# Patient Record
Sex: FEMALE | Race: WHITE | NOT HISPANIC OR LATINO
[De-identification: names, ages, dates, MRNs, and addresses within clinical notes are randomized per-mention and may not be internally consistent; named-entity substitution may affect disease eponyms.]

---

## 2016-12-03 RX ORDER — INSULIN GLARGINE 100 [IU]/ML
30 INJECTION, SOLUTION SUBCUTANEOUS
Qty: 0 | Refills: 0 | DISCHARGE
Start: 2016-12-03 | End: 2017-01-02

## 2017-03-24 ENCOUNTER — APPOINTMENT (OUTPATIENT)
Dept: VASCULAR SURGERY | Facility: CLINIC | Age: 67
End: 2017-03-24

## 2017-03-27 ENCOUNTER — APPOINTMENT (OUTPATIENT)
Dept: VASCULAR SURGERY | Facility: CLINIC | Age: 67
End: 2017-03-27

## 2017-04-03 ENCOUNTER — APPOINTMENT (OUTPATIENT)
Dept: VASCULAR SURGERY | Facility: CLINIC | Age: 67
End: 2017-04-03

## 2017-04-10 ENCOUNTER — APPOINTMENT (OUTPATIENT)
Dept: VASCULAR SURGERY | Facility: CLINIC | Age: 67
End: 2017-04-10

## 2017-05-04 ENCOUNTER — FORM ENCOUNTER (OUTPATIENT)
Age: 67
End: 2017-05-04

## 2017-05-05 ENCOUNTER — APPOINTMENT (OUTPATIENT)
Dept: VASCULAR SURGERY | Facility: CLINIC | Age: 67
End: 2017-05-05

## 2017-05-05 ENCOUNTER — OUTPATIENT (OUTPATIENT)
Dept: OUTPATIENT SERVICES | Facility: HOSPITAL | Age: 67
LOS: 1 days | End: 2017-05-05
Payer: COMMERCIAL

## 2017-05-05 PROCEDURE — 76937 US GUIDE VASCULAR ACCESS: CPT

## 2017-05-05 PROCEDURE — 36569 INSJ PICC 5 YR+ W/O IMAGING: CPT

## 2017-05-05 PROCEDURE — 77001 FLUOROGUIDE FOR VEIN DEVICE: CPT

## 2017-05-05 PROCEDURE — C1751: CPT

## 2017-05-05 PROCEDURE — 76937 US GUIDE VASCULAR ACCESS: CPT | Mod: 26

## 2017-05-05 PROCEDURE — 77001 FLUOROGUIDE FOR VEIN DEVICE: CPT | Mod: 26

## 2017-06-02 ENCOUNTER — APPOINTMENT (OUTPATIENT)
Dept: VASCULAR SURGERY | Facility: CLINIC | Age: 67
End: 2017-06-02

## 2017-06-02 VITALS — HEART RATE: 61 BPM | SYSTOLIC BLOOD PRESSURE: 192 MMHG | DIASTOLIC BLOOD PRESSURE: 89 MMHG | OXYGEN SATURATION: 97 %

## 2017-06-09 ENCOUNTER — EMERGENCY (EMERGENCY)
Facility: HOSPITAL | Age: 67
LOS: 1 days | Discharge: HOME | End: 2017-06-09
Attending: EMERGENCY MEDICINE
Payer: COMMERCIAL

## 2017-06-09 VITALS
WEIGHT: 132.94 LBS | DIASTOLIC BLOOD PRESSURE: 66 MMHG | SYSTOLIC BLOOD PRESSURE: 187 MMHG | OXYGEN SATURATION: 100 % | TEMPERATURE: 99 F | HEART RATE: 58 BPM | RESPIRATION RATE: 16 BRPM

## 2017-06-09 DIAGNOSIS — Z45.2 ENCOUNTER FOR ADJUSTMENT AND MANAGEMENT OF VASCULAR ACCESS DEVICE: ICD-10-CM

## 2017-06-09 PROCEDURE — 99282 EMERGENCY DEPT VISIT SF MDM: CPT

## 2017-06-09 NOTE — ED PROVIDER NOTE - NS ED MD SCRIBE ATTENDING SCRIBE SECTIONS
VITAL SIGNS( Pullset)/REVIEW OF SYSTEMS/PAST MEDICAL/SURGICAL/SOCIAL HISTORY/PHYSICAL EXAM/HISTORY OF PRESENT ILLNESS HISTORY OF PRESENT ILLNESS/PAST MEDICAL/SURGICAL/SOCIAL HISTORY/DISPOSITION/VITAL SIGNS( Pullset)/REVIEW OF SYSTEMS/PHYSICAL EXAM

## 2017-06-09 NOTE — ED PROVIDER NOTE - PMH
DM (diabetes mellitus)    HLD (hyperlipidemia)    HTN (hypertension)    IDDM (insulin dependent diabetes mellitus)

## 2017-06-09 NOTE — ED PROVIDER NOTE - OBJECTIVE STATEMENT
67 y/o F pt w/ PMHx of IDDM, HLD, and HTN was sent in by nurse for PICC line check L arm today. Pt states that she has been infusing Cipro twice per day via PICC line for a bone infection; pt noticed that her blue cap was missing from her PICC line tonight. Pt was told by a nurse to present to the ED. Pt denies fever, chills, or any other complaints. Pt is allergic to Penicillin (Rash).

## 2017-06-23 ENCOUNTER — FORM ENCOUNTER (OUTPATIENT)
Age: 67
End: 2017-06-23

## 2017-06-24 ENCOUNTER — OUTPATIENT (OUTPATIENT)
Dept: OUTPATIENT SERVICES | Facility: HOSPITAL | Age: 67
LOS: 1 days | End: 2017-06-24
Payer: COMMERCIAL

## 2017-06-24 PROCEDURE — 73718 MRI LOWER EXTREMITY W/O DYE: CPT | Mod: 26,LT

## 2017-06-24 PROCEDURE — 73718 MRI LOWER EXTREMITY W/O DYE: CPT

## 2017-06-26 ENCOUNTER — APPOINTMENT (OUTPATIENT)
Dept: VASCULAR SURGERY | Facility: CLINIC | Age: 67
End: 2017-06-26

## 2017-06-27 ENCOUNTER — APPOINTMENT (OUTPATIENT)
Dept: INFECTIOUS DISEASE | Facility: CLINIC | Age: 67
End: 2017-06-27

## 2017-06-27 VITALS
HEART RATE: 57 BPM | HEIGHT: 61 IN | TEMPERATURE: 97.9 F | RESPIRATION RATE: 14 BRPM | DIASTOLIC BLOOD PRESSURE: 84 MMHG | WEIGHT: 131 LBS | OXYGEN SATURATION: 98 % | SYSTOLIC BLOOD PRESSURE: 156 MMHG | BODY MASS INDEX: 24.73 KG/M2

## 2017-06-27 DIAGNOSIS — Z83.3 FAMILY HISTORY OF DIABETES MELLITUS: ICD-10-CM

## 2017-06-27 RX ORDER — FUROSEMIDE 40 MG/1
40 TABLET ORAL
Refills: 0 | Status: ACTIVE | COMMUNITY

## 2017-06-27 RX ORDER — TERAZOSIN 5 MG/1
5 CAPSULE ORAL
Refills: 0 | Status: ACTIVE | COMMUNITY

## 2017-06-27 RX ORDER — ATORVASTATIN CALCIUM 20 MG/1
20 TABLET, FILM COATED ORAL
Refills: 0 | Status: ACTIVE | COMMUNITY

## 2017-06-28 LAB
ALBUMIN SERPL ELPH-MCNC: 4.4 G/DL
ALP BLD-CCNC: 64 U/L
ALT SERPL-CCNC: 29 U/L
ANION GAP SERPL CALC-SCNC: 14 MMOL/L
AST SERPL-CCNC: 38 U/L
BASOPHILS # BLD AUTO: 0.07 K/UL
BASOPHILS NFR BLD AUTO: 0.8 %
BILIRUB SERPL-MCNC: 0.5 MG/DL
BUN SERPL-MCNC: 41 MG/DL
CALCIUM SERPL-MCNC: 10.5 MG/DL
CHLORIDE SERPL-SCNC: 99 MMOL/L
CO2 SERPL-SCNC: 26 MMOL/L
CREAT SERPL-MCNC: 1.82 MG/DL
CRP SERPL-MCNC: <0.2 MG/DL
EOSINOPHIL # BLD AUTO: 0.79 K/UL
EOSINOPHIL NFR BLD AUTO: 9.2 %
ERYTHROCYTE [SEDIMENTATION RATE] IN BLOOD BY WESTERGREN METHOD: 35 MM/HR
GLUCOSE SERPL-MCNC: 220 MG/DL
HCT VFR BLD CALC: 34.3 %
HGB BLD-MCNC: 11.3 G/DL
IMM GRANULOCYTES NFR BLD AUTO: 0.1 %
LYMPHOCYTES # BLD AUTO: 2.56 K/UL
LYMPHOCYTES NFR BLD AUTO: 29.8 %
MAN DIFF?: NORMAL
MCHC RBC-ENTMCNC: 31 PG
MCHC RBC-ENTMCNC: 32.9 GM/DL
MCV RBC AUTO: 94 FL
MONOCYTES # BLD AUTO: 0.59 K/UL
MONOCYTES NFR BLD AUTO: 6.9 %
NEUTROPHILS # BLD AUTO: 4.56 K/UL
NEUTROPHILS NFR BLD AUTO: 53.2 %
PLATELET # BLD AUTO: 252 K/UL
POTASSIUM SERPL-SCNC: 4.6 MMOL/L
PROT SERPL-MCNC: 8.8 G/DL
RBC # BLD: 3.65 M/UL
RBC # FLD: 13.8 %
SODIUM SERPL-SCNC: 139 MMOL/L
WBC # FLD AUTO: 8.58 K/UL

## 2017-07-14 ENCOUNTER — APPOINTMENT (OUTPATIENT)
Dept: INFECTIOUS DISEASE | Facility: CLINIC | Age: 67
End: 2017-07-14

## 2017-07-14 VITALS
BODY MASS INDEX: 24.55 KG/M2 | HEIGHT: 61 IN | DIASTOLIC BLOOD PRESSURE: 80 MMHG | WEIGHT: 130 LBS | HEART RATE: 59 BPM | OXYGEN SATURATION: 97 % | SYSTOLIC BLOOD PRESSURE: 142 MMHG | RESPIRATION RATE: 12 BRPM | TEMPERATURE: 98 F

## 2017-07-14 RX ORDER — BLOOD SUGAR DIAGNOSTIC
STRIP MISCELLANEOUS
Qty: 600 | Refills: 0 | Status: ACTIVE | COMMUNITY
Start: 2017-01-04

## 2017-07-14 RX ORDER — CLINDAMYCIN HYDROCHLORIDE 300 MG/1
300 CAPSULE ORAL
Qty: 42 | Refills: 0 | Status: COMPLETED | COMMUNITY
Start: 2017-04-11

## 2017-07-14 RX ORDER — METOPROLOL SUCCINATE 100 MG/1
100 TABLET, EXTENDED RELEASE ORAL
Qty: 90 | Refills: 0 | Status: ACTIVE | COMMUNITY
Start: 2016-12-30

## 2017-07-17 ENCOUNTER — APPOINTMENT (OUTPATIENT)
Dept: VASCULAR SURGERY | Facility: CLINIC | Age: 67
End: 2017-07-17

## 2017-07-17 LAB
ALBUMIN SERPL ELPH-MCNC: 4.6 G/DL
ALP BLD-CCNC: 79 U/L
ALT SERPL-CCNC: 31 U/L
ANION GAP SERPL CALC-SCNC: 19 MMOL/L
AST SERPL-CCNC: 31 U/L
BASOPHILS # BLD AUTO: 0.06 K/UL
BASOPHILS NFR BLD AUTO: 0.8 %
BILIRUB SERPL-MCNC: 0.4 MG/DL
BUN SERPL-MCNC: 50 MG/DL
CALCIUM SERPL-MCNC: 10.9 MG/DL
CHLORIDE SERPL-SCNC: 97 MMOL/L
CO2 SERPL-SCNC: 25 MMOL/L
CREAT SERPL-MCNC: 1.95 MG/DL
CRP SERPL-MCNC: <0.2 MG/DL
EOSINOPHIL # BLD AUTO: 0.5 K/UL
EOSINOPHIL NFR BLD AUTO: 6.3 %
ERYTHROCYTE [SEDIMENTATION RATE] IN BLOOD BY WESTERGREN METHOD: 41 MM/HR
GLUCOSE SERPL-MCNC: 148 MG/DL
HCT VFR BLD CALC: 35 %
HGB BLD-MCNC: 11.6 G/DL
IMM GRANULOCYTES NFR BLD AUTO: 0.1 %
LYMPHOCYTES # BLD AUTO: 2.89 K/UL
LYMPHOCYTES NFR BLD AUTO: 36.2 %
MAN DIFF?: NORMAL
MCHC RBC-ENTMCNC: 31.2 PG
MCHC RBC-ENTMCNC: 33.1 GM/DL
MCV RBC AUTO: 94.1 FL
MONOCYTES # BLD AUTO: 0.5 K/UL
MONOCYTES NFR BLD AUTO: 6.3 %
NEUTROPHILS # BLD AUTO: 4.02 K/UL
NEUTROPHILS NFR BLD AUTO: 50.3 %
PLATELET # BLD AUTO: 250 K/UL
POTASSIUM SERPL-SCNC: 5.1 MMOL/L
PROT SERPL-MCNC: 8.9 G/DL
RBC # BLD: 3.72 M/UL
RBC # FLD: 13.6 %
SODIUM SERPL-SCNC: 141 MMOL/L
WBC # FLD AUTO: 7.98 K/UL

## 2017-09-16 ENCOUNTER — OUTPATIENT (OUTPATIENT)
Dept: OUTPATIENT SERVICES | Facility: HOSPITAL | Age: 67
LOS: 1 days | End: 2017-09-16
Payer: COMMERCIAL

## 2017-09-16 PROCEDURE — 73718 MRI LOWER EXTREMITY W/O DYE: CPT | Mod: 26,LT

## 2017-09-16 PROCEDURE — 73718 MRI LOWER EXTREMITY W/O DYE: CPT

## 2017-09-18 ENCOUNTER — APPOINTMENT (OUTPATIENT)
Dept: VASCULAR SURGERY | Facility: CLINIC | Age: 67
End: 2017-09-18

## 2017-09-26 ENCOUNTER — APPOINTMENT (OUTPATIENT)
Dept: INFECTIOUS DISEASE | Facility: CLINIC | Age: 67
End: 2017-09-26
Payer: COMMERCIAL

## 2017-09-26 VITALS
SYSTOLIC BLOOD PRESSURE: 148 MMHG | HEIGHT: 61 IN | TEMPERATURE: 98.6 F | OXYGEN SATURATION: 97 % | DIASTOLIC BLOOD PRESSURE: 70 MMHG | HEART RATE: 60 BPM | RESPIRATION RATE: 14 BRPM | WEIGHT: 138 LBS | BODY MASS INDEX: 26.06 KG/M2

## 2017-09-26 DIAGNOSIS — L97.929 NON-PRESSURE CHRONIC ULCER OF UNSPECIFIED PART OF LEFT LOWER LEG WITH UNSPECIFIED SEVERITY: ICD-10-CM

## 2017-09-26 DIAGNOSIS — M86.9 OSTEOMYELITIS, UNSPECIFIED: ICD-10-CM

## 2017-09-26 PROCEDURE — 99214 OFFICE O/P EST MOD 30 MIN: CPT

## 2017-09-26 RX ORDER — INSULIN GLARGINE 100 [IU]/ML
INJECTION, SOLUTION SUBCUTANEOUS
Refills: 0 | Status: ACTIVE | COMMUNITY

## 2018-01-31 ENCOUNTER — INPATIENT (INPATIENT)
Facility: HOSPITAL | Age: 68
LOS: 4 days | Discharge: HOME | DRG: 638 | End: 2018-02-05
Attending: INTERNAL MEDICINE | Admitting: INTERNAL MEDICINE
Payer: COMMERCIAL

## 2018-01-31 VITALS
SYSTOLIC BLOOD PRESSURE: 186 MMHG | HEART RATE: 69 BPM | DIASTOLIC BLOOD PRESSURE: 74 MMHG | WEIGHT: 146.61 LBS | RESPIRATION RATE: 18 BRPM | OXYGEN SATURATION: 97 % | TEMPERATURE: 99 F

## 2018-01-31 DIAGNOSIS — E11.621 TYPE 2 DIABETES MELLITUS WITH FOOT ULCER: ICD-10-CM

## 2018-01-31 DIAGNOSIS — E87.3 ALKALOSIS: ICD-10-CM

## 2018-01-31 DIAGNOSIS — E11.9 TYPE 2 DIABETES MELLITUS WITHOUT COMPLICATIONS: ICD-10-CM

## 2018-01-31 DIAGNOSIS — R63.8 OTHER SYMPTOMS AND SIGNS CONCERNING FOOD AND FLUID INTAKE: ICD-10-CM

## 2018-01-31 DIAGNOSIS — N18.9 CHRONIC KIDNEY DISEASE, UNSPECIFIED: ICD-10-CM

## 2018-01-31 DIAGNOSIS — Z29.9 ENCOUNTER FOR PROPHYLACTIC MEASURES, UNSPECIFIED: ICD-10-CM

## 2018-01-31 DIAGNOSIS — E83.52 HYPERCALCEMIA: ICD-10-CM

## 2018-01-31 LAB
ALBUMIN SERPL ELPH-MCNC: 4.5 G/DL — SIGNIFICANT CHANGE UP (ref 3.3–5)
ALP SERPL-CCNC: 94 U/L — SIGNIFICANT CHANGE UP (ref 40–120)
ALT FLD-CCNC: 19 U/L — SIGNIFICANT CHANGE UP (ref 10–45)
ANION GAP SERPL CALC-SCNC: 11 MMOL/L — SIGNIFICANT CHANGE UP (ref 5–17)
ANISOCYTOSIS BLD QL: SLIGHT — SIGNIFICANT CHANGE UP
APTT BLD: 32 SEC — SIGNIFICANT CHANGE UP (ref 27.5–37.4)
AST SERPL-CCNC: 22 U/L — SIGNIFICANT CHANGE UP (ref 10–40)
BASOPHILS NFR BLD AUTO: 0.5 % — SIGNIFICANT CHANGE UP (ref 0–2)
BASOPHILS NFR BLD AUTO: 1 % — SIGNIFICANT CHANGE UP (ref 0–2)
BILIRUB SERPL-MCNC: 0.4 MG/DL — SIGNIFICANT CHANGE UP (ref 0.2–1.2)
BUN SERPL-MCNC: 35 MG/DL — HIGH (ref 7–23)
CALCIUM SERPL-MCNC: 11.4 MG/DL — HIGH (ref 8.4–10.5)
CHLORIDE SERPL-SCNC: 99 MMOL/L — SIGNIFICANT CHANGE UP (ref 96–108)
CO2 SERPL-SCNC: 32 MMOL/L — HIGH (ref 22–31)
CREAT SERPL-MCNC: 1.64 MG/DL — HIGH (ref 0.5–1.3)
CRP SERPL-MCNC: 3.35 MG/DL — HIGH (ref 0–0.4)
EOSINOPHIL NFR BLD AUTO: 2.9 % — SIGNIFICANT CHANGE UP (ref 0–6)
EOSINOPHIL NFR BLD AUTO: 3 % — SIGNIFICANT CHANGE UP (ref 0–6)
ERYTHROCYTE [SEDIMENTATION RATE] IN BLOOD: 66 MM/HR — HIGH
GLUCOSE BLDC GLUCOMTR-MCNC: 163 MG/DL — HIGH (ref 70–99)
GLUCOSE SERPL-MCNC: 220 MG/DL — HIGH (ref 70–99)
HCT VFR BLD CALC: 33.6 % — LOW (ref 34.5–45)
HGB BLD-MCNC: 11.4 G/DL — LOW (ref 11.5–15.5)
INR BLD: 1.04 — SIGNIFICANT CHANGE UP (ref 0.88–1.16)
LYMPHOCYTES # BLD AUTO: 24.4 % — SIGNIFICANT CHANGE UP (ref 13–44)
LYMPHOCYTES # BLD AUTO: 33 % — SIGNIFICANT CHANGE UP (ref 13–44)
MANUAL DIF COMMENT BLD-IMP: SIGNIFICANT CHANGE UP
MCHC RBC-ENTMCNC: 31.1 PG — SIGNIFICANT CHANGE UP (ref 27–34)
MCHC RBC-ENTMCNC: 33.9 G/DL — SIGNIFICANT CHANGE UP (ref 32–36)
MCV RBC AUTO: 91.8 FL — SIGNIFICANT CHANGE UP (ref 80–100)
MONOCYTES NFR BLD AUTO: 4 % — SIGNIFICANT CHANGE UP (ref 2–14)
MONOCYTES NFR BLD AUTO: 6.3 % — SIGNIFICANT CHANGE UP (ref 2–14)
NEUTROPHILS NFR BLD AUTO: 59 % — SIGNIFICANT CHANGE UP (ref 43–77)
NEUTROPHILS NFR BLD AUTO: 65.9 % — SIGNIFICANT CHANGE UP (ref 43–77)
PLAT MORPH BLD: (no result)
PLATELET # BLD AUTO: 278 K/UL — SIGNIFICANT CHANGE UP (ref 150–400)
POTASSIUM SERPL-MCNC: 4.8 MMOL/L — SIGNIFICANT CHANGE UP (ref 3.5–5.3)
POTASSIUM SERPL-SCNC: 4.8 MMOL/L — SIGNIFICANT CHANGE UP (ref 3.5–5.3)
PROT SERPL-MCNC: 9.1 G/DL — HIGH (ref 6–8.3)
PROTHROM AB SERPL-ACNC: 11.6 SEC — SIGNIFICANT CHANGE UP (ref 9.8–12.7)
RBC # BLD: 3.66 M/UL — LOW (ref 3.8–5.2)
RBC # FLD: 12.3 % — SIGNIFICANT CHANGE UP (ref 10.3–16.9)
RBC BLD AUTO: (no result)
SODIUM SERPL-SCNC: 142 MMOL/L — SIGNIFICANT CHANGE UP (ref 135–145)
WBC # BLD: 10.9 K/UL — HIGH (ref 3.8–10.5)
WBC # FLD AUTO: 10.9 K/UL — HIGH (ref 3.8–10.5)

## 2018-01-31 PROCEDURE — 73630 X-RAY EXAM OF FOOT: CPT | Mod: 26,RT

## 2018-01-31 PROCEDURE — 99285 EMERGENCY DEPT VISIT HI MDM: CPT

## 2018-01-31 RX ORDER — ATORVASTATIN CALCIUM 80 MG/1
20 TABLET, FILM COATED ORAL AT BEDTIME
Qty: 0 | Refills: 0 | Status: DISCONTINUED | OUTPATIENT
Start: 2018-01-31 | End: 2018-02-05

## 2018-01-31 RX ORDER — CEFEPIME 1 G/1
2000 INJECTION, POWDER, FOR SOLUTION INTRAMUSCULAR; INTRAVENOUS ONCE
Qty: 0 | Refills: 0 | Status: DISCONTINUED | OUTPATIENT
Start: 2018-01-31 | End: 2018-01-31

## 2018-01-31 RX ORDER — GLUCAGON INJECTION, SOLUTION 0.5 MG/.1ML
1 INJECTION, SOLUTION SUBCUTANEOUS ONCE
Qty: 0 | Refills: 0 | Status: DISCONTINUED | OUTPATIENT
Start: 2018-01-31 | End: 2018-02-05

## 2018-01-31 RX ORDER — CEFEPIME 1 G/1
2000 INJECTION, POWDER, FOR SOLUTION INTRAMUSCULAR; INTRAVENOUS ONCE
Qty: 0 | Refills: 0 | Status: COMPLETED | OUTPATIENT
Start: 2018-01-31 | End: 2018-01-31

## 2018-01-31 RX ORDER — INSULIN GLARGINE 100 [IU]/ML
25 INJECTION, SOLUTION SUBCUTANEOUS AT BEDTIME
Qty: 0 | Refills: 0 | Status: DISCONTINUED | OUTPATIENT
Start: 2018-01-31 | End: 2018-02-04

## 2018-01-31 RX ORDER — FUROSEMIDE 40 MG
40 TABLET ORAL DAILY
Qty: 0 | Refills: 0 | Status: DISCONTINUED | OUTPATIENT
Start: 2018-01-31 | End: 2018-01-31

## 2018-01-31 RX ORDER — INSULIN LISPRO 100/ML
VIAL (ML) SUBCUTANEOUS
Qty: 0 | Refills: 0 | Status: DISCONTINUED | OUTPATIENT
Start: 2018-01-31 | End: 2018-02-05

## 2018-01-31 RX ORDER — VANCOMYCIN HCL 1 G
1000 VIAL (EA) INTRAVENOUS ONCE
Qty: 0 | Refills: 0 | Status: COMPLETED | OUTPATIENT
Start: 2018-01-31 | End: 2018-01-31

## 2018-01-31 RX ORDER — SODIUM CHLORIDE 9 MG/ML
1000 INJECTION INTRAMUSCULAR; INTRAVENOUS; SUBCUTANEOUS ONCE
Qty: 0 | Refills: 0 | Status: COMPLETED | OUTPATIENT
Start: 2018-01-31 | End: 2018-01-31

## 2018-01-31 RX ORDER — SODIUM CHLORIDE 9 MG/ML
1000 INJECTION, SOLUTION INTRAVENOUS
Qty: 0 | Refills: 0 | Status: DISCONTINUED | OUTPATIENT
Start: 2018-01-31 | End: 2018-02-05

## 2018-01-31 RX ORDER — ASPIRIN/CALCIUM CARB/MAGNESIUM 324 MG
81 TABLET ORAL DAILY
Qty: 0 | Refills: 0 | Status: DISCONTINUED | OUTPATIENT
Start: 2018-01-31 | End: 2018-02-05

## 2018-01-31 RX ORDER — TAMSULOSIN HYDROCHLORIDE 0.4 MG/1
0.8 CAPSULE ORAL AT BEDTIME
Qty: 0 | Refills: 0 | Status: DISCONTINUED | OUTPATIENT
Start: 2018-01-31 | End: 2018-02-05

## 2018-01-31 RX ORDER — DEXTROSE 50 % IN WATER 50 %
25 SYRINGE (ML) INTRAVENOUS ONCE
Qty: 0 | Refills: 0 | Status: DISCONTINUED | OUTPATIENT
Start: 2018-01-31 | End: 2018-02-05

## 2018-01-31 RX ORDER — METOPROLOL TARTRATE 50 MG
100 TABLET ORAL DAILY
Qty: 0 | Refills: 0 | Status: DISCONTINUED | OUTPATIENT
Start: 2018-01-31 | End: 2018-02-02

## 2018-01-31 RX ORDER — DEXTROSE 50 % IN WATER 50 %
12.5 SYRINGE (ML) INTRAVENOUS ONCE
Qty: 0 | Refills: 0 | Status: DISCONTINUED | OUTPATIENT
Start: 2018-01-31 | End: 2018-02-05

## 2018-01-31 RX ORDER — INSULIN GLARGINE 100 [IU]/ML
30 INJECTION, SOLUTION SUBCUTANEOUS AT BEDTIME
Qty: 0 | Refills: 0 | Status: DISCONTINUED | OUTPATIENT
Start: 2018-01-31 | End: 2018-01-31

## 2018-01-31 RX ORDER — VANCOMYCIN HCL 1 G
1000 VIAL (EA) INTRAVENOUS EVERY 24 HOURS
Qty: 0 | Refills: 0 | Status: DISCONTINUED | OUTPATIENT
Start: 2018-02-01 | End: 2018-02-01

## 2018-01-31 RX ORDER — DEXTROSE 50 % IN WATER 50 %
1 SYRINGE (ML) INTRAVENOUS ONCE
Qty: 0 | Refills: 0 | Status: DISCONTINUED | OUTPATIENT
Start: 2018-01-31 | End: 2018-02-05

## 2018-01-31 RX ADMIN — TAMSULOSIN HYDROCHLORIDE 0.8 MILLIGRAM(S): 0.4 CAPSULE ORAL at 23:58

## 2018-01-31 RX ADMIN — SODIUM CHLORIDE 1000 MILLILITER(S): 9 INJECTION INTRAMUSCULAR; INTRAVENOUS; SUBCUTANEOUS at 19:09

## 2018-01-31 RX ADMIN — INSULIN GLARGINE 25 UNIT(S): 100 INJECTION, SOLUTION SUBCUTANEOUS at 23:08

## 2018-01-31 RX ADMIN — Medication 100 MILLIGRAM(S): at 23:58

## 2018-01-31 RX ADMIN — Medication 250 MILLIGRAM(S): at 14:32

## 2018-01-31 RX ADMIN — ATORVASTATIN CALCIUM 20 MILLIGRAM(S): 80 TABLET, FILM COATED ORAL at 23:08

## 2018-01-31 RX ADMIN — Medication 2: at 23:09

## 2018-01-31 RX ADMIN — CEFEPIME 2000 MILLIGRAM(S): 1 INJECTION, POWDER, FOR SOLUTION INTRAMUSCULAR; INTRAVENOUS at 18:22

## 2018-01-31 NOTE — H&P ADULT - PROBLEM SELECTOR PLAN 5
Diabetic diet. Low risk (Improve 1), no pharmacologic DVT, encourage ambulation. mild however persistantly high (10.9 outpatient in July, also has elevated protein outpatient, no work up per patient), with elevated protein gap.  Would do work up for MM.  Would follow up with vit D, PTH, PTHrP.

## 2018-01-31 NOTE — H&P ADULT - PROBLEM SELECTOR PLAN 3
Resume lantus 30 at bed time. Follow up A1C.  Resume lantus 25 at bed time (Home dose 30, however more restricted diet inpatient). Follow up A1C. Marzena Elevated bicarb, likely 2/2 to contraction alkylosis 2/2 to lasix.  Holding for now, would f/u with Dr. Mayorga in the AM for collaterol on lasix, patient has no hx of CHF or extremity swelling, nephrotic?

## 2018-01-31 NOTE — H&P ADULT - PMH
CKD (chronic kidney disease)    DM (diabetes mellitus)    HLD (hyperlipidemia)    HTN (hypertension)    IDDM (insulin dependent diabetes mellitus)

## 2018-01-31 NOTE — H&P ADULT - PROBLEM SELECTOR PLAN 4
Low risk (Improve 1), no pharmacologic DVT, encourage ambulation. Mild, could be dehydration, given 1L bolus NS.  Follow repeat level in the AM, if persistently high, would do work up (Vit D, PTH, PTHrP). mild however persistantly high (10.9 outpatient in July, also has elevated protein outpatient, no work up per patient), with elevated protein gap.  Would do work up for MM.  Would follow up with vit D, PTH, PTHrP. Resume lantus 25 at bed time (Home dose 30, however more restricted diet inpatient). Follow up A1C. Marzena

## 2018-01-31 NOTE — H&P ADULT - NSHPREVIEWOFSYSTEMS_GEN_ALL_CORE
( -  )fevers/chills  ( - ) dyspnea  (  - ) cough  (  - ) chest pain  (  - ) palpatations  ( - ) dizziness/lightheadedness  (  - ) nausea/vomiting  (  - ) abd pain  (  - ) diarrhea  (  - ) melena  (  - ) hematochezia  (  - ) dysuria  ( - ) hematuria  (  - ) leg swelling  ( -) calf tenderness  (  - ) motor weakness  (  - ) extremity numbness  ( - ) back pain  ( + ) tolerating POs  ( + ) BM  ROS: 12 point review of systems otherwise negative

## 2018-01-31 NOTE — ED CLERICAL - NS ED CLERK NOTE PRE-ARRIVAL INFORMATION; ADDITIONAL PRE-ARRIVAL INFORMATION
66 Y/O PIETRO JOSE IS BEING SENT BY DR CAPPS FOR DIABETIC RIGHT FOOT INFECTION TO BE ADMITED UNDER DR SHAYLA RAJPUT PODIATRY RESIDENT WHEN PT ARRIVES

## 2018-01-31 NOTE — H&P ADULT - PROBLEM SELECTOR PLAN 2
Unclear baseline, however patient is on dialysis in the past. Would follow up with Dr. Mayorga office in the AM for collaterol.  Patient was started on lasix, metoprolol and terazosin by dr. Mayorga, unclear why. Would continue for now. Outpatient lab 1.3-1.9, patient was on dialysis in the past. Would follow up with Dr. Mayorga office in the AM for collaterol.  Patient was started on lasix, metoprolol and terazosin by dr. Mayorga, unclear why. Would continue for now. Outpatient lab 1.3-1.9, patient was on dialysis in the past. Would follow up with Dr. Mayorga office in the AM for collaterol.  Patient was started on lasix, metoprolol and terazosin by dr. Mayorga, unclear why. Would continue for now.  Holding Lasix due to contraction metabolic acidosis.

## 2018-01-31 NOTE — H&P ADULT - NSHPLABSRESULTS_GEN_ALL_CORE
11.4   10.9  )-----------( 278      ( 31 Jan 2018 14:45 )             33.6   01-31    142  |  99  |  35<H>  ----------------------------<  220<H>  4.8   |  32<H>  |  1.64<H>    Ca    11.4<H>      31 Jan 2018 14:45    TPro  9.1<H>  /  Alb  4.5  /  TBili  0.4  /  DBili  x   /  AST  22  /  ALT  19  /  AlkPhos  94  01-31

## 2018-01-31 NOTE — ED PROVIDER NOTE - OBJECTIVE STATEMENT
66 yo F with pmh of DM, HLD, HTN, sent in by Dr. Huang for diabetic foot ulcer. Pt states she noticed a discoloration on the bottom of her R great toe 2 days ago. Denies trauma. Pt states she went to Dr. Huang today who sent her in to be admitted for IV abx. Denies fever, chills, pain, swelling, discharge.

## 2018-01-31 NOTE — H&P ADULT - NSHPPHYSICALEXAM_GEN_ALL_CORE
PHYSICAL EXAM:    Constitutional: NAD  Eyes: PERRLA, EOMI  ENMT: MMM, neck supple, no lymphadenopathy  Respiratory: CTAB, no r/r  Cardiovascular: RRR, audible S1S2, no murmur/rub/gallop  Gastrointestinal: Soft, NDNT  Extremities: no edema, no ulceration  Vascular: DPs and Radial pulse palpaple  Neurological: AnOx3, CN II-XII intact  Skin: 8erb4rs dark ulceration on bottom right toe, no visible exudate, mild surrounding erythema, non-tender to palpation.  Lymph Nodes: no lympadenopathy  Musculoskeletal: full ROM on all extremities

## 2018-01-31 NOTE — ED PROVIDER NOTE - PHYSICAL EXAMINATION
CONSTITUTIONAL: Well-appearing; well-nourished; in no apparent distress.   HEAD: Normocephalic; atraumatic.   EYES: PERRL; EOM intact; conjunctiva and sclera clear  ENT: normal nose; no rhinorrhea; normal pharynx with no erythema or lesions.   NECK: Supple; non-tender;   CARDIOVASCULAR: Normal S1, S2; no murmurs, rubs, or gallops. Regular rate and rhythm.   RESPIRATORY: Breathing easily; breath sounds clear and equal bilaterally; no wheezes, rhonchi, or rales.  MSK: FROM at all extremities, normal tone   EXT: No cyanosis or edema; N/V intact  SKIN: R great toe- 3x3cm shallow ulcer, dry to bottom of toe, DP pulse 2+, good cap refill

## 2018-01-31 NOTE — ED PROVIDER NOTE - MEDICAL DECISION MAKING DETAILS
68 yo F with pmh of DM, HLD, HTN, sent in by Dr. Huang for diabetic foot ulcer. R great toe- 3x3cm shallow ulcer, dry to bottom of toe, DP pulse 2+, good cap refill

## 2018-01-31 NOTE — H&P ADULT - HISTORY OF PRESENT ILLNESS
Patient is a 68 yo female pmh of DM2 on insulin, CKD, who presented with ulcer of right toe. Patient stated she noticed the ulcer 2 days ago, unclear the trigger, thought it was just ink marker, denied pain, no exudate, no open wound, never had ulcer in the past, denied fever/chill, no n/v. Been following up with Dr. Lee, who sent the patient here for IV antibiotics.  In the ED, patient given vancomycin, ESR and CRP elevated, Xray of foot showed no osteo. Evaluated by podiatry. Patient is a 66 yo female pmh of DM2 on insulin, CKD, who presented with ulcer of right toe. Patient stated she noticed the ulcer 2 days ago, unclear the trigger, thought it was just ink marker, denied pain, no exudate, no open wound, never had ulcer in the past, denied fever/chill, no n/v. Been following up with Dr. Lee, who sent the patient here for IV antibiotics.  Patient was treated   In the ED, patient given vancomycin, ESR and CRP elevated, Xray of foot showed no osteo. Evaluated by podiatry. Patient is a 66 yo female pmh of DM2 on insulin, CKD, who presented with ulcer of right toe. Patient stated she noticed the ulcer 2 days ago, unclear the trigger, thought it was just ink marker, denied pain, no exudate, no open wound, never had ulcer in the past, denied fever/chill, no n/v. Been following up with Dr. Lee, who sent the patient here for IV antibiotics.  Patient was treated for osteo of left foot with cipro and clinda IV outpatient July 2017, followed by Dr. Alvarado previously.  In the ED, patient given vancomycin, ESR and CRP elevated, Xray of foot showed no osteo. Evaluated by podiatry.

## 2018-01-31 NOTE — ED ADULT NURSE NOTE - CHPI ED SYMPTOMS NEG
no bleeding/no purulent drainage/no red streaks/no vomiting/no pain/no chills/no bleeding at site/no drainage/no fever

## 2018-01-31 NOTE — ED ADULT NURSE REASSESSMENT NOTE - NS ED NURSE REASSESS COMMENT FT1
Pt received from [facility], Pt is A&Ox3 with no s.s of acute distress at this time. Pt has right big toe wrapped in gauze at this time.  Pt awaiting bed and will continue to monitor.

## 2018-01-31 NOTE — CONSULT NOTE ADULT - ASSESSMENT
A/P:  67y Female presents with an infected Cardenas grade 1 ulceration of the right plantar hallux A/P:  67y Female presents with an infected Cardenas grade 1 ulceration of the right plantar hallux    -Admit to medicine under Dr. Seth  -Recommend starting broad spectrum IV antibiotics. Patient reports penicillin allergy that resulted in hives.  -Podiatry will follow A/P:  67y Female presents with an infected Cardenas grade 1 ulceration of the right plantar hallux    -Admit to medicine, podiatry will follow for treatment of wound  -Recommend starting broad spectrum IV antibiotics. Patient reports penicillin allergy that resulted in hives. A/P:  67y Female presents with an infected Cardenas grade 1 ulceration of the right plantar hallux    -Admit to medicine, podiatry will follow for treatment of wound  -Patient has elevated ESR, CRP, and WBC  -Recommend starting broad spectrum IV antibiotics. Patient reports penicillin allergy that resulted in hives. A/P:  67y Female presents with an infected Cardenas grade 1 ulceration of the right plantar hallux with cellulitis    -Admit to medicine, podiatry will follow for treatment of wound  -She is a patient of Dr. Alvarado for ID, would consult her for ID  -Patient has elevated ESR, CRP, and WBC  -Recommend starting broad spectrum IV antibiotics. Patient reports penicillin allergy that resulted in hives. Will need anaerobe and gram negative coverage in addition to Vancomycin.

## 2018-01-31 NOTE — CONSULT NOTE ADULT - SUBJECTIVE AND OBJECTIVE BOX
Patient is a 67y old  Female who presents with a chief complaint of right foot ulcer     HPI:  67 year old female with PMH of DM, HLD, HTN, reported kidney problems, presents to the ED with an ulcer on the plantar aspect of her right hallux which she says she just noticed over the weekend, but doesn't know how long it has been there in total. She was sent in by her podiatrist Dr. Bennett for admission with IV antibiotics with concern for infection. She states that she has a history of ulceration on her left foot which she was treated with IV Cipro over the summer. She states that the new ulceration is not causing her any pain and she has not had any fevers, chills, nausea, or vomiting. She notes that she had some drainage in her sock yesterday and today. She denies fevers, chills, nausea, or vomiting.      REVIEW OF SYSTEMS:    General:  no weakness; no fevers, no chills  Skin/Breast: ulcer with surrounding erythema right hallux  Respiratory and Thorax: no SOB, no cough  Cardiovascular:	No chest pain  Gastrointestinal:	 no nausea, vomiting , diarrhea  Genitourinary:	no dysuria, no difficulty urinating, no hematuria  Musculoskeletal:	no weakness, no joint swelling/pain  Neurological: no focal weakness/numbness  Endocrine: no polyuria, no polydipsia    PAST MEDICAL & SURGICAL HISTORY:  IDDM (insulin dependent diabetes mellitus)  DM (diabetes mellitus)  HLD (hyperlipidemia)  HTN (hypertension)  No significant past surgical history      MEDICATIONS  (STANDING):  Furosemide 40mg  Metoprolol 100mg  Atorvastatin 20mg  Terazosin 5mg  Lantus solastar 30  Baby aspirin  Multivitamins      MEDICATIONS  (PRN):      Allergies    penicillin (Rash)    Intolerances        FAMILY HISTORY:    Father and brother-DM                        11.4   10.9  )-----------( 278      ( 31 Jan 2018 14:45 )             33.6                                                              Medical Imaging:       PE:   GEN: Patient is a 67y well developed, well nourished Female, alert, awake and oriented to person, place and time in no acute distress.     Extremities   Vascular:  DP/PT pulses 2/4 b/l, CFT <3sec. +Hair growth on feet  Derm: 3.5cm x 2.5cm Cardenas grade 1 ulceration located on plantar aspect of right hallux extending proximally toward the 1st MPJ with central black eschar and surrounding mild purulent-light brownish drainage. Periwound erythema surrounding the hallux and extending proximally to the 1st MPJ and dorsally on the hallux  slightly warm to touch. No streaking, no malodor, no PTB, No undermining.   Neuro: Protective sensation slightly diminished, gross sensation intact  MSK: MMT 5/5, normal ROM. Patient is a 67y old  Female who presents with a chief complaint of right foot ulcer     HPI:  67 year old female with PMH of DM, HLD, HTN, reported kidney problems, presents to the ED with an ulcer on the plantar aspect of her right hallux which she says she just noticed over the weekend, but doesn't know how long it has been there in total. She was sent in by her podiatrist Dr. Bennett for admission with IV antibiotics with concern for infection. She states that she has a history of ulceration on her left foot which she was treated with IV Cipro over the summer. She states that the new ulceration is not causing her any pain and she has not had any fevers, chills, nausea, or vomiting. She notes that she had some drainage in her sock yesterday and today. She denies fevers, chills, nausea, or vomiting.      REVIEW OF SYSTEMS:    General:  no weakness; no fevers, no chills  Skin/Breast: ulcer with surrounding erythema right hallux  Respiratory and Thorax: no SOB, no cough  Cardiovascular:	No chest pain  Gastrointestinal:	 no nausea, vomiting , diarrhea  Genitourinary:	no dysuria, no difficulty urinating, no hematuria  Musculoskeletal:	no weakness, no joint swelling/pain  Neurological: no focal weakness/numbness  Endocrine: no polyuria, no polydipsia    PAST MEDICAL & SURGICAL HISTORY:  IDDM (insulin dependent diabetes mellitus)  DM (diabetes mellitus)  HLD (hyperlipidemia)  HTN (hypertension)  No significant past surgical history      MEDICATIONS  (STANDING):  Furosemide 40mg  Metoprolol 100mg  Atorvastatin 20mg  Terazosin 5mg  Lantus solastar 30  Baby aspirin  Multivitamins      MEDICATIONS  (PRN):      Allergies    penicillin (Rash)    Intolerances        FAMILY HISTORY:    Father and brother-DM                        11.4   10.9  )-----------( 278      ( 31 Jan 2018 14:45 )             33.6                                                              Medical Imaging: 3 views of the right foot. Defect of soft tissue correlating to the ulcer site, no soft tissue or subcutaneous gas. Hallux and first metatarsal bone with no apparent cortical erosions or fractures present.      PE:   GEN: Patient is a 67y well developed, well nourished Female, alert, awake and oriented to person, place and time in no acute distress.     Extremities   Vascular:  DP/PT pulses 2/4 b/l, CFT <3sec. +Hair growth on feet  Derm: 3.5cm x 2.5cm Cardenas grade 1 ulceration located on plantar aspect of right hallux extending proximally toward the 1st MPJ with central black eschar and surrounding mild purulent-light brownish drainage. Periwound erythema surrounding the hallux and extending proximally to the 1st MPJ and dorsally on the hallux  slightly warm to touch. No streaking, no malodor, no PTB, No undermining.   Neuro: Protective sensation slightly diminished, gross sensation intact  MSK: MMT 5/5, normal ROM. Patient is a 67y old  Female who presents with a chief complaint of right foot ulcer     HPI:  67 year old female with PMH of DM, HLD, HTN, reported kidney problems, presents to the ED with an ulcer on the plantar aspect of her right hallux which she says she just noticed over the weekend, but doesn't know how long it has been there in total. She was sent in by her podiatrist Dr. Bennett for admission with IV antibiotics with concern for infection. She states that she has a history of ulceration on her left foot which she was treated with IV Cipro over the summer. She states that the new ulceration is not causing her any pain and she has not had any fevers, chills, nausea, or vomiting. She notes that she had some drainage in her sock yesterday and today. She denies fevers, chills, nausea, or vomiting.      REVIEW OF SYSTEMS:    General:  no weakness; no fevers, no chills  Skin/Breast: ulcer with surrounding erythema right hallux  Respiratory and Thorax: no SOB, no cough  Cardiovascular:	No chest pain  Gastrointestinal:	 no nausea, vomiting , diarrhea  Genitourinary:	no dysuria, no difficulty urinating, no hematuria  Musculoskeletal:	no weakness, no joint swelling/pain  Neurological: no focal weakness/numbness  Endocrine: no polyuria, no polydipsia    PAST MEDICAL & SURGICAL HISTORY:  IDDM (insulin dependent diabetes mellitus)  DM (diabetes mellitus)  HLD (hyperlipidemia)  HTN (hypertension)  No significant past surgical history      MEDICATIONS  (STANDING):  Furosemide 40mg  Metoprolol 100mg  Atorvastatin 20mg  Terazosin 5mg  Lantus solastar 30  Baby aspirin  Multivitamins      MEDICATIONS  (PRN):      Allergies    penicillin (Rash)    Intolerances        FAMILY HISTORY:    Father and brother-DM                        11.4   10.9  )-----------( 278      ( 31 Jan 2018 14:45 )             33.6                                                              Medical Imaging: 3 views of the right foot. Defect of soft tissue correlating to the ulcer site, no soft tissue or subcutaneous gas. Hallux and first metatarsal bone with no apparent bony erosions or fractures present.      PE:   GEN: Patient is a 67y well developed, well nourished Female, alert, awake and oriented to person, place and time in no acute distress.     Extremities   Vascular:  DP/PT pulses 2/4 b/l, CFT <3sec. +Hair growth on feet  Derm: 3.5cm x 2.5cm Cardenas grade 1 ulceration located on plantar aspect of right hallux extending proximally toward the 1st MPJ with central black eschar and surrounding mild purulent-light brownish drainage. Periwound erythema surrounding the hallux and extending proximally to the 1st MPJ and dorsally on the hallux  slightly warm to touch. No streaking, no malodor, no PTB, No undermining.   Neuro: Protective sensation slightly diminished, gross sensation intact  MSK: MMT 5/5, normal ROM. Patient is a 67y old  Female who presents with a chief complaint of right foot ulcer     HPI:  67 year old female with PMH of DM, HLD, HTN, reported kidney problems, presents to the ED with an ulcer on the plantar aspect of her right hallux which she says she just noticed over the weekend, but doesn't know how long it has been there in total. She was sent in by her podiatrist Dr. Bennett for admission with IV antibiotics with concern for infection. She states that she has a history of ulceration on her left foot which she was treated with IV Cipro over the summer. She states that the new ulceration is not causing her any pain and she has not had any fevers, chills, nausea, or vomiting. She notes that she had some drainage in her sock yesterday and today. She denies fevers, chills, nausea, or vomiting.      REVIEW OF SYSTEMS:    General:  no weakness; no fevers, no chills  Skin/Breast: ulcer with surrounding erythema right hallux  Respiratory and Thorax: no SOB, no cough  Cardiovascular:	No chest pain  Gastrointestinal:	 no nausea, vomiting , diarrhea  Genitourinary:	no dysuria, no difficulty urinating, no hematuria  Musculoskeletal:	no weakness, no joint swelling/pain  Neurological: no focal weakness/numbness  Endocrine: no polyuria, no polydipsia    PAST MEDICAL & SURGICAL HISTORY:  IDDM (insulin dependent diabetes mellitus)  DM (diabetes mellitus)  HLD (hyperlipidemia)  HTN (hypertension)  No significant past surgical history      MEDICATIONS  (STANDING):  Furosemide 40mg  Metoprolol 100mg  Atorvastatin 20mg  Terazosin 5mg  Lantus solastar 30  Baby aspirin  Multivitamins      MEDICATIONS  (PRN):      Allergies    penicillin (Rash)    Intolerances        FAMILY HISTORY:    Father and brother-DM                        11.4   10.9  )-----------( 278      ( 31 Jan 2018 14:45 )             33.6                                                              Medical Imaging: 3 views of the right foot. Defect of soft tissue correlating to the ulcer site, no soft tissue or subcutaneous gas.  No apparent bony erosions, osteomyelitis or fractures present.      PE:   GEN: Patient is a 67y well developed, well nourished Female, alert, awake and oriented to person, place and time in no acute distress.     Extremities   Vascular:  DP/PT pulses 2/4 b/l, CFT <3sec. +Hair growth on feet  Derm: 3.5cm x 2.5cm Cardenas grade 1 ulceration located on plantar aspect of right hallux extending proximally toward the 1st MPJ with central black eschar and surrounding mild purulent-light brownish drainage. Periwound erythema surrounding the hallux and extending proximally to the 1st MPJ and dorsally on the hallux,  slightly warm to touch compared to rest of foot. No streaking, no malodor, no PTB, No undermining.   Neuro: Protective sensation slightly diminished, gross sensation intact  MSK: MMT 5/5, normal ROM. Patient is a 67y old  Female who presents with a chief complaint of right foot ulcer     HPI:  67 year old female with PMH of DM, HLD, HTN, reported kidney problems, presents to the ED with an ulcer on the plantar aspect of her right hallux which she says she just noticed over the weekend, but doesn't know how long it has been there in total. She was sent in by her podiatrist Dr. Bennett for admission with IV antibiotics with concern for infection. She states that she has a history of ulceration on her left foot which she was treated with IV Cipro over the summer. She is a patient of Dr. Alvarado's for ID follow-up. She states that the new ulceration is not causing her any pain and she has not had any fevers, chills, nausea, or vomiting. She notes that she had some drainage in her sock yesterday and today. She denies fevers, chills, nausea, or vomiting.      REVIEW OF SYSTEMS:    General:  no weakness; no fevers, no chills  Skin/Breast: ulcer with surrounding erythema right hallux  Respiratory and Thorax: no SOB, no cough  Cardiovascular:	No chest pain  Gastrointestinal:	 no nausea, vomiting , diarrhea  Genitourinary:	no dysuria, no difficulty urinating, no hematuria  Musculoskeletal:	no weakness, no joint swelling/pain  Neurological: no focal weakness/numbness  Endocrine: no polyuria, no polydipsia    PAST MEDICAL & SURGICAL HISTORY:  IDDM (insulin dependent diabetes mellitus)  DM (diabetes mellitus)  HLD (hyperlipidemia)  HTN (hypertension)  No significant past surgical history      MEDICATIONS  (STANDING):  Furosemide 40mg  Metoprolol 100mg  Atorvastatin 20mg  Terazosin 5mg  Lantus solastar 30  Baby aspirin  Multivitamins      MEDICATIONS  (PRN):      Allergies    penicillin (Rash)    Intolerances        FAMILY HISTORY:    Father and brother-DM                        11.4   10.9  )-----------( 278      ( 31 Jan 2018 14:45 )             33.6                                                              Medical Imaging: 3 views of the right foot. Defect of soft tissue correlating to the ulcer site, no soft tissue or subcutaneous gas.  No apparent bony erosions, osteomyelitis or fractures present.      PE:   GEN: Patient is a 67y well developed, well nourished Female, alert, awake and oriented to person, place and time in no acute distress.     Extremities   Vascular:  DP/PT pulses 2/4 b/l, CFT <3sec. +Hair growth on feet  Derm: 3.5cm x 2.5cm Cardenas grade 1 ulceration located on plantar aspect of right hallux extending proximally toward the 1st MPJ with central black eschar and surrounding mild purulent-light brownish drainage. Periwound erythema surrounding the hallux and extending proximally to the 1st MPJ and dorsally on the hallux,  slightly warm to touch compared to rest of foot. No streaking, no malodor, no PTB, No undermining.   Neuro: Protective sensation slightly diminished, gross sensation intact  MSK: MMT 5/5, normal ROM.

## 2018-01-31 NOTE — ED ADULT NURSE NOTE - OBJECTIVE STATEMENT
Pt presents with new diabetic ulcer, + redness and swelling to great toe, with ulcer to right base of foot (ball of foot) that first appears on Sunday of this week. Pt denies any trauma. Pt states that she presented to her PCP and was instructed to come to ED for concern of infection. Pt denies any pain, no fevers, no cp, no sob, no n/v, no changes to bowels, no urinary complaints, denies pain, no drainage or limitation in mobility of right foot. Pt denies any numbness or tingling, +CSM +PP to right foot.

## 2018-02-01 ENCOUNTER — TRANSCRIPTION ENCOUNTER (OUTPATIENT)
Age: 68
End: 2018-02-01

## 2018-02-01 DIAGNOSIS — E78.00 PURE HYPERCHOLESTEROLEMIA, UNSPECIFIED: ICD-10-CM

## 2018-02-01 DIAGNOSIS — I10 ESSENTIAL (PRIMARY) HYPERTENSION: ICD-10-CM

## 2018-02-01 LAB
24R-OH-CALCIDIOL SERPL-MCNC: 27.4 NG/ML — LOW (ref 30–80)
ALBUMIN SERPL ELPH-MCNC: 3.7 G/DL — SIGNIFICANT CHANGE UP (ref 3.3–5)
ALP SERPL-CCNC: 76 U/L — SIGNIFICANT CHANGE UP (ref 40–120)
ALT FLD-CCNC: 15 U/L — SIGNIFICANT CHANGE UP (ref 10–45)
ANION GAP SERPL CALC-SCNC: 10 MMOL/L — SIGNIFICANT CHANGE UP (ref 5–17)
APPEARANCE UR: CLEAR — SIGNIFICANT CHANGE UP
AST SERPL-CCNC: 21 U/L — SIGNIFICANT CHANGE UP (ref 10–40)
BILIRUB SERPL-MCNC: 0.4 MG/DL — SIGNIFICANT CHANGE UP (ref 0.2–1.2)
BILIRUB UR-MCNC: NEGATIVE — SIGNIFICANT CHANGE UP
BUN SERPL-MCNC: 29 MG/DL — HIGH (ref 7–23)
CALCIUM SERPL-MCNC: 9.4 MG/DL — SIGNIFICANT CHANGE UP (ref 8.4–10.5)
CALCIUM SERPL-MCNC: 9.8 MG/DL — SIGNIFICANT CHANGE UP (ref 8.4–10.5)
CHLORIDE SERPL-SCNC: 103 MMOL/L — SIGNIFICANT CHANGE UP (ref 96–108)
CO2 SERPL-SCNC: 26 MMOL/L — SIGNIFICANT CHANGE UP (ref 22–31)
COLOR SPEC: YELLOW — SIGNIFICANT CHANGE UP
CREAT SERPL-MCNC: 1.47 MG/DL — HIGH (ref 0.5–1.3)
DIFF PNL FLD: NEGATIVE — SIGNIFICANT CHANGE UP
GLUCOSE BLDC GLUCOMTR-MCNC: 106 MG/DL — HIGH (ref 70–99)
GLUCOSE BLDC GLUCOMTR-MCNC: 183 MG/DL — HIGH (ref 70–99)
GLUCOSE BLDC GLUCOMTR-MCNC: 191 MG/DL — HIGH (ref 70–99)
GLUCOSE BLDC GLUCOMTR-MCNC: 225 MG/DL — HIGH (ref 70–99)
GLUCOSE SERPL-MCNC: 103 MG/DL — HIGH (ref 70–99)
GLUCOSE UR QL: 100
HBA1C BLD-MCNC: <4.2 % — SIGNIFICANT CHANGE UP (ref 4–5.6)
HCT VFR BLD CALC: 30.6 % — LOW (ref 34.5–45)
HGB BLD-MCNC: 10.2 G/DL — LOW (ref 11.5–15.5)
KETONES UR-MCNC: NEGATIVE — SIGNIFICANT CHANGE UP
LEUKOCYTE ESTERASE UR-ACNC: NEGATIVE — SIGNIFICANT CHANGE UP
MCHC RBC-ENTMCNC: 30.9 PG — SIGNIFICANT CHANGE UP (ref 27–34)
MCHC RBC-ENTMCNC: 33.3 G/DL — SIGNIFICANT CHANGE UP (ref 32–36)
MCV RBC AUTO: 92.7 FL — SIGNIFICANT CHANGE UP (ref 80–100)
NITRITE UR-MCNC: NEGATIVE — SIGNIFICANT CHANGE UP
PH UR: 5 — SIGNIFICANT CHANGE UP (ref 5–8)
PLATELET # BLD AUTO: 258 K/UL — SIGNIFICANT CHANGE UP (ref 150–400)
POTASSIUM SERPL-MCNC: 3.8 MMOL/L — SIGNIFICANT CHANGE UP (ref 3.5–5.3)
POTASSIUM SERPL-SCNC: 3.8 MMOL/L — SIGNIFICANT CHANGE UP (ref 3.5–5.3)
PROT SERPL-MCNC: 6.8 G/DL — SIGNIFICANT CHANGE UP (ref 6–8.3)
PROT SERPL-MCNC: 6.8 G/DL — SIGNIFICANT CHANGE UP (ref 6–8.3)
PROT SERPL-MCNC: 7.4 G/DL — SIGNIFICANT CHANGE UP (ref 6–8.3)
PROT UR-MCNC: (no result) MG/DL
PTH-INTACT FLD-MCNC: 43 PG/ML — SIGNIFICANT CHANGE UP (ref 15–65)
RBC # BLD: 3.3 M/UL — LOW (ref 3.8–5.2)
RBC # FLD: 12.1 % — SIGNIFICANT CHANGE UP (ref 10.3–16.9)
SODIUM SERPL-SCNC: 139 MMOL/L — SIGNIFICANT CHANGE UP (ref 135–145)
SP GR SPEC: 1.02 — SIGNIFICANT CHANGE UP (ref 1–1.03)
TSH SERPL-MCNC: 1.86 UIU/ML — SIGNIFICANT CHANGE UP (ref 0.35–4.94)
UROBILINOGEN FLD QL: 0.2 E.U./DL — SIGNIFICANT CHANGE UP
WBC # BLD: 8.4 K/UL — SIGNIFICANT CHANGE UP (ref 3.8–10.5)
WBC # FLD AUTO: 8.4 K/UL — SIGNIFICANT CHANGE UP (ref 3.8–10.5)

## 2018-02-01 PROCEDURE — 95018 ALL TSTG PERQ&IQ DRUGS/BIOL: CPT | Mod: GC

## 2018-02-01 PROCEDURE — 99253 IP/OBS CNSLTJ NEW/EST LOW 45: CPT

## 2018-02-01 PROCEDURE — 99254 IP/OBS CNSLTJ NEW/EST MOD 60: CPT | Mod: GC

## 2018-02-01 RX ORDER — CEFEPIME 1 G/1
2000 INJECTION, POWDER, FOR SOLUTION INTRAMUSCULAR; INTRAVENOUS EVERY 24 HOURS
Qty: 0 | Refills: 0 | Status: DISCONTINUED | OUTPATIENT
Start: 2018-02-01 | End: 2018-02-01

## 2018-02-01 RX ORDER — VANCOMYCIN HCL 1 G
1000 VIAL (EA) INTRAVENOUS EVERY 24 HOURS
Qty: 0 | Refills: 0 | Status: DISCONTINUED | OUTPATIENT
Start: 2018-02-01 | End: 2018-02-03

## 2018-02-01 RX ORDER — CEFEPIME 1 G/1
2000 INJECTION, POWDER, FOR SOLUTION INTRAMUSCULAR; INTRAVENOUS EVERY 24 HOURS
Qty: 0 | Refills: 0 | Status: DISCONTINUED | OUTPATIENT
Start: 2018-02-01 | End: 2018-02-05

## 2018-02-01 RX ORDER — CEFEPIME 1 G/1
INJECTION, POWDER, FOR SOLUTION INTRAMUSCULAR; INTRAVENOUS
Qty: 0 | Refills: 0 | Status: DISCONTINUED | OUTPATIENT
Start: 2018-02-01 | End: 2018-02-01

## 2018-02-01 RX ORDER — METRONIDAZOLE 500 MG
500 TABLET ORAL EVERY 8 HOURS
Qty: 0 | Refills: 0 | Status: DISCONTINUED | OUTPATIENT
Start: 2018-02-01 | End: 2018-02-05

## 2018-02-01 RX ADMIN — INSULIN GLARGINE 25 UNIT(S): 100 INJECTION, SOLUTION SUBCUTANEOUS at 22:48

## 2018-02-01 RX ADMIN — Medication 2: at 13:07

## 2018-02-01 RX ADMIN — Medication 6: at 17:39

## 2018-02-01 RX ADMIN — Medication 81 MILLIGRAM(S): at 11:16

## 2018-02-01 RX ADMIN — Medication 100 MILLIGRAM(S): at 22:06

## 2018-02-01 RX ADMIN — CEFEPIME 2000 MILLIGRAM(S): 1 INJECTION, POWDER, FOR SOLUTION INTRAMUSCULAR; INTRAVENOUS at 19:01

## 2018-02-01 RX ADMIN — TAMSULOSIN HYDROCHLORIDE 0.8 MILLIGRAM(S): 0.4 CAPSULE ORAL at 22:24

## 2018-02-01 RX ADMIN — ATORVASTATIN CALCIUM 20 MILLIGRAM(S): 80 TABLET, FILM COATED ORAL at 22:06

## 2018-02-01 RX ADMIN — Medication 2: at 22:25

## 2018-02-01 RX ADMIN — Medication 500 MILLIGRAM(S): at 22:06

## 2018-02-01 RX ADMIN — Medication 250 MILLIGRAM(S): at 16:50

## 2018-02-01 NOTE — CONSULT NOTE ADULT - ASSESSMENT
67F PMHx of DM2, CKD, who presented with ulcer of the right toe that evolved over 6 days. Based on the size and location of the wound, as well as the rapid development, there is concern for osteomyelitis.   Recommend  MRI foot to r/o osteomyelitis  Continue vancomycin, please obtain a trough before the 4th dose  Continue cefepime 2g q24  Start Flagyl 500 q8

## 2018-02-01 NOTE — DISCHARGE NOTE ADULT - HOSPITAL COURSE
66 yo female PMH of DM2 on insulin, CKD, who presented with ulcer of right toe admitted for workup, found not to have any osteomyelitis and discharged on oral antibiotics.

## 2018-02-01 NOTE — CHART NOTE - NSCHARTNOTEFT_GEN_A_CORE
Penicillin Allergy Skin Testing    DX: Reported PCN drug allergy -Reported as rash/hives    1) Informed consent was obtained and time-out was performed.    2) Scratch test: NEGATIVE  * Histamine: 5 mm   * Saline diluent: 2 mm  * Pre-Pen: 2 mm  * Penicillin  mm    3) Intradermal test: NEGATIVE  * Saline diluent: 0 mm  * Pre-pen #1: 0 mm (no growth beyond original size)  * Pre-pen #2: 0 mm (no growth beyond original size)  * Penicillin G #1: 0 mm (no growth beyond original size)  * Penicillin G #2: 0 mm (no growth beyond original size)    Procedure was performed successfully without complications.    Results: Negative Penicillin Allergy Skin Testing    Results were explained to the patient and communicated with primary team.    Procedure was performed by Dr. Mcdonald, supervised by Dr. Edwards.     Time face to face 60 minutes with >50% on counseling and coordination of care. Penicillin Allergy Skin Testing    DX: Reported PCN drug allergy -Reported as rash/hives w/ amoxicillin 25 yrs ago. no reported angioedema, anaphylaxis, wheeze/dyspnea, n/v, TEN/SJS associated with reaction at that time.     1) Informed consent was obtained and time-out was performed.    2) Scratch test: NEGATIVE  * Histamine: 5 mm   * Saline diluent: 2 mm  * Pre-Pen: 2 mm  * Penicillin  mm    3) Intradermal test: NEGATIVE  * Saline diluent: 0 mm  * Pre-pen #1: 0 mm (no growth beyond original size)  * Pre-pen #2: 0 mm (no growth beyond original size)  * Penicillin G #1: 0 mm (no growth beyond original size)  * Penicillin G #2: 0 mm (no growth beyond original size)    Procedure was performed successfully without complications.    Results: Negative Penicillin Allergy Skin Testing    Results were explained to the patient and communicated with primary team.    Procedure was performed by Dr. Mcdonald, supervised by Dr. Edwards.     Time face to face 60 minutes with >50% on counseling and coordination of care Penicillin Allergy Skin Testing    DX: Reported PCN drug allergy -Reported as rash/hives w/ amoxicillin 25 yrs ago. no reported angioedema, anaphylaxis, wheeze/dyspnea, n/v, TEN/SJS associated with reaction at that time.     1) Informed consent was obtained and time-out was performed.    2) Scratch test: NEGATIVE  * Histamine: 5 mm   * Saline diluent: 2 mm  * Pre-Pen: 2 mm  * Penicillin  mm    3) Intradermal test: NEGATIVE  * Saline diluent: 0 mm  * Pre-pen #1: 0 mm (no growth beyond original size)  * Pre-pen #2: 0 mm (no growth beyond original size)  * Penicillin G #1: 0 mm (no growth beyond original size)  * Penicillin G #2: 0 mm (no growth beyond original size)    Procedure was performed successfully without complications.     exam  HEENT: no lip or tongue swelling, no facial edema   cv: rrr, s1 s2 nml  resp: cta bl      Results: Negative Penicillin Allergy Skin Testing    Results were explained to the patient and communicated with primary team.    Procedure was performed by Dr. Mcdonald, supervised by Dr. Edwards.     Time face to face 60 minutes with >50% on counseling and coordination of care

## 2018-02-01 NOTE — DISCHARGE NOTE ADULT - PATIENT PORTAL LINK FT
“You can access the [employer] Patient Portal, offered by [facility], by registering with the following website: [email or web address]”

## 2018-02-01 NOTE — CONSULT NOTE ADULT - SUBJECTIVE AND OBJECTIVE BOX
Patient is a 67y old  Female who presents with a chief complaint of Foot ulcer (2018 16:49)      HPI:  Patient is a 68 yo female pmh of DM2 on insulin, CKD, who presented with ulcer of right toe. Patient stated she noticed the ulcer 2 days ago, unclear the trigger, thought it was just ink marker, denied pain, no exudate, no open wound, never had ulcer in the past, denied fever/chill, no n/v. Been following up with Dr. Lee, who sent the patient here for IV antibiotics.  Patient was treated for osteo of left foot with cipro and clinda IV outpatient 2017, followed by Dr. Alvarado previously.  In the ED, patient given vancomycin, ESR and CRP elevated, Xray of foot showed no osteo. Evaluated by podiatry. (2018 16:49)      PAST MEDICAL & SURGICAL HISTORY:  CKD (chronic kidney disease)  IDDM (insulin dependent diabetes mellitus)  DM (diabetes mellitus)  HLD (hyperlipidemia)  HTN (hypertension)  No significant past surgical history      PREVIOUS DIAGNOSTIC TESTING:      ECHO  FINDINGS:    STRESS  FINDINGS:    CATHETERIZATION  FINDINGS:    MEDICATIONS  (STANDING):  aspirin  chewable 81 milliGRAM(s) Oral daily  atorvastatin 20 milliGRAM(s) Oral at bedtime  dextrose 5%. 1000 milliLiter(s) (50 mL/Hr) IV Continuous <Continuous>  dextrose 50% Injectable 12.5 Gram(s) IV Push once  dextrose 50% Injectable 25 Gram(s) IV Push once  dextrose 50% Injectable 25 Gram(s) IV Push once  insulin glargine Injectable (LANTUS) 25 Unit(s) SubCutaneous at bedtime  insulin lispro (HumaLOG) corrective regimen sliding scale   SubCutaneous Before meals and at bedtime  metoprolol succinate  milliGRAM(s) Oral daily  tamsulosin 0.8 milliGRAM(s) Oral at bedtime    MEDICATIONS  (PRN):  dextrose Gel 1 Dose(s) Oral once PRN Blood Glucose LESS THAN 70 milliGRAM(s)/deciliter  glucagon  Injectable 1 milliGRAM(s) IntraMuscular once PRN Glucose LESS THAN 70 milligrams/deciliter      FAMILY HISTORY:  No pertinent family history in first degree relatives      SOCIAL HISTORY:    CIGARETTES:    ALCOHOL:    REVIEW OF SYSTEMS:  CONSTITUTIONAL: No fever, weight loss, or fatigue  EYES: No eye pain, visual disturbances, or discharge  ENMT:  No difficulty hearing, tinnitus, vertigo; No sinus or throat pain  NECK: No pain or stiffness  RESPIRATORY: No cough, wheezing, chills or hemoptysis; No Shortness of Breath  CARDIOVASCULAR: No chest pain, palpitations, passing out, dizziness, or leg swelling  GASTROINTESTINAL: No abdominal or epigastric pain. No nausea, vomiting, or hematemesis; No diarrhea or constipation. No melena or hematochezia.  GENITOURINARY: No dysuria, frequency, hematuria, or incontinence  NEUROLOGICAL: No headaches, memory loss, loss of strength, numbness, or tremors  SKIN: ulcer on underside of right foot behind great toe  LYMPH Nodes: No enlarged glands  ENDOCRINE: No heat or cold intolerance; No hair loss  MUSCULOSKELETAL: No joint pain or swelling; No muscle, back, or extremity pain  PSYCHIATRIC: No depression, anxiety, mood swings, or difficulty sleeping  HEME/LYMPH: No easy bruising, or bleeding gums  ALLERY AND IMMUNOLOGIC: No hives or eczema	  Vital Signs Last 24 Hrs  T(C): 36.7 (2018 05:39), Max: 37 (2018 13:14)  T(F): 98.1 (2018 05:39), Max: 98.6 (2018 13:14)  HR: 56 (2018 05:39) (56 - 69)  BP: 127/55 (2018 05:39) (127/55 - 188/70)  BP(mean): --  RR: 18 (2018 05:39) (18 - 18)  SpO2: 96% (2018 05:39) (95% - 100%)    PHYSICAL EXAM:  Appearance: Normal	  HEENT:   Normal oral mucosa, PERRL, EOMI	  Lymphatic: No lymphadenopathy  Cardiovascular: Normal S1 S2, No JVD, No murmurs, No edema  Respiratory: Lungs clear to auscultation	  Psychiatry: A & O x 3, Mood & affect appropriate  Gastrointestinal:  Soft, Non-tender, + BS	  Skin: No rashes, No ecchymoses, No cyanosis  Neurologic: Non-focal  Extremities: Normal range of motion, No clubbing, cyanosis or edema. Ulcer on underside of right foot behind great toe  Vascular: Peripheral pulses palpable 2+ bilaterally      INTERPRETATION OF TELEMETRY:    ECG:    I&O's Detail      LABS:                        10.2   8.4   )-----------( 258      ( 2018 05:50 )             30.6     02    139  |  103  |  29<H>  ----------------------------<  103<H>  3.8   |  26  |  1.47<H>    Ca    9.4      2018 05:52    TPro  7.4  /  Alb  3.7  /  TBili  0.4  /  DBili  x   /  AST  21  /  ALT  15  /  AlkPhos  76          PT/INR - ( 2018 14:45 )   PT: 11.6 sec;   INR: 1.04          PTT - ( 2018 14:45 )  PTT:32.0 sec  Urinalysis Basic - ( 2018 01:23 )    Color: Yellow / Appearance: Clear / S.020 / pH: x  Gluc: x / Ketone: NEGATIVE  / Bili: Negative / Urobili: 0.2 E.U./dL   Blood: x / Protein: Trace mg/dL / Nitrite: NEGATIVE   Leuk Esterase: NEGATIVE / RBC: < 5 /HPF / WBC < 5 /HPF   Sq Epi: x / Non Sq Epi: 0-5 /HPF / Bacteria: Present /HPF      I&O's Summary    BNP  RADIOLOGY & ADDITIONAL STUDIES:

## 2018-02-01 NOTE — DISCHARGE NOTE ADULT - NSFTFHOME1RD_GEN_ALL_CORE
Pain greater than 7 on scale of 10 on ambulation Reason specified below/Pain greater than 7 on scale of 10 on ambulation

## 2018-02-01 NOTE — PATIENT PROFILE ADULT. - ABILITY TO HEAR (WITH HEARING AID OR HEARING APPLIANCE IF NORMALLY USED):
Adequate: hears normal conversation without difficulty [facility] Internal Medicine Progress Note  Patient: Dewey Grew 80 y o  female   MRN: [id number]  PCP: Berenice Cottrell DO  Unit/Bed#: [address] [place] [address] Encounter: [id number]  Date Of Visit: 12/15/17    Subjective:   Dyspnea improving with 2 LNC O2  Less cough  Afebrile  She does not use O2 at home    Objective:   Vitals:   Temp (24hrs), Av 3 °F (36 8 °C), Min:97 9 °F (36 6 °C), Max:98 8 °F (37 1 °C)    HR:  [55-82] 80  Resp:  [18-20] 19  BP: (166-180)/(75-91) 180/91  SpO2:  [93 %-96 %] 94 %  Body mass index is 39 31 kg/m²  No intake or output data in the 24 hours ending 12/15/17 1117    Physical Exam:  General: Obese, NAD, Port-A-Cath in place at right chest  HEENT: EOMI, anicteric, oral moist, neck supple, no mass or JVD  Chest: CTAB, mild expiratory wheeze  Cardiac: RRR, S1/S2, No murmur  Abd: S/ND/NT/BS+  MSK: Chronic LLE edema from lymphedema, pedal pulses intact  Neuro: AAOx3, moving all extremities  Psychiatric: Mood with normal affect    Additional Data:     Labs:    Results from last 7 days  Lab Units 17  0532  17  0944   WBC Thousand/uL 9 50  < > 6 20   HEMOGLOBIN g/dL 12 7  < > 13 3   HEMATOCRIT % 39 6  < > 41 2   PLATELETS Thousands/uL 171  < > 165   NEUTROS PCT %  --   --  76*   LYMPHS PCT %  --   --  11*   MONOS PCT %  --   --  6   EOS PCT %  --   --  6   < > = values in this interval not displayed  Results from last 7 days  Lab Units 17  0532  17  0944   SODIUM mmol/L 141  < > 144   POTASSIUM mmol/L 4 3  < > 3 5   CHLORIDE mmol/L 105  < > 105   CO2 mmol/L 32  < > 33*   BUN mg/dL 21  < > 12   CREATININE mg/dL 1 05  < > 0 99   CALCIUM mg/dL 8 5  < > 8 6   TOTAL PROTEIN g/dL  --   --  6 5   BILIRUBIN TOTAL mg/dL  --   --  0 30   ALK PHOS U/L  --   --  117*   ALT U/L  --   --  16   AST U/L  --   --  16   GLUCOSE RANDOM mg/dL 163*  < > 108   < > = values in this interval not displayed      Results from last 7 days  Lab Units 17  0944   INR  0 98       No results found for this or any previous visit (from the past 24 hour(s))  Cultures:     Results from last 7 days  Lab Units 12/13/17  0653 12/13/17  0505 12/11/17  1004 12/11/17  0947 12/11/17  0944   BLOOD CULTURE  No Growth at 24 hrs  No Growth at 24 hrs   --    Staphylococcus coagulase negative*   Staphylococcus coagulase negative*   GRAM STAIN RESULT   --   --   --  Gram positive cocci in clusters Gram positive cocci in clusters   INFLUENZA A PCR   --   --  None Detected  --   --    INFLUENZA B PCR   --   --  None Detected  --   --    RSV PCR   --   --  None Detected  --   --        Imaging:  Xr Chest 1 View Portable    Result Date: 12/11/2017  Narrative: CHEST INDICATION:  Chest pain and productive cough  COMPARISON:  10/12/2017  VIEWS:   AP frontal IMAGES:  1 FINDINGS:  A right-sided catheter terminates at the caval atrial junction  No pneumothorax  Cardiomediastinal silhouette appears unremarkable  Left basilar subsegmental atelectasis with hemidiaphragm elevation noted  Tiny left pleural effusion suspected  Upper lobe and right lung are clear  Visualized osseous structures appear within normal limits for the patient's age  Impression: Left basilar atelectasis with tiny pleural effusion and hemidiaphragm elevation  Workstation performed: [id number]     Vas Lower Limb Venous Duplex Study, Unilateral/limited    Result Date: 12/11/2017  Narrative:  [facility] REPORT [facility]INICAL: Indications: Edema, unspecified [R60 9]  Patient presents with left lower extremity edema x several months  The patient has history of malignancy  Clinical:  FINDINGS:  Left     Impression       CFV      Normal (Patent)     CONCLUSION:  Impression: RIGHT LOWER LIMB LIMITED: NORMAL Evaluation shows no evidence of thrombus in the common femoral vein  Doppler evaluation shows a normal response to augmentation maneuvers    LEFT LOWER LIMB: NORMAL No evidence of acute or chronic deep vein thrombosis No evidence of superficial thrombophlebitis noted  Doppler evaluation shows a normal response to augmentation maneuvers  Popliteal, posterior tibial and anterior tibial arterial Doppler waveforms are triphasic    SIGNATURE: Electronically Signed by: Fidencio Bloch on 2017-12-11 06:10:35 PM    Imaging Reports Reviewed by myself    Last 24 Hours Medication List:     Current Facility-Administered Medications:     acetaminophen (TYLENOL) tablet 650 mg, 650 mg, Oral, Q6H PRN, Esperanza Branham MD, 650 mg at 12/15/17 0044    ALPRAZolam (XANAX) tablet 0 5 mg, 0 5 mg, Oral, HS PRN, Esperanza Branham MD, 0 5 mg at 12/13/17 2206    aluminum-magnesium hydroxide-simethicone (MYLANTA) 200-200-20 mg/5 mL oral suspension 30 mL, 30 mL, Oral, Q6H PRN, Esperanza Brnaham MD    amLODIPine (NORVASC) tablet 5 mg, 5 mg, Oral, Daily, Angelo Liu MD, 5 mg at 12/15/17 1020    aspirin (ECOTRIN LOW STRENGTH) EC tablet 81 mg, 81 mg, Oral, Daily, Esperanza Branham MD, 81 mg at 12/15/17 1019    atorvastatin (LIPITOR) tablet 10 mg, 10 mg, Oral, Daily With Trent Perrin MD, 10 mg at 12/14/17 1718    benzonatate (TESSALON PERLES) capsule 100 mg, 100 mg, Oral, TID PRN, Samantha Kowalski DO, 100 mg at 12/14/17 1718    clopidogrel (PLAVIX) tablet 75 mg, 75 mg, Oral, Daily, Esperanza Branham MD, 75 mg at 12/15/17 1019    dextromethorphan-guaiFENesin (ROBITUSSIN DM)  mg/5 mL oral syrup 10 mL, 10 mL, Oral, Q4H PRN, GILDA Melgar, 10 mL at 12/14/17 2219    enoxaparin (LOVENOX) subcutaneous injection 40 mg, 40 mg, Subcutaneous, Daily, Esperanza Branham MD, 40 mg at 12/15/17 1020    heparin lock flush 100 units/mL injection 300 Units, 300 Units, Intracatheter, Q1H PRN, Angelo Liu MD, 300 Units at 12/14/17 0524    ipratropium-albuterol (DUO-NEB) 0 5-2 5 mg/mL inhalation solution 3 mL, 3 mL, Nebulization, Q6H, Esperanza Branham MD, 3 mL at 12/15/17 0722    levalbuterol (XOPENEX) inhalation solution 0 63 mg, 0 63 mg, Nebulization, Q2H PRN, Angelo Liu MD    methylPREDNISolone stains showing staph coag neg from 2/2 bottles  Monitor off abx - she remains afebrile and normal WBC  ID consult appreciated     Atrial fibrillation  Pt refused anticoagulation  Continue metoprolol, aspirin, Plavix      Left lower extremity edema  Pt is known to have history of lymphedema  US LE venous no evidence of DVT    Left carotid stenosis  Pt refused carotid end arterectomy  Continue aspirin and Plavix      Hyperlipidemia  Continue Lipitor     GERD  Continue PPT     Neuropathy  Continue Lyrica    History of Neuroendocrine tumor/Merkel cell carcinoma   Seen by Hem/Onc Dr Kavin Grant in 9/2017 - no evidence of recurrence    DVT Prophylaxis: on Lovenox sc, SCD  Code Status: Level 1 - Full Code  Disposition: anticipate d/c home once clinically improved  Patient Centered Rounds: I have performed bedside rounds with nursing staff today  Discussions with Specialists or Other Care Team Provider: Yes    Education and Discussions with Family / Patient:Yes    Time Spent for Care: 20 minutes  More than 50% of total time spent on counseling and coordination of care as described above      Current Length of Stay: 4 day(s)    Current Patient Status: Inpatient     Signed by:  Jennifer Johnson MD  Attending Hospitalist  Page#: [phone] (Hours 7am to 7pm)  12/15/2017 11:17 AM

## 2018-02-01 NOTE — CONSULT NOTE ADULT - SUBJECTIVE AND OBJECTIVE BOX
INFECTIOUS DISEASE SERVICE INITIAL CONSULTATION NOTE    HPI:  Patient is a 66 yo female pmh of DM2 on insulin, CKD, who presented with ulcer of right toe. Patient stated she noticed the ulcer 2 days ago, unclear the trigger, thought it was just ink marker, denied pain, no exudate, no open wound, never had ulcer in the past, denied fever/chill, no n/v. Been following up with Dr. Lee, who sent the patient here for IV antibiotics.  Patient was treated for osteo of left foot with cipro and clinda IV outpatient 2017, followed by Dr. Alvarado previously.  In the ED, patient given vancomycin, ESR and CRP elevated, Xray of foot showed no osteo. Evaluated by podiatry. (2018 16:49)    Patient reports -     PAST MEDICAL & SURGICAL HISTORY:  CKD (chronic kidney disease)  IDDM (insulin dependent diabetes mellitus)  DM (diabetes mellitus)  HLD (hyperlipidemia)  HTN (hypertension)  No significant past surgical history      REVIEW OF SYSTEMS:  Constitutional: no weakness, no fevers, no chills  Dermatologic: no rash  Respiratory: no SOB, no cough  Cardiovascular: no chest pain, no palpitations  Gastrointestinal: no nausea, no vomiting, no diarrhea  Genitourinary: no dysuria, no urinary frequency, no hematuria, no urinary retention  Musculoskeletal:	no weakness, no joint swelling/pain  Neurological: no focal weakness or numbness  Endocrine: no polyuria, no polydipsia    ACTIVE ANTIMICROBIAL/ANTIBIOTIC MEDICATIONS:  vancomycin, cefepime    OTHER MEDICATIONS:  aspirin  chewable 81 milliGRAM(s) Oral daily  atorvastatin 20 milliGRAM(s) Oral at bedtime  dextrose 5%. 1000 milliLiter(s) IV Continuous <Continuous>  dextrose 50% Injectable 12.5 Gram(s) IV Push once  dextrose 50% Injectable 25 Gram(s) IV Push once  dextrose 50% Injectable 25 Gram(s) IV Push once  dextrose Gel 1 Dose(s) Oral once PRN  glucagon  Injectable 1 milliGRAM(s) IntraMuscular once PRN  insulin glargine Injectable (LANTUS) 25 Unit(s) SubCutaneous at bedtime  insulin lispro (HumaLOG) corrective regimen sliding scale   SubCutaneous Before meals and at bedtime  metoprolol succinate  milliGRAM(s) Oral daily  Pre Pen Benzylpenicilloyl Polylysine 1 Vial(s),Penicillin G Potassium (10,000 units/mL) 10 milliLiter(s),Histamine (1mg/ml) 0.5 milliLiter(s),0.9% Sodium Chloride vial 10 milliLiter(s) 1 Vial(s) IntraDermal once  tamsulosin 0.8 milliGRAM(s) Oral at bedtime      ALLERGIES:  Allergies    penicillin (Rash)    Intolerances        SOCIAL HISTORY:    FAMILY HISTORY:  No pertinent family history in first degree relatives      VITAL SIGNS:  ICU Vital Signs Last 24 Hrs  T(C): 36.7 (2018 05:39), Max: 37 (2018 13:14)  T(F): 98.1 (2018 05:39), Max: 98.6 (2018 13:14)  HR: 56 (2018 05:39) (56 - 69)  BP: 127/55 (2018 05:39) (127/55 - 188/70)  BP(mean): --  ABP: --  ABP(mean): --  RR: 18 (2018 05:39) (18 - 18)  SpO2: 96% (2018 05:39) (95% - 100%)      PHYSICAL EXAM:  Constitutional: WDWN  Head: NC/AT  Eyes: PERRLA, EOMI; anicteric slcera  ENMT: no rhinorrhea; no sinus tenderness on palpation; no oropharyngeal lesions, erythema, or exudates	  Neck: supple; no JVD or LAD  Respiratory: CTA B/L  Cardiovascular: +S1/S2, RRR; no appreciable murmurs  Gastrointestinal: soft, NT/ND; +BSx4, no HSM  Extremities: WWP; no clubbing, cyanosis, or edema  Vascular: 2+ radial, DP/PT pulses B/L  Dermatologic: skin warm and dry; no visible rashes or lesions  Neurologic: AAOx3; no focal deficits    LABS:                        10.2   8.4   )-----------( 258      ( 2018 05:50 )             30.6     02-    139  |  103  |  29<H>  ----------------------------<  103<H>  3.8   |  26  |  1.47<H>    Ca    9.4      2018 05:52    TPro  7.4  /  Alb  3.7  /  TBili  0.4  /  DBili  x   /  AST  21  /  ALT  15  /  AlkPhos  76  -    PT/INR - ( 2018 14:45 )   PT: 11.6 sec;   INR: 1.04          PTT - ( 2018 14:45 )  PTT:32.0 sec  Urinalysis Basic - ( 2018 01:23 )    Color: Yellow / Appearance: Clear / S.020 / pH: x  Gluc: x / Ketone: NEGATIVE  / Bili: Negative / Urobili: 0.2 E.U./dL   Blood: x / Protein: Trace mg/dL / Nitrite: NEGATIVE   Leuk Esterase: NEGATIVE / RBC: < 5 /HPF / WBC < 5 /HPF   Sq Epi: x / Non Sq Epi: 0-5 /HPF / Bacteria: Present /HPF        MICROBIOLOGY:    Culture - Blood (collected 18 @ 16:02)  Source: .Blood Blood-Peripheral  Preliminary Report (18 @ 05:01):    No growth at 12 hours    Culture - Blood (collected 18 @ 16:02)  Source: .Blood Blood-Peripheral  Preliminary Report (18 @ 05:01):    No growth at 12 hours        RADIOLOGY & ADDITIONAL STUDIES: INFECTIOUS DISEASE SERVICE INITIAL CONSULTATION NOTE    HPI:  Patient is a 66 yo female pmh of DM2 on insulin, CKD, who presented with ulcer of right toe. Patient stated she noticed the ulcer 2 days ago, unclear the trigger, thought it was just ink marker, denied pain, no exudate, no open wound, never had ulcer in the past, denied fever/chill, no n/v. Been following up with Dr. Lee, who sent the patient here for IV antibiotics.  Patient was treated for osteo of left foot with cipro and clinda IV outpatient 2017, followed by Dr. Alvarado previously.  In the ED, patient given vancomycin, ESR and CRP elevated, Xray of foot showed no osteo. Evaluated by podiatry. (2018 16:49)      PAST MEDICAL & SURGICAL HISTORY:  CKD (chronic kidney disease)  IDDM (insulin dependent diabetes mellitus)  DM (diabetes mellitus)  HLD (hyperlipidemia)  HTN (hypertension)  No significant past surgical history      REVIEW OF SYSTEMS:  Constitutional: no weakness, no fevers, no chills  Dermatologic: no rash  Respiratory: no SOB, no cough  Cardiovascular: no chest pain, no palpitations  Gastrointestinal: no nausea, no vomiting, no diarrhea  Genitourinary: no dysuria, no urinary frequency, no hematuria, no urinary retention  Musculoskeletal:	+foot pain  Neurological: no focal weakness or numbness  Endocrine: no polyuria, no polydipsia    ACTIVE ANTIMICROBIAL/ANTIBIOTIC MEDICATIONS:  Cefepime, vancomycin    OTHER MEDICATIONS:  aspirin  chewable 81 milliGRAM(s) Oral daily  atorvastatin 20 milliGRAM(s) Oral at bedtime  dextrose 5%. 1000 milliLiter(s) IV Continuous <Continuous>  dextrose 50% Injectable 12.5 Gram(s) IV Push once  dextrose 50% Injectable 25 Gram(s) IV Push once  dextrose 50% Injectable 25 Gram(s) IV Push once  dextrose Gel 1 Dose(s) Oral once PRN  glucagon  Injectable 1 milliGRAM(s) IntraMuscular once PRN  insulin glargine Injectable (LANTUS) 25 Unit(s) SubCutaneous at bedtime  insulin lispro (HumaLOG) corrective regimen sliding scale   SubCutaneous Before meals and at bedtime  metoprolol succinate  milliGRAM(s) Oral daily  Pre Pen Benzylpenicilloyl Polylysine 1 Vial(s),Penicillin G Potassium (10,000 units/mL) 10 milliLiter(s),Histamine (1mg/ml) 0.5 milliLiter(s),0.9% Sodium Chloride vial 10 milliLiter(s) 1 Vial(s) IntraDermal once  tamsulosin 0.8 milliGRAM(s) Oral at bedtime      ALLERGIES:  Allergies    penicillin (Rash)  Penicillin Testing Negative (Unknown)    Intolerances        SOCIAL HISTORY:    FAMILY HISTORY:  No pertinent family history in first degree relatives      VITAL SIGNS:  ICU Vital Signs Last 24 Hrs  T(C): 36.9 (2018 12:00), Max: 37 (2018 23:56)  T(F): 98.4 (2018 12:00), Max: 98.6 (2018 23:56)  HR: 53 (2018 12:00) (53 - 62)  BP: 147/67 (2018 12:00) (127/55 - 172/81)  BP(mean): --  ABP: --  ABP(mean): --  RR: 17 (2018 12:00) (17 - 18)  SpO2: 97% (2018 12:00) (95% - 100%)      PHYSICAL EXAM:  Constitutional: WDWN  Head: NC/AT  Eyes: anicteric slcera  ENMT: no rhinorrhea; no sinus tenderness on palpation; no oropharyngeal lesions, erythema, or exudates	  Neck: supple; no JVD or LAD  Respiratory: CTA B/L  Cardiovascular: +S1/S2, RRR; no appreciable murmurs  Gastrointestinal: soft, NT/ND; +BSx4, no HSM  Extremities: Right foot with 2.5cm x 2.5cm wound with dark center and surrounding erythema  Vascular: 2+ radial, DP/PT pulses B/L  Neurologic: AAOx3; no focal deficits    LABS:                        10.2   8.4   )-----------( 258      ( 2018 05:50 )             30.6     02-    139  |  103  |  29<H>  ----------------------------<  103<H>  3.8   |  26  |  1.47<H>    Ca    9.4      2018 05:52    TPro  7.4  /  Alb  3.7  /  TBili  0.4  /  DBili  x   /  AST  21  /  ALT  15  /  AlkPhos  76  02-    PT/INR - ( 2018 14:45 )   PT: 11.6 sec;   INR: 1.04          PTT - ( 2018 14:45 )  PTT:32.0 sec  Urinalysis Basic - ( 2018 01:23 )    Color: Yellow / Appearance: Clear / S.020 / pH: x  Gluc: x / Ketone: NEGATIVE  / Bili: Negative / Urobili: 0.2 E.U./dL   Blood: x / Protein: Trace mg/dL / Nitrite: NEGATIVE   Leuk Esterase: NEGATIVE / RBC: < 5 /HPF / WBC < 5 /HPF   Sq Epi: x / Non Sq Epi: 0-5 /HPF / Bacteria: Present /HPF        MICROBIOLOGY:    Culture - Blood (collected 18 @ 16:02)  Source: .Blood Blood-Peripheral  Preliminary Report (18 @ 05:01):    No growth at 12 hours    Culture - Blood (collected 18 @ 16:02)  Source: .Blood Blood-Peripheral  Preliminary Report (18 @ 05:01):    No growth at 12 hours        RADIOLOGY & ADDITIONAL STUDIES:  < from: Xray Foot AP + Lateral + Oblique, Right (18 @ 15:24) >  EXAM:  XR FOOT-RIGHT MIN 3 VIEWS                          PROCEDURE DATE:  2018                     INTERPRETATION:  Indication: great toe ulcer, r/o osteo    3 views of the right foot demonstrate soft tissue swelling of the great   toe. There is lucency near the proximal phalanx. This may be secondary to   ulceration. No periosteal reaction or erosion is identified. There are   hypertrophic changes of the first MTP joint.      Impression: Soft tissue swelling and possible ulceration. No radiographic   evidence of osteomyelitis. If there is concern for underlying   osteomyelitis an MRI or bone scan should be considered    < end of copied text >

## 2018-02-01 NOTE — DISCHARGE NOTE ADULT - CARE PROVIDER_API CALL
Hernandez Bennett ([facility]), Podiatric Medicine and Surgery  [address] [place] [address]  Phone: [phone]  Fax: [phone] Hernandez Bennett ([facility]), Podiatric Medicine and Surgery  [address] [place] [address]  Phone: [phone]  Fax: [phone]    Montez Thurman), Medicine  [address] [place] [address]  Phone: [phone]  Fax: [phone]

## 2018-02-01 NOTE — DISCHARGE NOTE ADULT - CARE PLAN
Principal Discharge DX:	Toe ulcer due to DM  Goal:	To initiate treatment Principal Discharge DX:	Toe ulcer due to DM  Goal:	To initiate treatment  Assessment and plan of treatment:	You were admitted because of an ulcer and infection in your right foot. We were concerned that you might have an osteomyelitis of your foot, so you underwent a magnetic resonance imaging of your foot.  The MRI of your foot did not show any bone involvement, so you were ultimately diagnosed with a soft tissue infection called cellulitis. You received antibiotics for this. Please continue taking antibiotics for this and follow up with your podiatrist and PCP. Principal Discharge DX:	Toe ulcer due to DM  Goal:	To initiate treatment  Assessment and plan of treatment:	You were admitted because of an ulcer and infection in your right foot. We were concerned that you might have an osteomyelitis of your foot, so you underwent a magnetic resonance imaging of your foot.  The MRI of your foot did not show any bone involvement, so you were ultimately diagnosed with a soft tissue infection called cellulitis. You received antibiotics for this. Please continue taking antibiotics for this and follow up with a podiatrist and PCP.  Secondary Diagnosis:	DM (diabetes mellitus)  Goal:	well controlled blood sugar levels  Assessment and plan of treatment:	Your insulin requirements while at the hospital were different so we adjusted the insulin dosage accordingly. Please continue using your home dose of Lantus and follow up with your endocrinologist and PCP.  Secondary Diagnosis:	CKD (chronic kidney disease)  Goal:	no progression  Assessment and plan of treatment:	Please follow up with your PCP and nephrologist to determine if any modifications in your current regimen need to be made.

## 2018-02-01 NOTE — DISCHARGE NOTE ADULT - OTHER SIGNIFICANT FINDINGS
MR Foot No Cont, Right (02.02.18 @ 21:54)   FINDINGS: Evaluation of the soft tissues reveals a defect in the plantar   skin surface of thegreat toe near the IP joint. This extends proximally   along the medial skin surface toward the metatarsophalangeal joint.  No associated drainable fluid collection is identified. Skin defect does   not appear to extend to the underlying bone. Halluxsesamoids are   lateralized and the mildred is effaced. A small volume of fluid is present   in the plantar aspect of the interphalangeal joint. There are cystic   appearing changes in the plantar distal aspects of the first metatarsal   head and the base of the proximal phalanx. No periosteal reaction or   erosion is identified. T2 signal hyperintensity is present in the neck of   the first metatarsal with minimal T1 signal hypointensity. This is remote   from the skin ulceration and unlikely to represent acute osteomyelitis.   There is no evidence of acute fracture or dislocation.     No areas of erosion or aggressive-appearing bone destruction are   identified. There is dorsal subcutaneous edema throughout the mid and   forefoot. In the appropriate setting this would be compatible with   cellulitis.    The visualized flexor and extensor tendons are intact.    No evidence of Vela's neuroma or intermetatarsal bursitis.      IMPRESSION:  Superficial plantar medial skin ulceration from the base of the first   proximal phalanx to the IP joint without MR evidence of underlying   osteomyelitis.    Arthropathic changes at the first MTP joint. Edema like signal in the   neck of the first metatarsal may be related to arthritis. Osteomyelitis   in this region is considered unlikely.

## 2018-02-01 NOTE — DISCHARGE NOTE ADULT - MEDICATION SUMMARY - MEDICATIONS TO TAKE
I will START or STAY ON the medications listed below when I get home from the hospital:    aspirin 81 mg oral tablet, chewable  -- 1 tab(s) by mouth once a day  -- Indication: For Prophylaxis    terazosin 5 mg oral capsule  -- 1 cap(s) by mouth once a day  -- Indication: For Nutrition, metabolism, and development symptoms    insulin glargine 100 units/mL subcutaneous solution  -- 30 unit(s) subcutaneous once a day (at bedtime)  -- Do not drink alcoholic beverages when taking this medication.  It is very important that you take or use this exactly as directed.  Do not skip doses or discontinue unless directed by your doctor.  Keep in refrigerator.  Do not freeze.    -- Indication: For diabetes    atorvastatin 20 mg oral tablet  -- 1 tab(s) by mouth once a day  -- Indication: For CAD    Adoxa 100 mg oral tablet  -- 1 tab(s) by mouth every 12 hours   -- Avoid prolonged or excessive exposure to direct and/or artificial sunlight while taking this medication.  Do not take this drug if you are pregnant.  Finish all this medication unless otherwise directed by prescriber.  Medication should be taken with plenty of water.    -- Indication: For Toe ulcer due to DM    metoprolol succinate 100 mg oral tablet, extended release  -- 1 tab(s) by mouth once a day  -- Indication: For Hypertension    cefpodoxime 200 mg oral tablet  -- 2 tab(s) by mouth every 12 hours   -- Finish all this medication unless otherwise directed by prescriber.  Take with food or milk.    -- Indication: For Toe ulcer due to DM    Lasix 40 mg oral tablet  -- 1 tab(s) by mouth once a day  -- Indication: For CHF

## 2018-02-01 NOTE — PROGRESS NOTE ADULT - ASSESSMENT
67 year old female presents with right DM foot ulceration and associated cellulitis.   - Recommend continuing cefepime / renally dosed as patient appears to be responding well with remarkable improvement in the level of cellulitis from the previous encounter.    - DSD applied to wound site along with betadine to the wound margins.    - Patient may weightbear as tolerated in a surgical shoe.   - Podiatry to follow

## 2018-02-01 NOTE — CONSULT NOTE ADULT - ATTENDING COMMENTS
Agree with above.  Given the acuity and location, I am concerned for an acute osteomyelitis.  Her ESR and CRP are higher than they were at the resolution of her last osteomyelitis.  Foot xray is negative but this could be false negative in the first two weeks of infection.  Continue vancomycin 1 gram IV daily, Cefepime 2 grams IV daily, flagyl 500 mg PO q8hrs.  Check MRI foot to eval for osteomyelitis    Will follow

## 2018-02-01 NOTE — DISCHARGE NOTE ADULT - PLAN OF CARE
To initiate treatment You were admitted because of an ulcer and infection in your right foot. We were concerned that you might have an osteomyelitis of your foot, so you underwent a magnetic resonance imaging of your foot.  The MRI of your foot did not show any bone involvement, so you were ultimately diagnosed with a soft tissue infection called cellulitis. You received antibiotics for this. Please continue taking antibiotics for this and follow up with your podiatrist and PCP. You were admitted because of an ulcer and infection in your right foot. We were concerned that you might have an osteomyelitis of your foot, so you underwent a magnetic resonance imaging of your foot.  The MRI of your foot did not show any bone involvement, so you were ultimately diagnosed with a soft tissue infection called cellulitis. You received antibiotics for this. Please continue taking antibiotics for this and follow up with a podiatrist and PCP. well controlled blood sugar levels Your insulin requirements while at the hospital were different so we adjusted the insulin dosage accordingly. Please continue using your home dose of Lantus and follow up with your endocrinologist and PCP. no progression Please follow up with your PCP and nephrologist to determine if any modifications in your current regimen need to be made.

## 2018-02-02 DIAGNOSIS — N18.2 CHRONIC KIDNEY DISEASE, STAGE 2 (MILD): ICD-10-CM

## 2018-02-02 LAB
% ALBUMIN: 51.5 % — SIGNIFICANT CHANGE UP
% ALPHA 1: 4.9 % — SIGNIFICANT CHANGE UP
% ALPHA 2: 13.5 % — SIGNIFICANT CHANGE UP
% BETA: 13.2 % — SIGNIFICANT CHANGE UP
% GAMMA: 16.9 % — SIGNIFICANT CHANGE UP
ALBUMIN SERPL ELPH-MCNC: 3.5 G/DL — LOW (ref 3.6–5.5)
ALBUMIN/GLOB SERPL ELPH: 1.1 RATIO — SIGNIFICANT CHANGE UP
ALPHA1 GLOB SERPL ELPH-MCNC: 0.3 G/DL — SIGNIFICANT CHANGE UP (ref 0.1–0.4)
ALPHA2 GLOB SERPL ELPH-MCNC: 0.9 G/DL — SIGNIFICANT CHANGE UP (ref 0.5–1)
ANION GAP SERPL CALC-SCNC: 13 MMOL/L — SIGNIFICANT CHANGE UP (ref 5–17)
B-GLOBULIN SERPL ELPH-MCNC: 0.9 G/DL — SIGNIFICANT CHANGE UP (ref 0.5–1)
BUN SERPL-MCNC: 30 MG/DL — HIGH (ref 7–23)
CALCIUM SERPL-MCNC: 9.2 MG/DL — SIGNIFICANT CHANGE UP (ref 8.4–10.5)
CHLORIDE SERPL-SCNC: 102 MMOL/L — SIGNIFICANT CHANGE UP (ref 96–108)
CO2 SERPL-SCNC: 24 MMOL/L — SIGNIFICANT CHANGE UP (ref 22–31)
CREAT SERPL-MCNC: 1.62 MG/DL — HIGH (ref 0.5–1.3)
GAMMA GLOBULIN: 1.1 G/DL — SIGNIFICANT CHANGE UP (ref 0.6–1.6)
GLUCOSE BLDC GLUCOMTR-MCNC: 141 MG/DL — HIGH (ref 70–99)
GLUCOSE BLDC GLUCOMTR-MCNC: 145 MG/DL — HIGH (ref 70–99)
GLUCOSE BLDC GLUCOMTR-MCNC: 194 MG/DL — HIGH (ref 70–99)
GLUCOSE BLDC GLUCOMTR-MCNC: 215 MG/DL — HIGH (ref 70–99)
GLUCOSE SERPL-MCNC: 169 MG/DL — HIGH (ref 70–99)
HCT VFR BLD CALC: 30.6 % — LOW (ref 34.5–45)
HGB BLD-MCNC: 10.4 G/DL — LOW (ref 11.5–15.5)
INTERPRETATION SERPL IFE-IMP: SIGNIFICANT CHANGE UP
KAPPA LC SER QL IFE: 3.61 MG/DL — HIGH (ref 0.33–1.94)
KAPPA/LAMBDA FREE LIGHT CHAIN RATIO, SERUM: 1.35 RATIO — SIGNIFICANT CHANGE UP (ref 0.26–1.65)
LAMBDA LC SER QL IFE: 2.67 MG/DL — HIGH (ref 0.57–2.63)
MAGNESIUM SERPL-MCNC: 2.2 MG/DL — SIGNIFICANT CHANGE UP (ref 1.6–2.6)
MCHC RBC-ENTMCNC: 31.6 PG — SIGNIFICANT CHANGE UP (ref 27–34)
MCHC RBC-ENTMCNC: 34 G/DL — SIGNIFICANT CHANGE UP (ref 32–36)
MCV RBC AUTO: 93 FL — SIGNIFICANT CHANGE UP (ref 80–100)
PLATELET # BLD AUTO: 253 K/UL — SIGNIFICANT CHANGE UP (ref 150–400)
POTASSIUM SERPL-MCNC: 4 MMOL/L — SIGNIFICANT CHANGE UP (ref 3.5–5.3)
POTASSIUM SERPL-SCNC: 4 MMOL/L — SIGNIFICANT CHANGE UP (ref 3.5–5.3)
PROT PATTERN SERPL ELPH-IMP: SIGNIFICANT CHANGE UP
RBC # BLD: 3.29 M/UL — LOW (ref 3.8–5.2)
RBC # FLD: 12.2 % — SIGNIFICANT CHANGE UP (ref 10.3–16.9)
SODIUM SERPL-SCNC: 139 MMOL/L — SIGNIFICANT CHANGE UP (ref 135–145)
WBC # BLD: 9 K/UL — SIGNIFICANT CHANGE UP (ref 3.8–10.5)
WBC # FLD AUTO: 9 K/UL — SIGNIFICANT CHANGE UP (ref 3.8–10.5)

## 2018-02-02 PROCEDURE — 99232 SBSQ HOSP IP/OBS MODERATE 35: CPT | Mod: GC

## 2018-02-02 PROCEDURE — 99232 SBSQ HOSP IP/OBS MODERATE 35: CPT

## 2018-02-02 PROCEDURE — 73718 MRI LOWER EXTREMITY W/O DYE: CPT | Mod: 26,RT

## 2018-02-02 RX ORDER — HYDRALAZINE HCL 50 MG
10 TABLET ORAL ONCE
Qty: 0 | Refills: 0 | Status: COMPLETED | OUTPATIENT
Start: 2018-02-02 | End: 2018-02-02

## 2018-02-02 RX ORDER — METOPROLOL TARTRATE 50 MG
100 TABLET ORAL DAILY
Qty: 0 | Refills: 0 | Status: DISCONTINUED | OUTPATIENT
Start: 2018-02-02 | End: 2018-02-05

## 2018-02-02 RX ADMIN — Medication 500 MILLIGRAM(S): at 13:01

## 2018-02-02 RX ADMIN — Medication 2: at 18:20

## 2018-02-02 RX ADMIN — Medication 81 MILLIGRAM(S): at 11:32

## 2018-02-02 RX ADMIN — CEFEPIME 2000 MILLIGRAM(S): 1 INJECTION, POWDER, FOR SOLUTION INTRAMUSCULAR; INTRAVENOUS at 19:18

## 2018-02-02 RX ADMIN — TAMSULOSIN HYDROCHLORIDE 0.8 MILLIGRAM(S): 0.4 CAPSULE ORAL at 21:02

## 2018-02-02 RX ADMIN — Medication 250 MILLIGRAM(S): at 18:21

## 2018-02-02 RX ADMIN — Medication 4: at 23:01

## 2018-02-02 RX ADMIN — Medication 500 MILLIGRAM(S): at 21:02

## 2018-02-02 RX ADMIN — INSULIN GLARGINE 25 UNIT(S): 100 INJECTION, SOLUTION SUBCUTANEOUS at 23:01

## 2018-02-02 RX ADMIN — Medication 10 MILLIGRAM(S): at 00:51

## 2018-02-02 RX ADMIN — Medication 500 MILLIGRAM(S): at 06:49

## 2018-02-02 RX ADMIN — ATORVASTATIN CALCIUM 20 MILLIGRAM(S): 80 TABLET, FILM COATED ORAL at 21:02

## 2018-02-02 NOTE — PROGRESS NOTE ADULT - ASSESSMENT
A/P: 67 year old DMT2 female presents with right plantar forefoot ulceration.   - Continue current antibiotic regimen as patient is responding well.   - Patient scheduled for a MRI.  Will followup results   - DSD applied to the right lower extremity.   - Podiatry to follow.    - Dispo planning as per primary team.

## 2018-02-02 NOTE — PROGRESS NOTE ADULT - ASSESSMENT
67F PMHx of DM2, CKD, who presented with ulcer of the right toe that evolved over 6 days. Based on the size and location of the wound, as well as the rapid development, there is concern for osteomyelitis.   Recommend  MRI foot to r/o osteomyelitis  Continue vancomycin, please obtain a trough before the 4th dose  Continue cefepime 2g q24  Continue Flagyl 500 q8

## 2018-02-03 LAB
ANION GAP SERPL CALC-SCNC: 12 MMOL/L — SIGNIFICANT CHANGE UP (ref 5–17)
BUN SERPL-MCNC: 27 MG/DL — HIGH (ref 7–23)
CALCIUM SERPL-MCNC: 9.2 MG/DL — SIGNIFICANT CHANGE UP (ref 8.4–10.5)
CHLORIDE SERPL-SCNC: 101 MMOL/L — SIGNIFICANT CHANGE UP (ref 96–108)
CO2 SERPL-SCNC: 25 MMOL/L — SIGNIFICANT CHANGE UP (ref 22–31)
CREAT SERPL-MCNC: 1.49 MG/DL — HIGH (ref 0.5–1.3)
GLUCOSE BLDC GLUCOMTR-MCNC: 183 MG/DL — HIGH (ref 70–99)
GLUCOSE BLDC GLUCOMTR-MCNC: 191 MG/DL — HIGH (ref 70–99)
GLUCOSE BLDC GLUCOMTR-MCNC: 276 MG/DL — HIGH (ref 70–99)
GLUCOSE BLDC GLUCOMTR-MCNC: 295 MG/DL — HIGH (ref 70–99)
GLUCOSE SERPL-MCNC: 209 MG/DL — HIGH (ref 70–99)
MAGNESIUM SERPL-MCNC: 2.1 MG/DL — SIGNIFICANT CHANGE UP (ref 1.6–2.6)
POTASSIUM SERPL-MCNC: 4.4 MMOL/L — SIGNIFICANT CHANGE UP (ref 3.5–5.3)
POTASSIUM SERPL-SCNC: 4.4 MMOL/L — SIGNIFICANT CHANGE UP (ref 3.5–5.3)
SODIUM SERPL-SCNC: 138 MMOL/L — SIGNIFICANT CHANGE UP (ref 135–145)
VANCOMYCIN TROUGH SERPL-MCNC: 14 UG/ML — SIGNIFICANT CHANGE UP (ref 10–20)

## 2018-02-03 PROCEDURE — 99232 SBSQ HOSP IP/OBS MODERATE 35: CPT

## 2018-02-03 RX ORDER — VANCOMYCIN HCL 1 G
1000 VIAL (EA) INTRAVENOUS EVERY 24 HOURS
Qty: 0 | Refills: 0 | Status: DISCONTINUED | OUTPATIENT
Start: 2018-02-03 | End: 2018-02-05

## 2018-02-03 RX ORDER — VANCOMYCIN HCL 1 G
1000 VIAL (EA) INTRAVENOUS ONCE
Qty: 0 | Refills: 0 | Status: DISCONTINUED | OUTPATIENT
Start: 2018-02-03 | End: 2018-02-03

## 2018-02-03 RX ADMIN — TAMSULOSIN HYDROCHLORIDE 0.8 MILLIGRAM(S): 0.4 CAPSULE ORAL at 21:35

## 2018-02-03 RX ADMIN — Medication 500 MILLIGRAM(S): at 05:21

## 2018-02-03 RX ADMIN — Medication 6: at 21:35

## 2018-02-03 RX ADMIN — Medication 2: at 09:08

## 2018-02-03 RX ADMIN — Medication 6: at 12:29

## 2018-02-03 RX ADMIN — Medication 500 MILLIGRAM(S): at 21:35

## 2018-02-03 RX ADMIN — Medication 250 MILLIGRAM(S): at 18:13

## 2018-02-03 RX ADMIN — Medication 2: at 18:13

## 2018-02-03 RX ADMIN — Medication 500 MILLIGRAM(S): at 13:01

## 2018-02-03 RX ADMIN — CEFEPIME 2000 MILLIGRAM(S): 1 INJECTION, POWDER, FOR SOLUTION INTRAMUSCULAR; INTRAVENOUS at 17:39

## 2018-02-03 RX ADMIN — Medication 81 MILLIGRAM(S): at 13:01

## 2018-02-03 RX ADMIN — INSULIN GLARGINE 25 UNIT(S): 100 INJECTION, SOLUTION SUBCUTANEOUS at 21:34

## 2018-02-03 RX ADMIN — ATORVASTATIN CALCIUM 20 MILLIGRAM(S): 80 TABLET, FILM COATED ORAL at 21:35

## 2018-02-03 NOTE — PROGRESS NOTE ADULT - ASSESSMENT
IMPRESSION:  Diabetic foot infection. Likely cellulitis r/o osteomyelitis.  She is clinically improving    Recommend:  1.  Can continue vancomycin, cefepime and flagyl for now  2.  Follow up MRI to eval for osteomyelitis  3.  If there is acute osteomyelitis, 6 weeks of IV antibiotics indicated.  If no osteomyelitis she can likely be treated for cellulitis with oral antibiotics (doxycycline + cefpodoxime for 7 days is a reasonable choice)

## 2018-02-03 NOTE — PROGRESS NOTE ADULT - ASSESSMENT
A/P: 67 year old DMT2 female presents with right plantar forefoot ulceration.   - f/u ID recs   - PMRI done .  Will followup results   - DSD applied to the right lower extremity.   - Podiatry to follow.    - Dispo planning as per primary team.

## 2018-02-04 LAB
ANION GAP SERPL CALC-SCNC: 14 MMOL/L — SIGNIFICANT CHANGE UP (ref 5–17)
BUN SERPL-MCNC: 22 MG/DL — SIGNIFICANT CHANGE UP (ref 7–23)
CALCIUM SERPL-MCNC: 9.6 MG/DL — SIGNIFICANT CHANGE UP (ref 8.4–10.5)
CHLORIDE SERPL-SCNC: 102 MMOL/L — SIGNIFICANT CHANGE UP (ref 96–108)
CO2 SERPL-SCNC: 22 MMOL/L — SIGNIFICANT CHANGE UP (ref 22–31)
CREAT SERPL-MCNC: 1.4 MG/DL — HIGH (ref 0.5–1.3)
GLUCOSE BLDC GLUCOMTR-MCNC: 107 MG/DL — HIGH (ref 70–99)
GLUCOSE BLDC GLUCOMTR-MCNC: 177 MG/DL — HIGH (ref 70–99)
GLUCOSE BLDC GLUCOMTR-MCNC: 217 MG/DL — HIGH (ref 70–99)
GLUCOSE BLDC GLUCOMTR-MCNC: 254 MG/DL — HIGH (ref 70–99)
GLUCOSE SERPL-MCNC: 235 MG/DL — HIGH (ref 70–99)
HCT VFR BLD CALC: 32 % — LOW (ref 34.5–45)
HGB BLD-MCNC: 10.9 G/DL — LOW (ref 11.5–15.5)
MAGNESIUM SERPL-MCNC: 2.2 MG/DL — SIGNIFICANT CHANGE UP (ref 1.6–2.6)
MCHC RBC-ENTMCNC: 31.3 PG — SIGNIFICANT CHANGE UP (ref 27–34)
MCHC RBC-ENTMCNC: 34.1 G/DL — SIGNIFICANT CHANGE UP (ref 32–36)
MCV RBC AUTO: 92 FL — SIGNIFICANT CHANGE UP (ref 80–100)
PLATELET # BLD AUTO: 263 K/UL — SIGNIFICANT CHANGE UP (ref 150–400)
POTASSIUM SERPL-MCNC: 4.7 MMOL/L — SIGNIFICANT CHANGE UP (ref 3.5–5.3)
POTASSIUM SERPL-SCNC: 4.7 MMOL/L — SIGNIFICANT CHANGE UP (ref 3.5–5.3)
PROT ?TM UR-MCNC: 23 MG/DL — HIGH (ref 0–12)
RBC # BLD: 3.48 M/UL — LOW (ref 3.8–5.2)
RBC # FLD: 12.8 % — SIGNIFICANT CHANGE UP (ref 10.3–16.9)
SODIUM SERPL-SCNC: 138 MMOL/L — SIGNIFICANT CHANGE UP (ref 135–145)
WBC # BLD: 9.2 K/UL — SIGNIFICANT CHANGE UP (ref 3.8–10.5)
WBC # FLD AUTO: 9.2 K/UL — SIGNIFICANT CHANGE UP (ref 3.8–10.5)

## 2018-02-04 PROCEDURE — 99232 SBSQ HOSP IP/OBS MODERATE 35: CPT

## 2018-02-04 RX ORDER — INSULIN GLARGINE 100 [IU]/ML
35 INJECTION, SOLUTION SUBCUTANEOUS AT BEDTIME
Qty: 0 | Refills: 0 | Status: DISCONTINUED | OUTPATIENT
Start: 2018-02-04 | End: 2018-02-05

## 2018-02-04 RX ORDER — INSULIN LISPRO 100/ML
3 VIAL (ML) SUBCUTANEOUS
Qty: 0 | Refills: 0 | Status: DISCONTINUED | OUTPATIENT
Start: 2018-02-04 | End: 2018-02-05

## 2018-02-04 RX ADMIN — Medication 4: at 09:11

## 2018-02-04 RX ADMIN — Medication 500 MILLIGRAM(S): at 14:20

## 2018-02-04 RX ADMIN — Medication 100 MILLIGRAM(S): at 05:43

## 2018-02-04 RX ADMIN — Medication 500 MILLIGRAM(S): at 05:43

## 2018-02-04 RX ADMIN — TAMSULOSIN HYDROCHLORIDE 0.8 MILLIGRAM(S): 0.4 CAPSULE ORAL at 21:08

## 2018-02-04 RX ADMIN — ATORVASTATIN CALCIUM 20 MILLIGRAM(S): 80 TABLET, FILM COATED ORAL at 21:08

## 2018-02-04 RX ADMIN — Medication 2: at 14:20

## 2018-02-04 RX ADMIN — Medication 6: at 21:07

## 2018-02-04 RX ADMIN — CEFEPIME 2000 MILLIGRAM(S): 1 INJECTION, POWDER, FOR SOLUTION INTRAMUSCULAR; INTRAVENOUS at 17:33

## 2018-02-04 RX ADMIN — Medication 81 MILLIGRAM(S): at 14:20

## 2018-02-04 RX ADMIN — INSULIN GLARGINE 35 UNIT(S): 100 INJECTION, SOLUTION SUBCUTANEOUS at 21:07

## 2018-02-04 RX ADMIN — Medication 250 MILLIGRAM(S): at 18:27

## 2018-02-04 RX ADMIN — Medication 500 MILLIGRAM(S): at 21:08

## 2018-02-04 NOTE — PROGRESS NOTE ADULT - ASSESSMENT
Patient is a 68 yo female pmh of DM2 on insulin, CKD, who presented with ulcer of right toe.  awaiting results of MRI, continue antibiotics

## 2018-02-05 VITALS
RESPIRATION RATE: 18 BRPM | HEART RATE: 57 BPM | SYSTOLIC BLOOD PRESSURE: 152 MMHG | OXYGEN SATURATION: 98 % | DIASTOLIC BLOOD PRESSURE: 74 MMHG | TEMPERATURE: 98 F

## 2018-02-05 LAB
% GAMMA, URINE: 30.2 % — SIGNIFICANT CHANGE UP
ALBUMIN 24H MFR UR ELPH: 29.6 % — SIGNIFICANT CHANGE UP
ALPHA1 GLOB 24H MFR UR ELPH: 11.1 % — SIGNIFICANT CHANGE UP
ALPHA2 GLOB 24H MFR UR ELPH: 14 % — SIGNIFICANT CHANGE UP
ANION GAP SERPL CALC-SCNC: 12 MMOL/L — SIGNIFICANT CHANGE UP (ref 5–17)
B-GLOBULIN 24H MFR UR ELPH: 15.1 % — SIGNIFICANT CHANGE UP
BUN SERPL-MCNC: 23 MG/DL — SIGNIFICANT CHANGE UP (ref 7–23)
CALCIUM SERPL-MCNC: 9.4 MG/DL — SIGNIFICANT CHANGE UP (ref 8.4–10.5)
CHLORIDE SERPL-SCNC: 102 MMOL/L — SIGNIFICANT CHANGE UP (ref 96–108)
CO2 SERPL-SCNC: 22 MMOL/L — SIGNIFICANT CHANGE UP (ref 22–31)
CREAT SERPL-MCNC: 1.41 MG/DL — HIGH (ref 0.5–1.3)
CULTURE RESULTS: SIGNIFICANT CHANGE UP
CULTURE RESULTS: SIGNIFICANT CHANGE UP
GLUCOSE BLDC GLUCOMTR-MCNC: 135 MG/DL — HIGH (ref 70–99)
GLUCOSE BLDC GLUCOMTR-MCNC: 153 MG/DL — HIGH (ref 70–99)
GLUCOSE SERPL-MCNC: 163 MG/DL — HIGH (ref 70–99)
INTERPRETATION 24H UR IFE-IMP: SIGNIFICANT CHANGE UP
M PROTEIN 24H UR ELPH-MRATE: SIGNIFICANT CHANGE UP
MAGNESIUM SERPL-MCNC: 2.2 MG/DL — SIGNIFICANT CHANGE UP (ref 1.6–2.6)
POTASSIUM SERPL-MCNC: 4.5 MMOL/L — SIGNIFICANT CHANGE UP (ref 3.5–5.3)
POTASSIUM SERPL-SCNC: 4.5 MMOL/L — SIGNIFICANT CHANGE UP (ref 3.5–5.3)
PROT ?TM UR-MCNC: 23 MG/DL — HIGH (ref 0–12)
PROT PATTERN 24H UR ELPH-IMP: SIGNIFICANT CHANGE UP
PTH RELATED PROT SERPL-MCNC: <1.1 PMOL/L — SIGNIFICANT CHANGE UP
SODIUM SERPL-SCNC: 136 MMOL/L — SIGNIFICANT CHANGE UP (ref 135–145)
SPECIMEN SOURCE: SIGNIFICANT CHANGE UP
SPECIMEN SOURCE: SIGNIFICANT CHANGE UP

## 2018-02-05 PROCEDURE — 83036 HEMOGLOBIN GLYCOSYLATED A1C: CPT

## 2018-02-05 PROCEDURE — 85027 COMPLETE CBC AUTOMATED: CPT

## 2018-02-05 PROCEDURE — 86140 C-REACTIVE PROTEIN: CPT

## 2018-02-05 PROCEDURE — 73718 MRI LOWER EXTREMITY W/O DYE: CPT

## 2018-02-05 PROCEDURE — 83519 RIA NONANTIBODY: CPT

## 2018-02-05 PROCEDURE — 84443 ASSAY THYROID STIM HORMONE: CPT

## 2018-02-05 PROCEDURE — 96374 THER/PROPH/DIAG INJ IV PUSH: CPT

## 2018-02-05 PROCEDURE — 83970 ASSAY OF PARATHORMONE: CPT

## 2018-02-05 PROCEDURE — 83521 IG LIGHT CHAINS FREE EACH: CPT

## 2018-02-05 PROCEDURE — 80053 COMPREHEN METABOLIC PANEL: CPT

## 2018-02-05 PROCEDURE — 85610 PROTHROMBIN TIME: CPT

## 2018-02-05 PROCEDURE — 99232 SBSQ HOSP IP/OBS MODERATE 35: CPT | Mod: GC

## 2018-02-05 PROCEDURE — 82310 ASSAY OF CALCIUM: CPT

## 2018-02-05 PROCEDURE — 86334 IMMUNOFIX E-PHORESIS SERUM: CPT

## 2018-02-05 PROCEDURE — 85652 RBC SED RATE AUTOMATED: CPT

## 2018-02-05 PROCEDURE — 83735 ASSAY OF MAGNESIUM: CPT

## 2018-02-05 PROCEDURE — 81001 URINALYSIS AUTO W/SCOPE: CPT

## 2018-02-05 PROCEDURE — 84155 ASSAY OF PROTEIN SERUM: CPT

## 2018-02-05 PROCEDURE — 84165 PROTEIN E-PHORESIS SERUM: CPT

## 2018-02-05 PROCEDURE — 82962 GLUCOSE BLOOD TEST: CPT

## 2018-02-05 PROCEDURE — 99285 EMERGENCY DEPT VISIT HI MDM: CPT | Mod: 25

## 2018-02-05 PROCEDURE — 82306 VITAMIN D 25 HYDROXY: CPT

## 2018-02-05 PROCEDURE — 80202 ASSAY OF VANCOMYCIN: CPT

## 2018-02-05 PROCEDURE — 36415 COLL VENOUS BLD VENIPUNCTURE: CPT

## 2018-02-05 PROCEDURE — 85025 COMPLETE CBC W/AUTO DIFF WBC: CPT

## 2018-02-05 PROCEDURE — 73630 X-RAY EXAM OF FOOT: CPT

## 2018-02-05 PROCEDURE — 80048 BASIC METABOLIC PNL TOTAL CA: CPT

## 2018-02-05 PROCEDURE — 87040 BLOOD CULTURE FOR BACTERIA: CPT

## 2018-02-05 PROCEDURE — 99232 SBSQ HOSP IP/OBS MODERATE 35: CPT

## 2018-02-05 PROCEDURE — 85730 THROMBOPLASTIN TIME PARTIAL: CPT

## 2018-02-05 RX ORDER — CEFPODOXIME PROXETIL 100 MG
2 TABLET ORAL
Qty: 20 | Refills: 0
Start: 2018-02-05 | End: 2018-02-09

## 2018-02-05 RX ADMIN — Medication 500 MILLIGRAM(S): at 05:33

## 2018-02-05 RX ADMIN — Medication 3 UNIT(S): at 12:04

## 2018-02-05 RX ADMIN — Medication 81 MILLIGRAM(S): at 12:04

## 2018-02-05 RX ADMIN — Medication 3 UNIT(S): at 08:54

## 2018-02-05 RX ADMIN — Medication 2: at 08:54

## 2018-02-05 RX ADMIN — Medication 500 MILLIGRAM(S): at 13:16

## 2018-02-05 NOTE — PROGRESS NOTE ADULT - ATTENDING COMMENTS
Agree with above.  Follow up MRI.  If there is osteomyelitis, IV antibiotics for 6 weeks is recommended.  If no evidence of osteomyelitis, can treat as skin/soft tissue infection for 7-10 days with oral antibiotics
Agree with above.  Patient much improved clinically.  MRI without evidence of osteomyelitis.  Can treat as a cellulitis.  Can stop Vanco, Cefepime and flagyl.  Can discharge with Doxycycline 100 mg PO q12 + Cefpodoxime 400 mg PO q12 x 5 more days    Will sign off

## 2018-02-05 NOTE — PROGRESS NOTE ADULT - ASSESSMENT
A/P: 67 year old DMT2 female presents with cellulitis of RLE 2/2 Cardenas grade 1 ulcer of right hallux     -C/w IV abx, as per ID, wound clinically improved, no leukocytosis    -F/u MRI final read, preliminary read with no osteomyelitis, likely discharge on PO abx with outpatient follow up  -Application of DSD to RLE and LLE  -Pt to follow up with Dr. Bennett one week from discharge

## 2018-02-05 NOTE — PROGRESS NOTE ADULT - PROBLEM SELECTOR PLAN 1
continue Lipitor
continue Lipitor
Appeared well healed, no exudate or discharge, no pain, Xray showed no bone involvement, ESR is elevated however not near osteo range. Likely mild-moderate foot ulcer, no systemic involvement.   -Patient improving on Cefepime, will dose renally q24h based on CrCl 28, once CrCl greater than 30 can do q12h dosing.   -Vanc 1g q12h  -Flagyl 500mg q8h  -Foot MR - official read on Monday, prelim read - no OM  -ID also following
Appeared well healed, no exudate or discharge, no pain, Xray showed no bone involvement, ESR is elevated however not near osteo range. Likely mild-moderate foot ulcer, no systemic involvement.   -Patient improving on Cefepime, will dose renally q24h based on CrCl 28, once CrCl greater than 30 can do q12h dosing.   -Vanc 1g q12h  -Flagyl 500mg q8h  -Foot MR to r/o osteo  -Spoke with Podiatry in AM: no indication for bone biopsy from their stand point. Ok with Foot MR at this time.   -ID also following
Appeared well healed, no exudate or discharge, no pain, Xray showed no bone involvement, ESR is elevated however not near osteo range. Likely mild-moderate foot ulcer, no systemic involvement.   -Patient improving on Cefepime, will dose renally q24h based on CrCl 28, once CrCl greater than 30 can do q12h dosing.   -Will f/u with podiatry for recs, would deescalate abx pending blood culture, wound is clean so doubt culture can be obtained.
Will adjust the abx after final MRI read per ID recs  Appeared well healed, no exudate or discharge, no pain, Xray showed no bone involvement, ESR is elevated however not near osteo range. Likely mild-moderate foot ulcer, no systemic involvement.   -Patient improving on Cefepime, will dose renally q24h based on CrCl 28, once CrCl greater than 30 can do q12h dosing.   -Vanc 1g q12h  -Flagyl 500mg q8h  -Foot MR - official read on Monday, prelim read - no OM  -ID also following

## 2018-02-05 NOTE — PROGRESS NOTE ADULT - PROBLEM SELECTOR PROBLEM 5
Hypercalcemia
IDDM (insulin dependent diabetes mellitus)
IDDM (insulin dependent diabetes mellitus)
Hypercalcemia

## 2018-02-05 NOTE — PROGRESS NOTE ADULT - PROBLEM SELECTOR PROBLEM 2
Essential hypertension
Essential hypertension
CKD (chronic kidney disease)

## 2018-02-05 NOTE — PROGRESS NOTE ADULT - PROBLEM SELECTOR PROBLEM 1
Pure hypercholesterolemia
Pure hypercholesterolemia
Toe ulcer due to DM

## 2018-02-05 NOTE — PROGRESS NOTE ADULT - PROBLEM SELECTOR PLAN 5
On admission, most likely secondary to dehydration, now wnl. Protein gap no longer present.   - vit D (27.4), PTH, PTHrP.
Followed by Dr Henderson.  Pt reports no prior screening for CAD
Followed by Dr Henderson.  Pt reports no prior screening for CAD
On admission, most likely secondary to dehydration, now wnl. Protein gap no longer present.   -follow up with vit D, PTH, PTHrP.

## 2018-02-05 NOTE — PROGRESS NOTE ADULT - PROBLEM SELECTOR PROBLEM 4
Toe ulcer due to DM
Toe ulcer due to DM
IDDM (insulin dependent diabetes mellitus)

## 2018-02-05 NOTE — PROGRESS NOTE ADULT - PROBLEM SELECTOR PLAN 6
F: No fluids currently  E: Replete PRN  N: Consistent Carbohydrates

## 2018-02-05 NOTE — PROGRESS NOTE ADULT - PROBLEM SELECTOR PLAN 7
P: Low risk (Improve 1), no pharmacologic DVT, encourage ambulation.  C: FULL CODE  D: RMF pending official MRI read
P: Low risk (Improve 1), no pharmacologic DVT, encourage ambulation.  C: FULL CODE  D: Continue on RMF under Dr. Thurman pending ID evaluation; expected discharge 24-48h pending blood cultures and Podiatry
P: Low risk (Improve 1), no pharmacologic DVT, encourage ambulation.  C: FULL CODE  D: Continue on RMF under Dr. Thurman pending ID evaluation; expected discharge 24-48h pending blood cultures and Podiatry
P: Low risk (Improve 1), no pharmacologic DVT, encourage ambulation.  C: FULL CODE  D: RMF pending official MRI read

## 2018-02-05 NOTE — PROGRESS NOTE ADULT - ASSESSMENT
MRI no evidence of osteomyelitis,  Discharge per antibiotics of ID choice and follow up with podiatry and has appointment with endocrine this week. advised to increase insulin

## 2018-02-05 NOTE — PROGRESS NOTE ADULT - PROVIDER SPECIALTY LIST ADULT
Cardiology
Infectious Disease
Internal Medicine
Podiatry
Internal Medicine

## 2018-02-05 NOTE — PROGRESS NOTE ADULT - PROBLEM SELECTOR PLAN 2
on Metoprolol. Not on ACE I but Cr rising
on Metoprolol. Not on ACE I.  Cr 1.41
Outpatient lab 1.3-1.9, patient was on dialysis in the past.   -Patient was started on lasix, metoprolol and terazosin by dr. Mayorga, unclear why. Would continue for now.

## 2018-02-05 NOTE — PROGRESS NOTE ADULT - ASSESSMENT
67F PMHx of DM2, CKD, who presented with ulcer of the right toe that evolved over 6 days. Osteomyelitis ruled out with MRI.  Recommend  Continue vancomycin, please obtain a trough before the 4th dose  Continue cefepime 2g q24  Continue Flagyl 500 q8  For discharge, patient may go home on doxycycline and cefpodoxime for 7 days.  Patient should follow up with Dr Alvarado in her office 67F PMHx of DM2, CKD, who presented with ulcer of the right toe that evolved over 6 days. Osteomyelitis ruled out with MRI.  Recommend  Discontinue vancomycin   Discontinue cefepime 2g q24  Discontinue Flagyl 500 q8  For discharge, patient may go home on doxycycline and cefpodoxime for 5 more days.  Patient should follow up with Dr Alvarado in her office

## 2018-02-05 NOTE — PROGRESS NOTE ADULT - PROBLEM SELECTOR PLAN 4
As per Podiatry
As per Podiatry
DM2, insulin dependent, with complication with foot ulceration  -Lantus 25U, will increase basal if needed  -MISS
DM2, insulin dependent, with complication with foot ulceration  -Lantus 35U, also added pre-meal coverage 3U tid  -MISS

## 2018-02-05 NOTE — PROGRESS NOTE ADULT - PROBLEM SELECTOR PROBLEM 3
Stage 2 chronic kidney disease
Stage 2 chronic kidney disease
Metabolic alkalosis

## 2018-02-05 NOTE — PROGRESS NOTE ADULT - PROBLEM SELECTOR PLAN 3
Due to DM
Due to DM
Elevated bicarb, likely 2/2 to contraction alkylosis 2/2 to lasix.
improving  Elevated bicarb, likely 2/2 to contraction alkylosis 2/2 to lasix.

## 2018-02-05 NOTE — PROGRESS NOTE ADULT - SUBJECTIVE AND OBJECTIVE BOX
Patient is a 68 yo female pmh of DM2 on insulin, CKD, who presented with ulcer of right toe. Patient stated she noticed the ulcer 2 days ago, unclear the trigger, thought it was just ink marker, denied pain, no exudate, no open wound, never had ulcer in the past, denied fever/chill, no n/v. Been following up with Dr. Lee, who sent the patient here for IV antibiotics.  Patient was treated for osteo of left foot with cipro and clinda IV outpatient July 2017, followed by Dr. Alvarado previously.  In the ED, patient given vancomycin, ESR and CRP elevated, Xray of foot showed no osteo. Evaluated by podiatry.    Past Medical History:  CKD (chronic kidney disease)    DM (diabetes mellitus)    HLD (hyperlipidemia)    HTN (hypertension)    IDDM (insulin dependent diabetes mellitus).    	  MEDICATIONS:  metoprolol succinate  milliGRAM(s) Oral daily  tamsulosin 0.8 milliGRAM(s) Oral at bedtime    cefepime Injectable. 2000 milliGRAM(s) IV Push every 24 hours  metroNIDAZOLE    Tablet 500 milliGRAM(s) Oral every 8 hours  vancomycin  IVPB 1000 milliGRAM(s) IV Intermittent every 24 hours          atorvastatin 20 milliGRAM(s) Oral at bedtime  dextrose 50% Injectable 12.5 Gram(s) IV Push once  dextrose 50% Injectable 25 Gram(s) IV Push once  dextrose 50% Injectable 25 Gram(s) IV Push once  dextrose Gel 1 Dose(s) Oral once PRN  glucagon  Injectable 1 milliGRAM(s) IntraMuscular once PRN  insulin glargine Injectable (LANTUS) 25 Unit(s) SubCutaneous at bedtime  insulin lispro (HumaLOG) corrective regimen sliding scale   SubCutaneous Before meals and at bedtime    aspirin  chewable 81 milliGRAM(s) Oral daily  dextrose 5%. 1000 milliLiter(s) IV Continuous <Continuous>      Complaint:     Otherwise 12 point review of systems is normal.    PHYSICAL EXAM:    Physical Exam: PHYSICAL EXAM:   Constitutional: NAD  Eyes: PERRLA, EOMI  ENMT: MMM, neck supple, no lymphadenopathy  Respiratory: CTAB, no r/r  Cardiovascular: RRR, audible S1S2, no murmur/rub/gallop  Gastrointestinal: Soft, NDNT  Extremities: As per podiatry  Vascular: DPs and Radial pulse palpaple  Neurological: AnOx3,   Skin: [id number] dark ulceration on bottom right toe, no visible exudate, mild surrounding erythema, non-tender to palpation.  Lymph Nodes: no lympadenopathy Musculoskeletal: full ROM on all extremities	        ICU Vital Signs Last 24 Hrs  T(C): 37 (03 Feb 2018 15:41), Max: 37 (03 Feb 2018 15:41)  T(F): 98.6 (03 Feb 2018 15:41), Max: 98.6 (03 Feb 2018 15:41)  HR: 53 (03 Feb 2018 15:41) (53 - 57)  BP: 167/77 (03 Feb 2018 15:41) (144/79 - 167/77)  BP(mean): --  ABP: --  ABP(mean): --  RR: 16 (03 Feb 2018 15:41) (16 - 17)  SpO2: 96% (03 Feb 2018 15:41) (96% - 98%)        ECG:      CHEST X RAY    CT    MRI    MRA    CT ANGIO    CAROTID DUPLEX    DUPLEX     Echocardiogram    Catheterization:    Stress Test:     LABS:	 	  CARDIAC MARKERS:                              10.4   9.0   )-----------( 253      ( 02 Feb 2018 06:35 )             30.6     02-03    138  |  101  |  27<H>  ----------------------------<  209<H>  4.4   |  25  |  1.49<H>    Ca    9.2      03 Feb 2018 06:56  Mg     2.1     02-03      proBNP:   Lipid Profile:   HgA1c: Hemoglobin A1C, Whole Blood: <4.2 % (02-01 @ 05:50)    TSH: Thyroid Stimulating Hormone, Serum: 1.856 uIU/mL (02-01 @ 05:52)            ASSESSMENT/PLAN: 	  68 yo female pmh of DM2 on insulin, CKD, who presented with ulcer of right toe    Problem/Plan - 1:  ·  Problem: Toe ulcer due to DM.  Plan: Appeared well healed, no exudate or discharge, no pain, Xray showed no bone involvement, ESR is elevated however not near osteo range. Likely mild-moderate foot ulcer, no systemic involvement.   -Patient improving on Cefepime, will dose renally q24h based on CrCl 28, once CrCl greater than 30 can do q12h dosing.   -Vanc 1g q12h  -Flagyl 500mg q8h  -Foot MR to r/o osteo  -Spoke with Podiatry in AM: no indication for bone biopsy from their stand point. Ok with Foot MR at this time.   -ID also following.     Problem/Plan - 2:  ·  Problem: CKD (chronic kidney disease).  Plan: Outpatient lab 1.3-1.9, patient was on dialysis in the past.   -Patient was started on lasix, metoprolol and terazosin by dr. Mayorga, unclear why. Would continue for now.     Problem/Plan - 3:  ·  Problem: Metabolic alkalosis.  Plan: Elevated bicarb, likely 2/2 to contraction alkylosis 2/2 to lasix.     Problem/Plan - 4:  ·  Problem: IDDM (insulin dependent diabetes mellitus).  Plan: DM2, insulin dependent, with complication with foot ulceration  -Lantus 25U, will increase basal if needed  -MISS.     Problem/Plan - 5:  ·  Problem: Hypercalcemia.  Plan: On admission, most likely secondary to dehydration, now wnl. Protein gap no longer present.   -follow up with vit D, PTH, PTHrP.     Problem/Plan - 6:  Problem: Nutrition, metabolism, and development symptoms. Plan: F: No fluids currently  E: Replete PRN  N: Consistent Carbohydrates.    Problem/Plan - 7:  ·  Problem: Need for prophylactic measure.  Plan: P: Low risk (Improve 1), no pharmacologic DVT, encourage ambulation.  C: FULL CODE  D: Continue on RMF under Dr. Thurman pending ID evaluation; expected discharge 24-48h pending blood cultures and Podiatry.
Chief Complaint/Reason for Consult: cv mgmt  INTERVAL HPI: no cp sob palp no s/s cardiac decompensation  	  MEDICATIONS:  metoprolol succinate  milliGRAM(s) Oral daily  tamsulosin 0.8 milliGRAM(s) Oral at bedtime    cefepime Injectable. 2000 milliGRAM(s) IV Push every 24 hours  metroNIDAZOLE    Tablet 500 milliGRAM(s) Oral every 8 hours  vancomycin  IVPB 1000 milliGRAM(s) IV Intermittent every 24 hours          atorvastatin 20 milliGRAM(s) Oral at bedtime  dextrose 50% Injectable 12.5 Gram(s) IV Push once  dextrose 50% Injectable 25 Gram(s) IV Push once  dextrose 50% Injectable 25 Gram(s) IV Push once  dextrose Gel 1 Dose(s) Oral once PRN  glucagon  Injectable 1 milliGRAM(s) IntraMuscular once PRN  insulin glargine Injectable (LANTUS) 25 Unit(s) SubCutaneous at bedtime  insulin lispro (HumaLOG) corrective regimen sliding scale   SubCutaneous Before meals and at bedtime    aspirin  chewable 81 milliGRAM(s) Oral daily  dextrose 5%. 1000 milliLiter(s) IV Continuous <Continuous>      REVIEW OF SYSTEMS:  [x] As per HPI  CONSTITUTIONAL: No fever, weight loss, or fatigue  RESPIRATORY: No cough, wheezing, chills or hemoptysis; No Shortness of Breath  CARDIOVASCULAR: No chest pain, palpitations, dizziness, or leg swelling  GASTROINTESTINAL: No abdominal or epigastric pain. No nausea, vomiting, or hematemesis; No diarrhea or constipation. No melena or hematochezia.  MUSCULOSKELETAL: No joint pain or swelling; No muscle, back, or extremity pain  [x] All others negative	  [ ] Unable to obtain    PHYSICAL EXAM:  T(C): 36.8 (02-04-18 @ 08:22), Max: 37 (02-03-18 @ 15:41)  HR: 55 (02-04-18 @ 08:22) (53 - 68)  BP: 162/65 (02-04-18 @ 08:22) (162/65 - 189/80)  RR: 17 (02-04-18 @ 08:22) (16 - 18)  SpO2: 99% (02-04-18 @ 08:22) (96% - 100%)  Wt(kg): --  I&O's Summary        Appearance: Normal	  HEENT:   Normal oral mucosa  Cardiovascular: Normal S1 S2, No JVD, No murmurs, No edema  Respiratory: Lungs clear to auscultation	  Gastrointestinal:  Soft, Non-tender, + BS	  Extremities: Normal range of motion, No clubbing, cyanosis or edema  Vascular: Peripheral pulses palpable 2+ bilaterally    TELEMETRY: 	    ECG:   	  RADIOLOGY:   CXR:  CT:  US:    CARDIAC TESTING:  Echocardiogram:  Catheterization:  Stress Test:      LABS:	 	    CARDIAC MARKERS:                                  10.9   9.2   )-----------( 263      ( 04 Feb 2018 06:31 )             32.0     02-04    138  |  102  |  22  ----------------------------<  235<H>  4.7   |  22  |  1.40<H>    Ca    9.6      04 Feb 2018 06:31  Mg     2.2     02-04      proBNP:   Lipid Profile:   HgA1c:   TSH:     ASSESSMENT/PLAN: 	    Problem: Pure hypercholesterolemia.  Plan: continue Lipitor.       Problem: Essential hypertension.  Plan: on Metoprolol. BP at goal
INFECTIOUS DISEASE SERVICE INITIAL CONSULTATION NOTE    HPI:  Patient is a 68 yo female pmh of DM2 on insulin, CKD, who presented with ulcer of right toe. Patient stated she noticed the ulcer 2 days ago, unclear the trigger, thought it was just ink marker, denied pain, no exudate, no open wound, never had ulcer in the past, denied fever/chill, no n/v. Been following up with Dr. Lee, who sent the patient here for IV antibiotics.  Patient was treated for osteo of left foot with cipro and clinda IV outpatient 2017, followed by Dr. Alvarado previously.  In the ED, patient given vancomycin, ESR and CRP elevated, Xray of foot showed no osteo. Evaluated by podiatry. (2018 16:49)      PAST MEDICAL & SURGICAL HISTORY:  CKD (chronic kidney disease)  IDDM (insulin dependent diabetes mellitus)  DM (diabetes mellitus)  HLD (hyperlipidemia)  HTN (hypertension)  No significant past surgical history      REVIEW OF SYSTEMS:  Constitutional: no weakness, no fevers, no chills  Dermatologic: no rash  Respiratory: no SOB, no cough  Cardiovascular: no chest pain, no palpitations  Gastrointestinal: no nausea, no vomiting, no diarrhea  Genitourinary: no dysuria, no urinary frequency, no hematuria, no urinary retention  Musculoskeletal:	+foot pain  Neurological: no focal weakness or numbness  Endocrine: no polyuria, no polydipsia    ACTIVE ANTIMICROBIAL/ANTIBIOTIC MEDICATIONS:  Cefepime, vancomycin    OTHER MEDICATIONS:  aspirin  chewable 81 milliGRAM(s) Oral daily  atorvastatin 20 milliGRAM(s) Oral at bedtime  dextrose 5%. 1000 milliLiter(s) IV Continuous <Continuous>  dextrose 50% Injectable 12.5 Gram(s) IV Push once  dextrose 50% Injectable 25 Gram(s) IV Push once  dextrose 50% Injectable 25 Gram(s) IV Push once  dextrose Gel 1 Dose(s) Oral once PRN  glucagon  Injectable 1 milliGRAM(s) IntraMuscular once PRN  insulin glargine Injectable (LANTUS) 25 Unit(s) SubCutaneous at bedtime  insulin lispro (HumaLOG) corrective regimen sliding scale   SubCutaneous Before meals and at bedtime  metoprolol succinate  milliGRAM(s) Oral daily  Pre Pen Benzylpenicilloyl Polylysine 1 Vial(s),Penicillin G Potassium (10,000 units/mL) 10 milliLiter(s),Histamine (1mg/ml) 0.5 milliLiter(s),0.9% Sodium Chloride vial 10 milliLiter(s) 1 Vial(s) IntraDermal once  tamsulosin 0.8 milliGRAM(s) Oral at bedtime      ALLERGIES:  Allergies    penicillin (Rash)  Penicillin Testing Negative (Unknown)    Intolerances        SOCIAL HISTORY:      FAMILY HISTORY:  FAMILY HISTORY:  No pertinent family history in first degree relatives      VITAL SIGNS:  ICU Vital Signs Last 24 Hrs  T(C): 36.9 (2018 12:00), Max: 37 (2018 23:56)  T(F): 98.4 (2018 12:00), Max: 98.6 (2018 23:56)  HR: 53 (2018 12:00) (53 - 62)  BP: 147/67 (2018 12:00) (127/55 - 172/81)  BP(mean): --  ABP: --  ABP(mean): --  RR: 17 (2018 12:00) (17 - 18)  SpO2: 97% (2018 12:00) (95% - 100%)      PHYSICAL EXAM:  Constitutional: WDWN  Head: NC/AT  Eyes: anicteric slcera  ENMT: no rhinorrhea; no sinus tenderness on palpation; no oropharyngeal lesions, erythema, or exudates	  Neck: supple; no JVD or LAD  Respiratory: CTA B/L  Cardiovascular: +S1/S2, RRR; no appreciable murmurs  Gastrointestinal: soft, NT/ND; +BSx4, no HSM  Extremities: Right foot with 2.5cm x 2.5cm wound with dark center and surrounding erythema  Vascular: 2+ radial, DP/PT pulses B/L  Neurologic: AAOx3; no focal deficits    LABS:                        10.2   8.4   )-----------( 258      ( 2018 05:50 )             30.6     02-    139  |  103  |  29<H>  ----------------------------<  103<H>  3.8   |  26  |  1.47<H>    Ca    9.4      2018 05:52    TPro  7.4  /  Alb  3.7  /  TBili  0.4  /  DBili  x   /  AST  21  /  ALT  15  /  AlkPhos  76      PT/INR - ( 2018 14:45 )   PT: 11.6 sec;   INR: 1.04          PTT - ( 2018 14:45 )  PTT:32.0 sec  Urinalysis Basic - ( 2018 01:23 )    Color: Yellow / Appearance: Clear / S.020 / pH: x  Gluc: x / Ketone: NEGATIVE  / Bili: Negative / Urobili: 0.2 E.U./dL   Blood: x / Protein: Trace mg/dL / Nitrite: NEGATIVE   Leuk Esterase: NEGATIVE / RBC: < 5 /HPF / WBC < 5 /HPF   Sq Epi: x / Non Sq Epi: 0-5 /HPF / Bacteria: Present /HPF        MICROBIOLOGY:    Culture - Blood (collected 18 @ 16:02)  Source: .Blood Blood-Peripheral  Preliminary Report (18 @ 05:01):    No growth at 12 hours    Culture - Blood (collected 18 @ 16:02)  Source: .Blood Blood-Peripheral  Preliminary Report (18 @ 05:01):    No growth at 12 hours        RADIOLOGY & ADDITIONAL STUDIES:  < from: Xray Foot AP + Lateral + Oblique, Right (18 @ 15:24) >  EXAM:  XR FOOT-RIGHT MIN 3 VIEWS                          PROCEDURE DATE:  2018                     INTERPRETATION:  Indication: great toe ulcer, r/o osteo    3 views of the right foot demonstrate soft tissue swelling of the great   toe. There is lucency near the proximal phalanx. This may be secondary to   ulceration. No periosteal reaction or erosion is identified. There are   hypertrophic changes of the first MTP joint.      Impression: Soft tissue swelling and possible ulceration. No radiographic   evidence of osteomyelitis. If there is concern for underlying   osteomyelitis an MRI or bone scan should be considered    < end of copied text >
INTERVAL HISTORY:  No CP or SOB  	  MEDICATIONS:  metoprolol succinate  milliGRAM(s) Oral daily  tamsulosin 0.8 milliGRAM(s) Oral at bedtime  cefepime Injectable. 2000 milliGRAM(s) IV Push every 24 hours  metroNIDAZOLE    Tablet 500 milliGRAM(s) Oral every 8 hours  vancomycin  IVPB 1000 milliGRAM(s) IV Intermittent every 24 hours  atorvastatin 20 milliGRAM(s) Oral at bedtime  dextrose 50% Injectable 12.5 Gram(s) IV Push once  dextrose 50% Injectable 25 Gram(s) IV Push once  dextrose 50% Injectable 25 Gram(s) IV Push once  dextrose Gel 1 Dose(s) Oral once PRN  glucagon  Injectable 1 milliGRAM(s) IntraMuscular once PRN  insulin glargine Injectable (LANTUS) 25 Unit(s) SubCutaneous at bedtime  insulin lispro (HumaLOG) corrective regimen sliding scale   SubCutaneous Before meals and at bedtime    aspirin  chewable 81 milliGRAM(s) Oral daily  dextrose 5%. 1000 milliLiter(s) IV Continuous <Continuous>        PHYSICAL EXAM:  T(C): 36.7 (02-02-18 @ 05:33), Max: 37 (02-01-18 @ 18:25)  HR: 52 (02-02-18 @ 05:33) (52 - 65)  BP: 153/63 (02-02-18 @ 05:33) (136/70 - 189/84)  RR: 16 (02-02-18 @ 05:33) (16 - 18)  SpO2: 98% (02-02-18 @ 05:33) (96% - 98%)  Wt(kg): --  I&O's Summary    Height (cm): 154.94 (02-01 @ 23:42)  Weight (kg): 67.1 (02-01 @ 23:42)  BMI (kg/m2): 28 (02-01 @ 23:42)  BSA (m2): 1.66 (02-01 @ 23:42)    Appearance: Normal	  HEENT:   Normal oral mucosa, PERRL, EOMI	  Lymphatic: No lymphadenopathy  Cardiovascular: Normal S1 S2, No JVD, No murmurs, No edema  Respiratory: Lungs clear to auscultation	  Psychiatry: A & O x 3, Mood & affect appropriate  Gastrointestinal:  Soft, Non-tender, + BS	  Skin: No rashes, No ecchymoses, No cyanosis  Neurologic: Non-focal  Extremities: Normal range of motion, No clubbing, cyanosis or edema, right foot ulcer  Vascular: Peripheral pulses palpable 2+ bilaterally    TELEMETRY: 	    ECG:  	  RADIOLOGY:   DIAGNOSTIC TESTING:  [ ] Echocardiogram:  [ ]  Catheterization:  [ ] Stress Test:    OTHER: 	    LABS:	 	    CARDIAC MARKERS:                                  10.4   9.0   )-----------( 253      ( 02 Feb 2018 06:35 )             30.6     02-02    139  |  102  |  30<H>  ----------------------------<  169<H>  4.0   |  24  |  1.62<H>    Ca    9.2      02 Feb 2018 06:35  Mg     2.2     02-02    TPro  6.8  /  Alb  x   /  TBili  x   /  DBili  x   /  AST  x   /  ALT  x   /  AlkPhos  x   02-01    proBNP:   Lipid Profile:   HgA1c:   TSH:     ASSESSMENT/PLAN:
INTERVAL HISTORY:  foot ulcer  DM  HTN  	  MEDICATIONS:  metoprolol succinate  milliGRAM(s) Oral daily  tamsulosin 0.8 milliGRAM(s) Oral at bedtime    cefepime Injectable. 2000 milliGRAM(s) IV Push every 24 hours  metroNIDAZOLE    Tablet 500 milliGRAM(s) Oral every 8 hours  vancomycin  IVPB 1000 milliGRAM(s) IV Intermittent every 24 hours  atorvastatin 20 milliGRAM(s) Oral at bedtime  dextrose 50% Injectable 12.5 Gram(s) IV Push once  dextrose 50% Injectable 25 Gram(s) IV Push once  dextrose 50% Injectable 25 Gram(s) IV Push once  dextrose Gel 1 Dose(s) Oral once PRN  glucagon  Injectable 1 milliGRAM(s) IntraMuscular once PRN  insulin glargine Injectable (LANTUS) 35 Unit(s) SubCutaneous at bedtime  insulin lispro (HumaLOG) corrective regimen sliding scale   SubCutaneous Before meals and at bedtime  insulin lispro Injectable (HumaLOG) 3 Unit(s) SubCutaneous three times a day before meals    aspirin  chewable 81 milliGRAM(s) Oral daily  dextrose 5%. 1000 milliLiter(s) IV Continuous <Continuous>        PHYSICAL EXAM:  T(C): 36.9 (02-05-18 @ 05:07), Max: 37.1 (02-04-18 @ 20:42)  HR: 58 (02-05-18 @ 05:07) (52 - 58)  BP: 144/73 (02-05-18 @ 05:07) (144/73 - 162/76)  RR: 18 (02-05-18 @ 05:07) (16 - 18)  SpO2: 97% (02-05-18 @ 05:07) (95% - 99%)  Wt(kg): --  I&O's Summary        Appearance: Normal	  HEENT:   Normal oral mucosa, PERRL, EOMI	  Lymphatic: No lymphadenopathy  Cardiovascular: Normal S1 S2, No JVD, No murmurs, No edema  Respiratory: Lungs clear to auscultation	  Psychiatry: A & O x 3, Mood & affect appropriate  Gastrointestinal:  Soft, Non-tender, + BS	  Skin: No rashes, No ecchymoses, No cyanosis  Neurologic: Non-focal  Extremities: Normal range of motion, No clubbing, cyanosis or edema, + ulcer   Vascular: Peripheral pulses palpable 2+ bilaterally    TELEMETRY: 	    ECG:  	  RADIOLOGY:   DIAGNOSTIC TESTING:  [ ] Echocardiogram:  [ ]  Catheterization:  [ ] Stress Test:    OTHER: 	    LABS:	 	    CARDIAC MARKERS:                                  10.9   9.2   )-----------( 263      ( 04 Feb 2018 06:31 )             32.0     02-05    136  |  102  |  23  ----------------------------<  163<H>  4.5   |  22  |  1.41<H>    Ca    9.4      05 Feb 2018 06:47  Mg     2.2     02-05      proBNP:   Lipid Profile:   HgA1c:   TSH:     ASSESSMENT/PLAN:
INTERVAL HPI/OVERNIGHT EVENTS:    Patient was seen and examined.  States her foot is feeling better.  No fevers, no drainage.  Swelling has decreased as has the erythema     CONSTITUTIONAL:  Negative fever or chills, feels well, good appetite  EYES:  Negative  blurry vision or double vision  CARDIOVASCULAR:  Negative for chest pain or palpitations  RESPIRATORY:  Negative for cough, wheezing, or SOB   GASTROINTESTINAL:  Negative for nausea, vomiting, diarrhea, constipation, or abdominal pain  GENITOURINARY:  Negative frequency, urgency or dysuria  NEUROLOGIC:  No headache, confusion, dizziness, lightheadedness      ANTIBIOTICS/RELEVANT:    MEDICATIONS  (STANDING):  aspirin  chewable 81 milliGRAM(s) Oral daily  atorvastatin 20 milliGRAM(s) Oral at bedtime  cefepime Injectable. 2000 milliGRAM(s) IV Push every 24 hours  dextrose 5%. 1000 milliLiter(s) (50 mL/Hr) IV Continuous <Continuous>  dextrose 50% Injectable 12.5 Gram(s) IV Push once  dextrose 50% Injectable 25 Gram(s) IV Push once  dextrose 50% Injectable 25 Gram(s) IV Push once  insulin glargine Injectable (LANTUS) 25 Unit(s) SubCutaneous at bedtime  insulin lispro (HumaLOG) corrective regimen sliding scale   SubCutaneous Before meals and at bedtime  metoprolol succinate  milliGRAM(s) Oral daily  metroNIDAZOLE    Tablet 500 milliGRAM(s) Oral every 8 hours  Pre Pen Benzylpenicilloyl Polylysine 1 Vial(s),Penicillin G Potassium (10,000 units/mL) 10 milliLiter(s),Histamine (1mg/ml) 0.5 milliLiter(s),0.9% Sodium Chloride vial 10 milliLiter(s) 1 Vial(s) IntraDermal once  tamsulosin 0.8 milliGRAM(s) Oral at bedtime  vancomycin  IVPB 1000 milliGRAM(s) IV Intermittent every 24 hours    MEDICATIONS  (PRN):  dextrose Gel 1 Dose(s) Oral once PRN Blood Glucose LESS THAN 70 milliGRAM(s)/deciliter  glucagon  Injectable 1 milliGRAM(s) IntraMuscular once PRN Glucose LESS THAN 70 milligrams/deciliter        Vital Signs Last 24 Hrs  T(C): 36.5 (03 Feb 2018 09:15), Max: 36.7 (02 Feb 2018 20:20)  T(F): 97.7 (03 Feb 2018 09:15), Max: 98 (02 Feb 2018 20:20)  HR: 57 (03 Feb 2018 09:15) (54 - 57)  BP: 151/66 (03 Feb 2018 09:15) (144/79 - 165/78)  BP(mean): --  RR: 17 (03 Feb 2018 09:15) (16 - 18)  SpO2: 97% (03 Feb 2018 09:15) (96% - 98%)    PHYSICAL EXAM:  Constitutional:  non-toxic, no distress  Eyes:MARIZOL, EOMI  Ear/Nose/Throat: no sinus tenderness on percussion	  Neck:  supple  Respiratory: CTA geovany  Cardiovascular: S1S2 RRR, no murmurs  Gastrointestinal:soft, (+) BS, no HSM  Extremities: right lower extremity with chronic appearing ulcer at base of 1st toe, no drainage, no swelling, mild erythema, non-tender to touch   Vascular: DP Pulse:	right normal; left normal      LABS:                        10.4   9.0   )-----------( 253      ( 02 Feb 2018 06:35 )             30.6     02-03    138  |  101  |  27<H>  ----------------------------<  209<H>  4.4   |  25  |  1.49<H>    Ca    9.2      03 Feb 2018 06:56  Mg     2.1     02-03    TPro  6.8  /  Alb  3.5<L>  /  TBili  x   /  DBili  x   /  AST  x   /  ALT  x   /  AlkPhos  x   02-01          MICROBIOLOGY:  Culture - Blood (01.31.18 @ 16:02)    Specimen Source: .Blood Blood-Peripheral    Culture Results:   No growth at 2 days.        RADIOLOGY & ADDITIONAL STUDIES:    MRI:  pending
INTERVAL HPI/OVERNIGHT EVENTS:  Afebrile overnight patient with no complaints/    CONSTITUTIONAL:  Negative fever or chills, feels well, good appetite  EYES:  Negative  blurry vision or double vision  CARDIOVASCULAR:  Negative for chest pain or palpitations  RESPIRATORY:  Negative for cough, wheezing, or SOB   GASTROINTESTINAL:  Negative for nausea, vomiting, diarrhea, constipation, or abdominal pain  GENITOURINARY:  Negative frequency, urgency or dysuria  NEUROLOGIC:  No headache, confusion, dizziness, lightheadedness      ANTIBIOTICS/RELEVANT:  vancomycin, cefepime, Flagyl    MEDICATIONS  (STANDING):  aspirin  chewable 81 milliGRAM(s) Oral daily  atorvastatin 20 milliGRAM(s) Oral at bedtime  cefepime Injectable. 2000 milliGRAM(s) IV Push every 24 hours  dextrose 5%. 1000 milliLiter(s) (50 mL/Hr) IV Continuous <Continuous>  dextrose 50% Injectable 12.5 Gram(s) IV Push once  dextrose 50% Injectable 25 Gram(s) IV Push once  dextrose 50% Injectable 25 Gram(s) IV Push once  insulin glargine Injectable (LANTUS) 25 Unit(s) SubCutaneous at bedtime  insulin lispro (HumaLOG) corrective regimen sliding scale   SubCutaneous Before meals and at bedtime  metoprolol succinate  milliGRAM(s) Oral daily  metroNIDAZOLE    Tablet 500 milliGRAM(s) Oral every 8 hours  Pre Pen Benzylpenicilloyl Polylysine 1 Vial(s),Penicillin G Potassium (10,000 units/mL) 10 milliLiter(s),Histamine (1mg/ml) 0.5 milliLiter(s),0.9% Sodium Chloride vial 10 milliLiter(s) 1 Vial(s) IntraDermal once  tamsulosin 0.8 milliGRAM(s) Oral at bedtime  vancomycin  IVPB 1000 milliGRAM(s) IV Intermittent every 24 hours    MEDICATIONS  (PRN):  dextrose Gel 1 Dose(s) Oral once PRN Blood Glucose LESS THAN 70 milliGRAM(s)/deciliter  glucagon  Injectable 1 milliGRAM(s) IntraMuscular once PRN Glucose LESS THAN 70 milligrams/deciliter        Vital Signs Last 24 Hrs  T(C): 36.3 (2018 10:01), Max: 37 (2018 18:25)  T(F): 97.4 (2018 10:01), Max: 98.6 (2018 18:25)  HR: 51 (2018 10:01) (51 - 65)  BP: 149/82 (2018 10:01) (136/70 - 189/84)  BP(mean): --  RR: 17 (2018 10:01) (16 - 18)  SpO2: 97% (2018 10:01) (96% - 98%)    PHYSICAL EXAM:  Constitutional: WDWN  Head: NC/AT  Eyes: anicteric sclera  ENMT: no rhinorrhea; no sinus tenderness on palpation; no oropharyngeal lesions, erythema, or exudates	  Neck: supple; no JVD or LAD  Respiratory: CTA B/L  Cardiovascular: +S1/S2, RRR; no appreciable murmurs  Gastrointestinal: soft, NT/ND; +BSx4, no HSM  Extremities: Right foot wrapped with clean and dry dressing  Neurologic: AAOx3; no focal deficits      LABS:                        10.4   9.0   )-----------( 253      ( 2018 06:35 )             30.6     02-    139  |  102  |  30<H>  ----------------------------<  169<H>  4.0   |  24  |  1.62<H>    Ca    9.2      2018 06:35  Mg     2.2         TPro  6.8  /  Alb  x   /  TBili  x   /  DBili  x   /  AST  x   /  ALT  x   /  AlkPhos  x         Urinalysis Basic - ( 2018 01:23 )    Color: Yellow / Appearance: Clear / S.020 / pH: x  Gluc: x / Ketone: NEGATIVE  / Bili: Negative / Urobili: 0.2 E.U./dL   Blood: x / Protein: Trace mg/dL / Nitrite: NEGATIVE   Leuk Esterase: NEGATIVE / RBC: < 5 /HPF / WBC < 5 /HPF   Sq Epi: x / Non Sq Epi: 0-5 /HPF / Bacteria: Present /HPF        MICROBIOLOGY:    Culture - Blood (collected 2018 16:02)  Source: .Blood Blood-Peripheral  Preliminary Report (2018 17:02):    No growth at 1 day.    Culture - Blood (collected 2018 16:02)  Source: .Blood Blood-Peripheral  Preliminary Report (2018 17:02):    No growth at 1 day.      RADIOLOGY & ADDITIONAL STUDIES:
INTERVAL HPI/OVERNIGHT EVENTS:  Patient anxious to hear the final results of her MRI, otherwise feeling well.     CONSTITUTIONAL:  Negative fever or chills, feels well, good appetite  EYES:  Negative  blurry vision or double vision  CARDIOVASCULAR:  Negative for chest pain or palpitations  RESPIRATORY:  Negative for cough, wheezing, or SOB   GASTROINTESTINAL:  Negative for nausea, vomiting, diarrhea, constipation, or abdominal pain  GENITOURINARY:  Negative frequency, urgency or dysuria  NEUROLOGIC:  No headache, confusion, dizziness, lightheadedness      ANTIBIOTICS/RELEVANT:  cefepime, vancomycin, Flagyl    MEDICATIONS  (STANDING):  aspirin  chewable 81 milliGRAM(s) Oral daily  atorvastatin 20 milliGRAM(s) Oral at bedtime  cefepime Injectable. 2000 milliGRAM(s) IV Push every 24 hours  dextrose 5%. 1000 milliLiter(s) (50 mL/Hr) IV Continuous <Continuous>  dextrose 50% Injectable 12.5 Gram(s) IV Push once  dextrose 50% Injectable 25 Gram(s) IV Push once  dextrose 50% Injectable 25 Gram(s) IV Push once  insulin glargine Injectable (LANTUS) 35 Unit(s) SubCutaneous at bedtime  insulin lispro (HumaLOG) corrective regimen sliding scale   SubCutaneous Before meals and at bedtime  insulin lispro Injectable (HumaLOG) 3 Unit(s) SubCutaneous three times a day before meals  metoprolol succinate  milliGRAM(s) Oral daily  metroNIDAZOLE    Tablet 500 milliGRAM(s) Oral every 8 hours  Pre Pen Benzylpenicilloyl Polylysine 1 Vial(s),Penicillin G Potassium (10,000 units/mL) 10 milliLiter(s),Histamine (1mg/ml) 0.5 milliLiter(s),0.9% Sodium Chloride vial 10 milliLiter(s) 1 Vial(s) IntraDermal once  tamsulosin 0.8 milliGRAM(s) Oral at bedtime  vancomycin  IVPB 1000 milliGRAM(s) IV Intermittent every 24 hours    MEDICATIONS  (PRN):  dextrose Gel 1 Dose(s) Oral once PRN Blood Glucose LESS THAN 70 milliGRAM(s)/deciliter  glucagon  Injectable 1 milliGRAM(s) IntraMuscular once PRN Glucose LESS THAN 70 milligrams/deciliter        Vital Signs Last 24 Hrs  T(C): 36.4 (05 Feb 2018 09:09), Max: 37.1 (04 Feb 2018 20:42)  T(F): 97.5 (05 Feb 2018 09:09), Max: 98.7 (04 Feb 2018 20:42)  HR: 56 (05 Feb 2018 09:09) (52 - 58)  BP: 166/72 (05 Feb 2018 09:09) (144/73 - 166/72)  BP(mean): --  RR: 19 (05 Feb 2018 09:09) (16 - 19)  SpO2: 95% (05 Feb 2018 09:09) (95% - 99%)    PHYSICAL EXAM:  Constitutional: WDWN  Head: NC/AT  Eyes: anicteric sclera  ENMT: no rhinorrhea; no sinus tenderness on palpation; no oropharyngeal lesions, erythema, or exudates	  Neck: supple; no JVD or LAD  Respiratory: CTA B/L  Cardiovascular: +S1/S2, RRR; no appreciable murmurs  Gastrointestinal: soft, NT/ND; +BSx4, no HSM  Extremities: Right foot wrapped with clean and dry dressing  Neurologic: AAOx3; no focal deficits      LABS:                        10.9   9.2   )-----------( 263      ( 04 Feb 2018 06:31 )             32.0     02-05    136  |  102  |  23  ----------------------------<  163<H>  4.5   |  22  |  1.41<H>    Ca    9.4      05 Feb 2018 06:47  Mg     2.2     02-05            MICROBIOLOGY:  Culture - Blood (01.31.18 @ 16:02)    Specimen Source: .Blood Blood-Peripheral    Culture Results:   No growth at 4 days.        RADIOLOGY & ADDITIONAL STUDIES:  < from: MR Foot No Cont, Right (02.02.18 @ 21:54) >  EXAM:  MR FOOT RT                          PROCEDURE DATE:  02/02/2018                     INTERPRETATION:  MRI right foot dated 2/2/2018    TECHNIQUE: Multiplanar multi-sequential MR images of the right foot were   obtained without contrast according to standard protocol.      HISTORY: Toe ulcer due to diabetes mellitus. Evaluate for osteomyelitis.    COMPARISON: Right foot radiographs 1/31/2018      FINDINGS: Evaluation of the soft tissues reveals a defect in the plantar   skin surface of thegreat toe near the IP joint. This extends proximally   along the medial skin surface toward the metatarsophalangeal joint.  No associated drainable fluid collection is identified. Skin defect does   not appear to extend to the underlying bone. Halluxsesamoids are   lateralized and the mildred is effaced. A small volume of fluid is present   in the plantar aspect of the interphalangeal joint. There are cystic   appearing changes in the plantar distal aspects of the first metatarsal   head and the base of the proximal phalanx. No periosteal reaction or   erosion is identified. T2 signal hyperintensity is present in the neck of   the first metatarsal with minimal T1 signal hypointensity. This is remote   from the skin ulceration and unlikely to represent acute osteomyelitis.   There is no evidence of acute fracture or dislocation.     No areas of erosion or aggressive-appearing bone destruction are   identified. There is dorsal subcutaneous edema throughout the mid and   forefoot. In the appropriate setting this would be compatible with   cellulitis.    The visualized flexor and extensor tendons are intact.    No evidence of Vela's neuroma or intermetatarsal bursitis.      IMPRESSION:  Superficial plantar medial skin ulceration from the base of the first   proximal phalanx to the IP joint without MR evidence of underlying   osteomyelitis.    Arthropathic changes at the first MTP joint. Edema like signal in the   neck of the first metatarsal may be related to arthritis. Osteomyelitis   in this region is considered unlikely.    < end of copied text >
INTERVAL HPI/OVERNIGHT EVENTS:  Patient was seen and examined at bedside. As per nurse and patient, no o/n events, patient resting comfortably. No complaints at this time other than poor sleep.   Patient denies: fever, chills, dizziness, weakness, HA, Changes in vision, CP, palpitations, SOB, cough, N/V/D/C, dysuria, changes in bowel movements, LE edema.    VITAL SIGNS:  Vital Signs Last 24 Hrs  T(C): 36.8 (04 Feb 2018 08:22), Max: 37 (03 Feb 2018 15:41)  T(F): 98.3 (04 Feb 2018 08:22), Max: 98.6 (03 Feb 2018 15:41)  HR: 55 (04 Feb 2018 08:22) (53 - 68)  BP: 162/65 (04 Feb 2018 08:22) (162/65 - 189/80)  BP(mean): --  RR: 17 (04 Feb 2018 08:22) (16 - 18)  SpO2: 99% (04 Feb 2018 08:22) (96% - 100%)    PHYSICAL EXAM:    Constitutional: WDWN, NAD  Eyes: PERRL, EOMI, sclera non-icteric  Neck: supple, trachea midline, no masses, no JVD  Respiratory: CTA b/l, good air entry b/l, no wheezing, no rhonchi, no rales, without accessory muscle use and no intercostal retractions  Cardiovascular: RRR, normal S1S2, no M/R/G  Gastrointestinal: soft, NTND, no masses palpable, BS normal  Extremities: WWP; no edema/cyanosis, [id number] black/dark ulceration on bottom of right first toe  Neurological: AAOx3, CN Grossly intact  Skin: Normal temperature, warm, dry  Psych: no evidence of behavioral disturbance      MEDICATIONS  (STANDING):  aspirin  chewable 81 milliGRAM(s) Oral daily  atorvastatin 20 milliGRAM(s) Oral at bedtime  cefepime Injectable. 2000 milliGRAM(s) IV Push every 24 hours  dextrose 5%. 1000 milliLiter(s) (50 mL/Hr) IV Continuous <Continuous>  dextrose 50% Injectable 12.5 Gram(s) IV Push once  dextrose 50% Injectable 25 Gram(s) IV Push once  dextrose 50% Injectable 25 Gram(s) IV Push once  insulin glargine Injectable (LANTUS) 25 Unit(s) SubCutaneous at bedtime  insulin lispro (HumaLOG) corrective regimen sliding scale   SubCutaneous Before meals and at bedtime  metoprolol succinate  milliGRAM(s) Oral daily  metroNIDAZOLE    Tablet 500 milliGRAM(s) Oral every 8 hours  Pre Pen Benzylpenicilloyl Polylysine 1 Vial(s),Penicillin G Potassium (10,000 units/mL) 10 milliLiter(s),Histamine (1mg/ml) 0.5 milliLiter(s),0.9% Sodium Chloride vial 10 milliLiter(s) 1 Vial(s) IntraDermal once  tamsulosin 0.8 milliGRAM(s) Oral at bedtime  vancomycin  IVPB 1000 milliGRAM(s) IV Intermittent every 24 hours    MEDICATIONS  (PRN):  dextrose Gel 1 Dose(s) Oral once PRN Blood Glucose LESS THAN 70 milliGRAM(s)/deciliter  glucagon  Injectable 1 milliGRAM(s) IntraMuscular once PRN Glucose LESS THAN 70 milligrams/deciliter      Allergies    penicillin (Rash)  Penicillin Testing Negative (Unknown)    Intolerances        LABS:                        10.9   9.2   )-----------( 263      ( 04 Feb 2018 06:31 )             32.0     02-04    138  |  102  |  22  ----------------------------<  235<H>  4.7   |  22  |  1.40<H>    Ca    9.6      04 Feb 2018 06:31  Mg     2.2     02-04            RADIOLOGY & ADDITIONAL TESTS:
O/N Events: ERICA  Subjective/ROS: Patient has no complaints at this time. Denies HA, CP, SOB, n/v, changes in bowel/urinary habits.  12pt ROS otherwise negative.    VITALS  Vital Signs Last 24 Hrs  T(C): 36.3 (2018 10:01), Max: 37 (2018 18:25)  T(F): 97.4 (2018 10:01), Max: 98.6 (2018 18:25)  HR: 51 (2018 10:01) (51 - 65)  BP: 149/82 (2018 10:01) (136/70 - 189/84)  BP(mean): --  RR: 17 (2018 10:01) (16 - 18)  SpO2: 97% (2018 10:) (96% - 98%)    CAPILLARY BLOOD GLUCOSE      POCT Blood Glucose.: 145 mg/dL (2018 08:18)  POCT Blood Glucose.: 191 mg/dL (2018 22:01)  POCT Blood Glucose.: 225 mg/dL (2018 16:11)  POCT Blood Glucose.: 183 mg/dL (2018 11:38)      PHYSICAL EXAM  General: A&Ox3; NAD  Head: NC/AT; MMM; PERRL; EOMI;  Neck: Supple; no JVD  Respiratory: CTA B/L; no wheezes/crackles   Cardiovascular: Regular rhythm/rate; S1/S2   Gastrointestinal: Soft; NTND; normoactive BS  Extremities: WWP; no edema/cyanosis, [id number] black/dark ulceration on bottom of right first toe.   Neurological:  CNII-XII grossly intact; no obvious focal deficits    MEDICATIONS  (STANDING):  aspirin  chewable 81 milliGRAM(s) Oral daily  atorvastatin 20 milliGRAM(s) Oral at bedtime  cefepime Injectable. 2000 milliGRAM(s) IV Push every 24 hours  dextrose 5%. 1000 milliLiter(s) (50 mL/Hr) IV Continuous <Continuous>  dextrose 50% Injectable 12.5 Gram(s) IV Push once  dextrose 50% Injectable 25 Gram(s) IV Push once  dextrose 50% Injectable 25 Gram(s) IV Push once  insulin glargine Injectable (LANTUS) 25 Unit(s) SubCutaneous at bedtime  insulin lispro (HumaLOG) corrective regimen sliding scale   SubCutaneous Before meals and at bedtime  metoprolol succinate  milliGRAM(s) Oral daily  metroNIDAZOLE    Tablet 500 milliGRAM(s) Oral every 8 hours  Pre Pen Benzylpenicilloyl Polylysine 1 Vial(s),Penicillin G Potassium (10,000 units/mL) 10 milliLiter(s),Histamine (1mg/ml) 0.5 milliLiter(s),0.9% Sodium Chloride vial 10 milliLiter(s) 1 Vial(s) IntraDermal once  tamsulosin 0.8 milliGRAM(s) Oral at bedtime  vancomycin  IVPB 1000 milliGRAM(s) IV Intermittent every 24 hours    MEDICATIONS  (PRN):  dextrose Gel 1 Dose(s) Oral once PRN Blood Glucose LESS THAN 70 milliGRAM(s)/deciliter  glucagon  Injectable 1 milliGRAM(s) IntraMuscular once PRN Glucose LESS THAN 70 milligrams/deciliter      penicillin (Rash)  Penicillin Testing Negative (Unknown)      LABS                        10.4   9.0   )-----------( 253      ( 2018 06:35 )             30.6     02-    139  |  102  |  30<H>  ----------------------------<  169<H>  4.0   |  24  |  1.62<H>    Ca    9.2      2018 06:35  Mg     2.2         TPro  6.8  /  Alb  x   /  TBili  x   /  DBili  x   /  AST  x   /  ALT  x   /  AlkPhos  x       PT/INR - ( 2018 14:45 )   PT: 11.6 sec;   INR: 1.04          PTT - ( 2018 14:45 )  PTT:32.0 sec  Urinalysis Basic - ( 2018 01:23 )    Color: Yellow / Appearance: Clear / S.020 / pH: x  Gluc: x / Ketone: NEGATIVE  / Bili: Negative / Urobili: 0.2 E.U./dL   Blood: x / Protein: Trace mg/dL / Nitrite: NEGATIVE   Leuk Esterase: NEGATIVE / RBC: < 5 /HPF / WBC < 5 /HPF   Sq Epi: x / Non Sq Epi: 0-5 /HPF / Bacteria: Present /HPF
O/N Events: ERICA  Subjective/ROS: Patient has no complaints at this time. Denies HA, CP, SOB, n/v, changes in bowel/urinary habits.  12pt ROS otherwise negative.    VITALS  Vital Signs Last 24 Hrs  T(C): 36.7 (2018 05:39), Max: 37 (2018 13:14)  T(F): 98.1 (2018 05:39), Max: 98.6 (2018 13:14)  HR: 56 (2018 05:39) (56 - 69)  BP: 127/55 (2018 05:39) (127/55 - 188/70)  BP(mean): --  RR: 18 (2018 05:39) (18 - 18)  SpO2: 96% (2018 05:39) (95% - 100%)    CAPILLARY BLOOD GLUCOSE  POCT Blood Glucose.: 106 mg/dL (2018 05:29)  POCT Blood Glucose.: 163 mg/dL (2018 23:02)      PHYSICAL EXAM  General: A&Ox3; NAD  Head: NC/AT; MMM; PERRL; EOMI;  Neck: Supple; no JVD  Respiratory: CTA B/L; no wheezes/crackles   Cardiovascular: Regular rhythm/rate; S1/S2   Gastrointestinal: Soft; NTND; normoactive BS  Extremities: WWP; no edema/cyanosis, [id number] black/dark ulceration on bottom of right first toe.   Neurological:  CNII-XII grossly intact; no obvious focal deficits    MEDICATIONS  (STANDING):  aspirin  chewable 81 milliGRAM(s) Oral daily  atorvastatin 20 milliGRAM(s) Oral at bedtime  dextrose 5%. 1000 milliLiter(s) (50 mL/Hr) IV Continuous <Continuous>  dextrose 50% Injectable 12.5 Gram(s) IV Push once  dextrose 50% Injectable 25 Gram(s) IV Push once  dextrose 50% Injectable 25 Gram(s) IV Push once  insulin glargine Injectable (LANTUS) 25 Unit(s) SubCutaneous at bedtime  insulin lispro (HumaLOG) corrective regimen sliding scale   SubCutaneous Before meals and at bedtime  metoprolol succinate  milliGRAM(s) Oral daily  tamsulosin 0.8 milliGRAM(s) Oral at bedtime    MEDICATIONS  (PRN):  dextrose Gel 1 Dose(s) Oral once PRN Blood Glucose LESS THAN 70 milliGRAM(s)/deciliter  glucagon  Injectable 1 milliGRAM(s) IntraMuscular once PRN Glucose LESS THAN 70 milligrams/deciliter      penicillin (Rash)      LABS                        10.2   8.4   )-----------( 258      ( 2018 05:50 )             30.6     02-    139  |  103  |  29<H>  ----------------------------<  103<H>  3.8   |  26  |  1.47<H>    Ca    9.4      2018 05:52    TPro  7.4  /  Alb  3.7  /  TBili  0.4  /  DBili  x   /  AST  21  /  ALT  15  /  AlkPhos  76  02-    PT/INR - ( 2018 14:45 )   PT: 11.6 sec;   INR: 1.04          PTT - ( 2018 14:45 )  PTT:32.0 sec  Urinalysis Basic - ( 2018 01:23 )    Color: Yellow / Appearance: Clear / S.020 / pH: x  Gluc: x / Ketone: NEGATIVE  / Bili: Negative / Urobili: 0.2 E.U./dL   Blood: x / Protein: Trace mg/dL / Nitrite: NEGATIVE   Leuk Esterase: NEGATIVE / RBC: < 5 /HPF / WBC < 5 /HPF   Sq Epi: x / Non Sq Epi: 0-5 /HPF / Bacteria: Present /HPF
PROGRESS NOTE   PT seen and examined bedside. Had MRI last night. Has no complaints at this time. no foot pain, tolerating antibiotics       HPI:  Patient is a 68 yo female pmh of DM2 on insulin, CKD, who presented with ulcer of right toe. Patient stated she noticed the ulcer 2 days ago, unclear the trigger, thought it was just ink marker, denied pain, no exudate, no open wound, never had ulcer in the past, denied fever/chill, no n/v. Been following up with Dr. Lee, who sent the patient here for IV antibiotics.  Patient was treated for osteo of left foot with cipro and clinda IV outpatient July 2017, followed by Dr. Alvarado previously.  In the ED, patient given vancomycin, ESR and CRP elevated, Xray of foot showed no osteo. Evaluated by podiatry. (31 Jan 2018 16:49)      Vital Signs Last 24 Hrs  T(C): 37 (03 Feb 2018 15:41), Max: 37 (03 Feb 2018 15:41)  T(F): 98.6 (03 Feb 2018 15:41), Max: 98.6 (03 Feb 2018 15:41)  HR: 53 (03 Feb 2018 15:41) (53 - 57)  BP: 167/77 (03 Feb 2018 15:41) (144/79 - 167/77)  BP(mean): --  RR: 16 (03 Feb 2018 15:41) (16 - 18)  SpO2: 96% (03 Feb 2018 15:41) (96% - 98%)                          10.4   9.0   )-----------( 253      ( 02 Feb 2018 06:35 )             30.6               02-03    138  |  101  |  27<H>  ----------------------------<  209<H>  4.4   |  25  |  1.49<H>    Ca    9.2      03 Feb 2018 06:56  Mg     2.1     02-03        PHYSICAL EXAM  GEN: JOSE WESTBROOK is a pleasant well-nourished, well developed 67y Female in no acute distress, alert awake, and oriented to person, place and time.   LE Focused:  Right foot DP/PT 2/4 Pop 1+, cft brisk,  plantar first toe  dry eschar with ~1cm of surrounding erythema. no purulent drainage.  well demarcated.  Left foot, severe bunion deformity with plantar callus and 2nd hammertoe deformity with dorsal pre-ulcerative lesion. no purulence or erythema     < from: Xray Foot AP + Lateral + Oblique, Right (01.31.18 @ 15:24) >    Impression: Soft tissue swelling and possible ulceration. No radiographic   evidence of osteomyelitis. If there is concern for underlying   osteomyelitis an MRI or bone scan should be considered    < end of copied text >
PROGRESS NOTE   Patient is a 67y old  Female who presents with a chief complaint of Foot ulcer (01 Feb 2018 13:41)      HPI: Patient is a 68 yo female pmh of DM2 on insulin, CKD, who presented with ulcer of right toe. Patient stated she noticed the ulcer 2 days ago, unclear the trigger, thought it was just ink marker, denied pain, no exudate, no open wound, never had ulcer in the past, denied fever/chill, no n/v. Been following up with Dr. Lee, who sent the patient here for IV antibiotics.  Patient was treated for osteo of left foot with cipro and clinda IV outpatient July 2017, followed by Dr. Alvarado previously.  In the ED, patient given vancomycin, ESR and CRP elevated, Xray of foot showed no osteo. Evaluated by podiatry. (31 Jan 2018 16:49)    INTERVAL HPI/ OVERNIGHT EVENTS: Pt seen and examined at bedside. Pt sitting up in bed comfortably at time of visit. Pt states that she is feeling well and denies n/v/f/c. Pt states that the redness and swelling in her right foot have greatly improved. Pt denies any pain.     Vital Signs Last 24 Hrs  T(C): 36.4 (05 Feb 2018 09:09), Max: 37.1 (04 Feb 2018 20:42)  T(F): 97.5 (05 Feb 2018 09:09), Max: 98.7 (04 Feb 2018 20:42)  HR: 56 (05 Feb 2018 09:09) (52 - 58)  BP: 166/72 (05 Feb 2018 09:09) (144/73 - 166/72)  BP(mean): --  RR: 19 (05 Feb 2018 09:09) (16 - 19)  SpO2: 95% (05 Feb 2018 09:09) (95% - 99%)    LABS:                        10.9   9.2   )-----------( 263      ( 04 Feb 2018 06:31 )             32.0               02-05    136  |  102  |  23  ----------------------------<  163<H>  4.5   |  22  |  1.41<H>    Ca    9.4      05 Feb 2018 06:47  Mg     2.2     02-05        PHYSICAL EXAM  GEN: JOSE WESTBROOK is a pleasant well-nourished, well developed 67y Female in no acute distress, alert awake, and oriented to person, place and time.   LE Focused: Dressings c/d/i. Cardenas Grade 1 ulcer of the plantar aspect of the right hallux measuring approximately 2.5cm x 3cm x 1cm, with overlying eschar, hyperkeratotic border, no undermining, no fluctuance, no drainage, no malodor, no periwound erythema, no clinical signs of infection
PROGRESS NOTE   Patient is a 67y old  Female who presents with a chief complaint of Foot ulcer (01 Feb 2018 13:41)      Interval History: Patient seen and evaluated at bedside resting comfortably in no pain.      Vital Signs Last 24 Hrs  T(C): 36.7 (02 Feb 2018 05:33), Max: 37 (01 Feb 2018 18:25)  T(F): 98.1 (02 Feb 2018 05:33), Max: 98.6 (01 Feb 2018 18:25)  HR: 52 (02 Feb 2018 05:33) (52 - 65)  BP: 153/63 (02 Feb 2018 05:33) (136/70 - 189/84)  BP(mean): --  RR: 16 (02 Feb 2018 05:33) (16 - 18)  SpO2: 98% (02 Feb 2018 05:33) (96% - 98%)                          10.4   9.0   )-----------( 253      ( 02 Feb 2018 06:35 )             30.6               02-02    139  |  102  |  30<H>  ----------------------------<  169<H>  4.0   |  24  |  1.62<H>    Ca    9.2      02 Feb 2018 06:35  Mg     2.2     02-02    TPro  6.8  /  Alb  x   /  TBili  x   /  DBili  x   /  AST  x   /  ALT  x   /  AlkPhos  x   02-01      PHYSICAL EXAM  GEN: JOSE WESTBROOK is a pleasant well-nourished, well developed 67y Female in no acute distress, alert awake, and oriented to person, place and time.   LE Focused: No change in neurovascular status since previous encounter.  Right lower extremity wound continues to show improvement with reduction of periwound erythema restricted to just the right hallux.  No purulence or drainage is appreciated at this time.  A stable eschar is present at the wound site just plantar to the right great toe sulcus.  The left lower extremity has a healed ulceration on the plantar forefoot.  The left 2nd digit has a hyperkeratotic lesion at the PIPJ.
PROGRESS NOTE   Patient is a 67y old  Female who presents with a chief complaint of Foot ulcer (31 Jan 2018 16:49)      Interval History: Patient seen and evaluated at beside resting comfortably in no pain.      Vital Signs Last 24 Hrs  T(C): 36.7 (01 Feb 2018 05:39), Max: 37 (31 Jan 2018 13:14)  T(F): 98.1 (01 Feb 2018 05:39), Max: 98.6 (31 Jan 2018 13:14)  HR: 56 (01 Feb 2018 05:39) (56 - 69)  BP: 127/55 (01 Feb 2018 05:39) (127/55 - 188/70)  BP(mean): --  RR: 18 (01 Feb 2018 05:39) (18 - 18)  SpO2: 96% (01 Feb 2018 05:39) (95% - 100%)                          10.2   8.4   )-----------( 258      ( 01 Feb 2018 05:50 )             30.6               02-01    139  |  103  |  29<H>  ----------------------------<  103<H>  3.8   |  26  |  1.47<H>    Ca    9.4      01 Feb 2018 05:52    TPro  7.4  /  Alb  3.7  /  TBili  0.4  /  DBili  x   /  AST  21  /  ALT  15  /  AlkPhos  76  02-01      PHYSICAL EXAM  GEN: JOSE WESTBROOK is a pleasant well-nourished, well developed 67y Female in no acute distress, alert awake, and oriented to person, place and time.   LE Focused:  DP and PT pulses palpable bilateral.  Right 1st MTPJ sulcus ulceration with eschar in place measures approximately 2.5x2cm without active drainage and approximately 2cm of periwound erythema.  Wound margins are regular and appear viable. Epicritic sensation and general sensorium are diminished bilaterally.  No neuromuscular deficits are appreciated at this time.
Pt seen and examined Coverage for Dr. Thurman    REVIEW OF SYSTEMS:  Constitutional: No fever, weight loss or fatigue  Cardiovascular: No chest pain, palpitations, dizziness or leg swelling  Gastrointestinal: No abdominal or epigastric pain. No nausea, vomiting or hematemesis; No diarrhea or constipation. No melena or hematochezia.  Skin: No itching, burning, rashes or lesions       MEDICATIONS:  MEDICATIONS  (STANDING):  aspirin  chewable 81 milliGRAM(s) Oral daily  atorvastatin 20 milliGRAM(s) Oral at bedtime  cefepime Injectable. 2000 milliGRAM(s) IV Push every 24 hours  dextrose 5%. 1000 milliLiter(s) (50 mL/Hr) IV Continuous <Continuous>  dextrose 50% Injectable 12.5 Gram(s) IV Push once  dextrose 50% Injectable 25 Gram(s) IV Push once  dextrose 50% Injectable 25 Gram(s) IV Push once  insulin glargine Injectable (LANTUS) 25 Unit(s) SubCutaneous at bedtime  insulin lispro (HumaLOG) corrective regimen sliding scale   SubCutaneous Before meals and at bedtime  metoprolol succinate  milliGRAM(s) Oral daily  metroNIDAZOLE    Tablet 500 milliGRAM(s) Oral every 8 hours  Pre Pen Benzylpenicilloyl Polylysine 1 Vial(s),Penicillin G Potassium (10,000 units/mL) 10 milliLiter(s),Histamine (1mg/ml) 0.5 milliLiter(s),0.9% Sodium Chloride vial 10 milliLiter(s) 1 Vial(s) IntraDermal once  tamsulosin 0.8 milliGRAM(s) Oral at bedtime  vancomycin  IVPB 1000 milliGRAM(s) IV Intermittent every 24 hours    MEDICATIONS  (PRN):  dextrose Gel 1 Dose(s) Oral once PRN Blood Glucose LESS THAN 70 milliGRAM(s)/deciliter  glucagon  Injectable 1 milliGRAM(s) IntraMuscular once PRN Glucose LESS THAN 70 milligrams/deciliter      Allergies    penicillin (Rash)  Penicillin Testing Negative (Unknown)    Intolerances        Vital Signs Last 24 Hrs  T(C): 36.8 (04 Feb 2018 08:22), Max: 37 (03 Feb 2018 15:41)  T(F): 98.3 (04 Feb 2018 08:22), Max: 98.6 (03 Feb 2018 15:41)  HR: 55 (04 Feb 2018 08:22) (53 - 68)  BP: 162/65 (04 Feb 2018 08:22) (162/65 - 189/80)  BP(mean): --  RR: 17 (04 Feb 2018 08:22) (16 - 18)  SpO2: 99% (04 Feb 2018 08:22) (96% - 100%)      PHYSICAL EXAM:    General: Well developed; well nourished; in no acute distress  HEENT: MMM, conjunctiva and sclera clear  Lungs: clear  Heart: regular  Gastrointestinal: Soft non-tender non-distended; Normal bowel sounds; No hepatosplenomegaly  Ext: ulcer dry and non foul    LABS:      CBC Full  -  ( 04 Feb 2018 06:31 )  WBC Count : 9.2 K/uL  Hemoglobin : 10.9 g/dL  Hematocrit : 32.0 %  Platelet Count - Automated : 263 K/uL  Mean Cell Volume : 92.0 fL  Mean Cell Hemoglobin : 31.3 pg  Mean Cell Hemoglobin Concentration : 34.1 g/dL  Auto Neutrophil # : x  Auto Lymphocyte # : x  Auto Monocyte # : x  Auto Eosinophil # : x  Auto Basophil # : x  Auto Neutrophil % : x  Auto Lymphocyte % : x  Auto Monocyte % : x  Auto Eosinophil % : x  Auto Basophil % : x    02-04    138  |  102  |  22  ----------------------------<  235<H>  4.7   |  22  |  1.40<H>    Ca    9.6      04 Feb 2018 06:31  Mg     2.2     02-04                        RADIOLOGY & ADDITIONAL STUDIES (The following images were personally reviewed):
Pt seen and examined Coverage for Dr. Thurman  no complaints    REVIEW OF SYSTEMS:  Constitutional: No fever, weight loss or fatigue  Cardiovascular: No chest pain, palpitations, dizziness or leg swelling  Gastrointestinal: No abdominal or epigastric pain. No nausea, vomiting or hematemesis; No diarrhea or constipation. No melena or hematochezia.  Skin: No itching, burning, rashes or lesions       MEDICATIONS:  MEDICATIONS  (STANDING):  aspirin  chewable 81 milliGRAM(s) Oral daily  atorvastatin 20 milliGRAM(s) Oral at bedtime  cefepime Injectable. 2000 milliGRAM(s) IV Push every 24 hours  dextrose 5%. 1000 milliLiter(s) (50 mL/Hr) IV Continuous <Continuous>  dextrose 50% Injectable 12.5 Gram(s) IV Push once  dextrose 50% Injectable 25 Gram(s) IV Push once  dextrose 50% Injectable 25 Gram(s) IV Push once  insulin glargine Injectable (LANTUS) 35 Unit(s) SubCutaneous at bedtime  insulin lispro (HumaLOG) corrective regimen sliding scale   SubCutaneous Before meals and at bedtime  insulin lispro Injectable (HumaLOG) 3 Unit(s) SubCutaneous three times a day before meals  metoprolol succinate  milliGRAM(s) Oral daily  metroNIDAZOLE    Tablet 500 milliGRAM(s) Oral every 8 hours  Pre Pen Benzylpenicilloyl Polylysine 1 Vial(s),Penicillin G Potassium (10,000 units/mL) 10 milliLiter(s),Histamine (1mg/ml) 0.5 milliLiter(s),0.9% Sodium Chloride vial 10 milliLiter(s) 1 Vial(s) IntraDermal once  tamsulosin 0.8 milliGRAM(s) Oral at bedtime  vancomycin  IVPB 1000 milliGRAM(s) IV Intermittent every 24 hours    MEDICATIONS  (PRN):  dextrose Gel 1 Dose(s) Oral once PRN Blood Glucose LESS THAN 70 milliGRAM(s)/deciliter  glucagon  Injectable 1 milliGRAM(s) IntraMuscular once PRN Glucose LESS THAN 70 milligrams/deciliter      Allergies    penicillin (Rash)  Penicillin Testing Negative (Unknown)    Intolerances        Vital Signs Last 24 Hrs  T(C): 36.4 (05 Feb 2018 09:09), Max: 37.1 (04 Feb 2018 20:42)  T(F): 97.5 (05 Feb 2018 09:09), Max: 98.7 (04 Feb 2018 20:42)  HR: 56 (05 Feb 2018 09:09) (52 - 58)  BP: 166/72 (05 Feb 2018 09:09) (144/73 - 166/72)  BP(mean): --  RR: 19 (05 Feb 2018 09:09) (16 - 19)  SpO2: 95% (05 Feb 2018 09:09) (95% - 99%)      PHYSICAL EXAM:    General: Well developed; well nourished; in no acute distress  HEENT: MMM, conjunctiva and sclera clear  Lungs: clear  Heart: regular  Gastrointestinal: Soft non-tender non-distended; Normal bowel sounds; No hepatosplenomegaly  Skin: Warm and dry. No obvious rash  Ext: ulcer clean and dry    LABS:      CBC Full  -  ( 04 Feb 2018 06:31 )  WBC Count : 9.2 K/uL  Hemoglobin : 10.9 g/dL  Hematocrit : 32.0 %  Platelet Count - Automated : 263 K/uL  Mean Cell Volume : 92.0 fL  Mean Cell Hemoglobin : 31.3 pg  Mean Cell Hemoglobin Concentration : 34.1 g/dL  Auto Neutrophil # : x  Auto Lymphocyte # : x  Auto Monocyte # : x  Auto Eosinophil # : x  Auto Basophil # : x  Auto Neutrophil % : x  Auto Lymphocyte % : x  Auto Monocyte % : x  Auto Eosinophil % : x  Auto Basophil % : x    02-05    136  |  102  |  23  ----------------------------<  163<H>  4.5   |  22  |  1.41<H>    Ca    9.4      05 Feb 2018 06:47  Mg     2.2     02-05                        RADIOLOGY & ADDITIONAL STUDIES (The following images were personally reviewed):< from: MR Foot No Cont, Right (02.02.18 @ 21:54) >    EXAM:  MR FOOT RT       < end of copied text >  < from: MR Foot No Cont, Right (02.02.18 @ 21:54) >  uperficial plantar medial skin ulceration from the base of the first   proximal phalanx to the IP joint without MR evidence of underlying   osteomyelitis.    Arthropathic changes at the first MTP joint. Edema like signal in the   neck of the first metatarsal may be related to arthritis. Osteomyelitis   in this region is considered unlikely.    < end of copied text >
INTERVAL HPI/OVERNIGHT EVENTS:  Patient was seen and examined at bedside. No complaints at this time. Denies pain at the site of of the ulcer R foot but now with some sensation of tension around the site of the dsg. No other concerns. No fevers/chills; no chest pain or sob, no LE edema.     VITAL SIGNS:  Vital Signs Last 24 Hrs  T(C): 36.9 (05 Feb 2018 05:07), Max: 37.1 (04 Feb 2018 20:42)  T(F): 98.5 (05 Feb 2018 05:07), Max: 98.7 (04 Feb 2018 20:42)  HR: 58 (05 Feb 2018 05:07) (52 - 58)  BP: 144/73 (05 Feb 2018 05:07) (144/73 - 162/76)  BP(mean): --  RR: 18 (05 Feb 2018 05:07) (16 - 18)  SpO2: 97% (05 Feb 2018 05:07) (95% - 99%)    PHYSICAL EXAM:    Constitutional: WDWN F in bed in NAD  Eyes: PERRL, EOMI, sclera non-icteric  Neck: supple, trachea midline, no masses, no JVD  Respiratory: CTA b/l, good air entry b/l, no wheezing, no rhonchi, no rales, without accessory muscle use and no intercostal retractions  Cardiovascular: RRR, normal S1S2, no M/R/G  Gastrointestinal: soft, NTND, no masses palpable, BS normal  Extremities: WWP; no edema/cyanosis, [id number] black/dark ulceration on bottom of right first toe nontender nonpurulent nonoozing; bunionL foot also L foot covered by dsg c/d/i  Neurological: AAOx3, CN Grossly intact; MICHAEL  Skin: Normal temperature, warm, dry; no rashes  Psych: no evidence of behavioral disturbance      MEDICATIONS  (STANDING):  aspirin  chewable 81 milliGRAM(s) Oral daily  atorvastatin 20 milliGRAM(s) Oral at bedtime  cefepime Injectable. 2000 milliGRAM(s) IV Push every 24 hours  dextrose 5%. 1000 milliLiter(s) (50 mL/Hr) IV Continuous <Continuous>  dextrose 50% Injectable 12.5 Gram(s) IV Push once  dextrose 50% Injectable 25 Gram(s) IV Push once  dextrose 50% Injectable 25 Gram(s) IV Push once  insulin glargine Injectable (LANTUS) 35 Unit(s) SubCutaneous at bedtime  insulin lispro (HumaLOG) corrective regimen sliding scale   SubCutaneous Before meals and at bedtime  insulin lispro Injectable (HumaLOG) 3 Unit(s) SubCutaneous three times a day before meals  metoprolol succinate  milliGRAM(s) Oral daily  metroNIDAZOLE    Tablet 500 milliGRAM(s) Oral every 8 hours  Pre Pen Benzylpenicilloyl Polylysine 1 Vial(s),Penicillin G Potassium (10,000 units/mL) 10 milliLiter(s),Histamine (1mg/ml) 0.5 milliLiter(s),0.9% Sodium Chloride vial 10 milliLiter(s) 1 Vial(s) IntraDermal once  tamsulosin 0.8 milliGRAM(s) Oral at bedtime  vancomycin  IVPB 1000 milliGRAM(s) IV Intermittent every 24 hours    MEDICATIONS  (PRN):  dextrose Gel 1 Dose(s) Oral once PRN Blood Glucose LESS THAN 70 milliGRAM(s)/deciliter  glucagon  Injectable 1 milliGRAM(s) IntraMuscular once PRN Glucose LESS THAN 70 milligrams/deciliter      Allergies    penicillin (Rash)  Penicillin Testing Negative (Unknown)    Intolerances        LABS:                        10.9   9.2   )-----------( 263      ( 04 Feb 2018 06:31 )             32.0     02-05    136  |  102  |  23  ----------------------------<  163<H>  4.5   |  22  |  1.41<H>    Ca    9.4      05 Feb 2018 06:47  Mg     2.2     02-05            RADIOLOGY & ADDITIONAL TESTS:

## 2018-02-06 LAB
DEPRECATED KAPPA LC FREE/LAMBDA SER: 20.31 — HIGH (ref 2.04–10.37)
KAPPA LC UR-MCNC: 465 MG/L — HIGH (ref 1.35–24.19)
LAMBDA LC UR-MCNC: 22.9 MG/L — HIGH (ref 0.24–6.66)

## 2018-02-08 DIAGNOSIS — E87.3 ALKALOSIS: ICD-10-CM

## 2018-02-08 DIAGNOSIS — I12.9 HYPERTENSIVE CHRONIC KIDNEY DISEASE WITH STAGE 1 THROUGH STAGE 4 CHRONIC KIDNEY DISEASE, OR UNSPECIFIED CHRONIC KIDNEY DISEASE: ICD-10-CM

## 2018-02-08 DIAGNOSIS — E78.00 PURE HYPERCHOLESTEROLEMIA, UNSPECIFIED: ICD-10-CM

## 2018-02-08 DIAGNOSIS — Z79.4 LONG TERM (CURRENT) USE OF INSULIN: ICD-10-CM

## 2018-02-08 DIAGNOSIS — N18.2 CHRONIC KIDNEY DISEASE, STAGE 2 (MILD): ICD-10-CM

## 2018-02-08 DIAGNOSIS — L97.519 NON-PRESSURE CHRONIC ULCER OF OTHER PART OF RIGHT FOOT WITH UNSPECIFIED SEVERITY: ICD-10-CM

## 2018-02-08 DIAGNOSIS — T50.1X5A ADVERSE EFFECT OF LOOP [HIGH-CEILING] DIURETICS, INITIAL ENCOUNTER: ICD-10-CM

## 2018-02-08 DIAGNOSIS — E11.621 TYPE 2 DIABETES MELLITUS WITH FOOT ULCER: ICD-10-CM

## 2018-02-08 DIAGNOSIS — E87.2 ACIDOSIS: ICD-10-CM

## 2018-02-08 DIAGNOSIS — L03.115 CELLULITIS OF RIGHT LOWER LIMB: ICD-10-CM

## 2018-02-08 DIAGNOSIS — E83.52 HYPERCALCEMIA: ICD-10-CM

## 2018-02-09 ENCOUNTER — APPOINTMENT (OUTPATIENT)
Dept: INFECTIOUS DISEASE | Facility: CLINIC | Age: 68
End: 2018-02-09
Payer: COMMERCIAL

## 2018-02-09 VITALS
BODY MASS INDEX: 27.56 KG/M2 | DIASTOLIC BLOOD PRESSURE: 78 MMHG | SYSTOLIC BLOOD PRESSURE: 161 MMHG | TEMPERATURE: 98.4 F | HEIGHT: 61 IN | WEIGHT: 146 LBS | OXYGEN SATURATION: 97 % | RESPIRATION RATE: 14 BRPM | HEART RATE: 60 BPM

## 2018-02-09 PROCEDURE — 99214 OFFICE O/P EST MOD 30 MIN: CPT

## 2018-02-16 ENCOUNTER — EMERGENCY (EMERGENCY)
Facility: HOSPITAL | Age: 68
LOS: 1 days | Discharge: HOME | End: 2018-02-16
Attending: EMERGENCY MEDICINE | Admitting: EMERGENCY MEDICINE
Payer: COMMERCIAL

## 2018-02-16 ENCOUNTER — APPOINTMENT (OUTPATIENT)
Dept: INFECTIOUS DISEASE | Facility: CLINIC | Age: 68
End: 2018-02-16
Payer: COMMERCIAL

## 2018-02-16 VITALS
RESPIRATION RATE: 14 BRPM | TEMPERATURE: 98 F | OXYGEN SATURATION: 99 % | HEART RATE: 60 BPM | BODY MASS INDEX: 27 KG/M2 | WEIGHT: 143 LBS | HEIGHT: 61 IN

## 2018-02-16 VITALS
WEIGHT: 145.51 LBS | OXYGEN SATURATION: 96 % | SYSTOLIC BLOOD PRESSURE: 211 MMHG | DIASTOLIC BLOOD PRESSURE: 76 MMHG | HEART RATE: 59 BPM | HEIGHT: 61 IN | TEMPERATURE: 99 F | RESPIRATION RATE: 20 BRPM

## 2018-02-16 VITALS
HEART RATE: 62 BPM | TEMPERATURE: 99 F | DIASTOLIC BLOOD PRESSURE: 75 MMHG | RESPIRATION RATE: 18 BRPM | SYSTOLIC BLOOD PRESSURE: 177 MMHG | OXYGEN SATURATION: 97 %

## 2018-02-16 VITALS — SYSTOLIC BLOOD PRESSURE: 208 MMHG | DIASTOLIC BLOOD PRESSURE: 84 MMHG

## 2018-02-16 DIAGNOSIS — I12.9 HYPERTENSIVE CHRONIC KIDNEY DISEASE WITH STAGE 1 THROUGH STAGE 4 CHRONIC KIDNEY DISEASE, OR UNSPECIFIED CHRONIC KIDNEY DISEASE: ICD-10-CM

## 2018-02-16 DIAGNOSIS — E78.5 HYPERLIPIDEMIA, UNSPECIFIED: ICD-10-CM

## 2018-02-16 DIAGNOSIS — E11.9 TYPE 2 DIABETES MELLITUS WITHOUT COMPLICATIONS: ICD-10-CM

## 2018-02-16 DIAGNOSIS — Z79.4 LONG TERM (CURRENT) USE OF INSULIN: ICD-10-CM

## 2018-02-16 DIAGNOSIS — Z79.82 LONG TERM (CURRENT) USE OF ASPIRIN: ICD-10-CM

## 2018-02-16 DIAGNOSIS — Z88.0 ALLERGY STATUS TO PENICILLIN: ICD-10-CM

## 2018-02-16 DIAGNOSIS — N18.9 CHRONIC KIDNEY DISEASE, UNSPECIFIED: ICD-10-CM

## 2018-02-16 LAB
ALBUMIN SERPL ELPH-MCNC: 4.5 G/DL — SIGNIFICANT CHANGE UP (ref 3.3–5)
ALP SERPL-CCNC: 79 U/L — SIGNIFICANT CHANGE UP (ref 40–120)
ALT FLD-CCNC: 27 U/L — SIGNIFICANT CHANGE UP (ref 10–45)
ANION GAP SERPL CALC-SCNC: 11 MMOL/L — SIGNIFICANT CHANGE UP (ref 5–17)
AST SERPL-CCNC: 28 U/L — SIGNIFICANT CHANGE UP (ref 10–40)
BASOPHILS NFR BLD AUTO: 0.4 % — SIGNIFICANT CHANGE UP (ref 0–2)
BILIRUB SERPL-MCNC: 0.4 MG/DL — SIGNIFICANT CHANGE UP (ref 0.2–1.2)
BUN SERPL-MCNC: 26 MG/DL — HIGH (ref 7–23)
CALCIUM SERPL-MCNC: 10.1 MG/DL — SIGNIFICANT CHANGE UP (ref 8.4–10.5)
CHLORIDE SERPL-SCNC: 101 MMOL/L — SIGNIFICANT CHANGE UP (ref 96–108)
CO2 SERPL-SCNC: 28 MMOL/L — SIGNIFICANT CHANGE UP (ref 22–31)
CREAT SERPL-MCNC: 1.36 MG/DL — HIGH (ref 0.5–1.3)
EOSINOPHIL NFR BLD AUTO: 3.5 % — SIGNIFICANT CHANGE UP (ref 0–6)
GLUCOSE SERPL-MCNC: 179 MG/DL — HIGH (ref 70–99)
HCT VFR BLD CALC: 33 % — LOW (ref 34.5–45)
HGB BLD-MCNC: 11.5 G/DL — SIGNIFICANT CHANGE UP (ref 11.5–15.5)
LYMPHOCYTES # BLD AUTO: 27.2 % — SIGNIFICANT CHANGE UP (ref 13–44)
MCHC RBC-ENTMCNC: 31.9 PG — SIGNIFICANT CHANGE UP (ref 27–34)
MCHC RBC-ENTMCNC: 34.8 G/DL — SIGNIFICANT CHANGE UP (ref 32–36)
MCV RBC AUTO: 91.7 FL — SIGNIFICANT CHANGE UP (ref 80–100)
MONOCYTES NFR BLD AUTO: 6 % — SIGNIFICANT CHANGE UP (ref 2–14)
NEUTROPHILS NFR BLD AUTO: 62.9 % — SIGNIFICANT CHANGE UP (ref 43–77)
PLATELET # BLD AUTO: 284 K/UL — SIGNIFICANT CHANGE UP (ref 150–400)
POTASSIUM SERPL-MCNC: 4.7 MMOL/L — SIGNIFICANT CHANGE UP (ref 3.5–5.3)
POTASSIUM SERPL-SCNC: 4.7 MMOL/L — SIGNIFICANT CHANGE UP (ref 3.5–5.3)
PROT SERPL-MCNC: 8.5 G/DL — HIGH (ref 6–8.3)
RBC # BLD: 3.6 M/UL — LOW (ref 3.8–5.2)
RBC # FLD: 12.3 % — SIGNIFICANT CHANGE UP (ref 10.3–16.9)
SODIUM SERPL-SCNC: 140 MMOL/L — SIGNIFICANT CHANGE UP (ref 135–145)
WBC # BLD: 10.5 K/UL — SIGNIFICANT CHANGE UP (ref 3.8–10.5)
WBC # FLD AUTO: 10.5 K/UL — SIGNIFICANT CHANGE UP (ref 3.8–10.5)

## 2018-02-16 PROCEDURE — 99284 EMERGENCY DEPT VISIT MOD MDM: CPT | Mod: 25

## 2018-02-16 PROCEDURE — 85025 COMPLETE CBC W/AUTO DIFF WBC: CPT

## 2018-02-16 PROCEDURE — 36415 COLL VENOUS BLD VENIPUNCTURE: CPT

## 2018-02-16 PROCEDURE — 99214 OFFICE O/P EST MOD 30 MIN: CPT

## 2018-02-16 PROCEDURE — 80053 COMPREHEN METABOLIC PANEL: CPT

## 2018-02-16 PROCEDURE — 93010 ELECTROCARDIOGRAM REPORT: CPT

## 2018-02-16 PROCEDURE — 93005 ELECTROCARDIOGRAM TRACING: CPT

## 2018-02-16 PROCEDURE — 99283 EMERGENCY DEPT VISIT LOW MDM: CPT | Mod: 25

## 2018-02-16 RX ORDER — AMLODIPINE BESYLATE 2.5 MG/1
2.5 TABLET ORAL ONCE
Qty: 0 | Refills: 0 | Status: COMPLETED | OUTPATIENT
Start: 2018-02-16 | End: 2018-02-16

## 2018-02-16 RX ORDER — CEFPODOXIME PROXETIL 200 MG/1
200 TABLET, FILM COATED ORAL TWICE DAILY
Refills: 0 | Status: COMPLETED | COMMUNITY

## 2018-02-16 RX ORDER — DOXAZOSIN MESYLATE 4 MG
4 TABLET ORAL ONCE
Qty: 0 | Refills: 0 | Status: COMPLETED | OUTPATIENT
Start: 2018-02-16 | End: 2018-02-16

## 2018-02-16 RX ORDER — DOXYCYCLINE HYCLATE 100 MG/1
100 CAPSULE ORAL
Refills: 0 | Status: COMPLETED | COMMUNITY

## 2018-02-16 RX ADMIN — AMLODIPINE BESYLATE 2.5 MILLIGRAM(S): 2.5 TABLET ORAL at 17:35

## 2018-02-16 RX ADMIN — Medication 4 MILLIGRAM(S): at 19:20

## 2018-02-16 RX ADMIN — AMLODIPINE BESYLATE 2.5 MILLIGRAM(S): 2.5 TABLET ORAL at 16:18

## 2018-02-16 NOTE — ED ADULT TRIAGE NOTE - CHIEF COMPLAINT QUOTE
Patient was sent by PMD for elevated BP noticed at the doctor's office today . Denies any headache , dizziness nor blurry vision .

## 2018-02-16 NOTE — ED ADULT NURSE NOTE - CHPI ED SYMPTOMS NEG
no dizziness/no fever/no cough/no vomiting/no chills/no syncope/no back pain/no chest pain/no nausea/no shortness of breath/no diaphoresis

## 2018-02-16 NOTE — ED ADULT NURSE NOTE - OBJECTIVE STATEMENT
Pt states she was at her doctors office and was told to come to ER for high blood pressure. Pt denies sob, chest pain, dizziness, weakness, blurry vision, headache.

## 2018-02-16 NOTE — ED PROVIDER NOTE - OBJECTIVE STATEMENT
66 yo F with hx of CKD- HTN- DM states she had her BP checked 2 hr ago at home was elevated -190/100 -  no CP or sob-  missed her Terazozin last nite  no headache or dizziness - no vomiting no fatigue or flu like sx

## 2018-02-16 NOTE — ED PROVIDER NOTE - MEDICAL DECISION MAKING DETAILS
67 F with elevated BP well appearing hx of CKD missed 1 dose of Trazosin last nite  will recheck BP  possible CCB vs dose of terazosin will dw  dr aguilar

## 2018-02-20 ENCOUNTER — OTHER (OUTPATIENT)
Age: 68
End: 2018-02-20

## 2018-03-27 ENCOUNTER — APPOINTMENT (OUTPATIENT)
Dept: INFECTIOUS DISEASE | Facility: CLINIC | Age: 68
End: 2018-03-27
Payer: COMMERCIAL

## 2018-03-27 VITALS
WEIGHT: 140 LBS | TEMPERATURE: 97.3 F | RESPIRATION RATE: 14 BRPM | SYSTOLIC BLOOD PRESSURE: 198 MMHG | OXYGEN SATURATION: 98 % | HEART RATE: 60 BPM | HEIGHT: 61 IN | BODY MASS INDEX: 26.43 KG/M2 | DIASTOLIC BLOOD PRESSURE: 89 MMHG

## 2018-03-27 PROCEDURE — 99214 OFFICE O/P EST MOD 30 MIN: CPT

## 2018-03-27 RX ORDER — CEFPODOXIME PROXETIL 200 MG/1
200 TABLET, FILM COATED ORAL
Qty: 28 | Refills: 0 | Status: ACTIVE | COMMUNITY
Start: 2018-03-27 | End: 1900-01-01

## 2018-04-03 ENCOUNTER — APPOINTMENT (OUTPATIENT)
Dept: INFECTIOUS DISEASE | Facility: CLINIC | Age: 68
End: 2018-04-03
Payer: COMMERCIAL

## 2018-04-03 VITALS
DIASTOLIC BLOOD PRESSURE: 78 MMHG | HEIGHT: 61 IN | RESPIRATION RATE: 14 BRPM | SYSTOLIC BLOOD PRESSURE: 170 MMHG | WEIGHT: 142 LBS | TEMPERATURE: 97.8 F | HEART RATE: 59 BPM | OXYGEN SATURATION: 98 % | BODY MASS INDEX: 26.81 KG/M2

## 2018-04-03 DIAGNOSIS — L03.115 CELLULITIS OF RIGHT LOWER LIMB: ICD-10-CM

## 2018-04-03 PROCEDURE — 99213 OFFICE O/P EST LOW 20 MIN: CPT

## 2020-01-07 NOTE — H&P ADULT - PROBLEM SELECTOR PLAN 1
Appeared well healed, no exudate or discharge, no pain, Xray showed no bone involvement, ESR is elevated however not near osteo range. Likely mild-moderate foot ulcer, no systemic involvement.   -Due to DM, would cover pseudomonas for now, will start Cefapime (rash to amoxicillin 25 years ago, low cross activity), will hold if rash develop. Would benefit from Pennicillin testing in the AM.  -Continue with vancomycin renally dosed.  -Will f/u with podiatry for recs, would deescalate abx pending blood culture, wound is clean so doubt culture can be obtained. Yes

## 2020-10-08 ENCOUNTER — APPOINTMENT (OUTPATIENT)
Dept: MRI IMAGING | Facility: IMAGING CENTER | Age: 70
End: 2020-10-08

## 2023-06-28 ENCOUNTER — TRANSCRIPTION ENCOUNTER (OUTPATIENT)
Age: 73
End: 2023-06-28

## 2023-06-29 ENCOUNTER — TRANSCRIPTION ENCOUNTER (OUTPATIENT)
Age: 73
End: 2023-06-29

## 2023-06-29 ENCOUNTER — INPATIENT (INPATIENT)
Facility: HOSPITAL | Age: 73
LOS: 2 days | Discharge: HOME | DRG: 661 | End: 2023-07-02
Attending: INTERNAL MEDICINE | Admitting: INTERNAL MEDICINE
Payer: MEDICARE

## 2023-06-29 VITALS
OXYGEN SATURATION: 97 % | SYSTOLIC BLOOD PRESSURE: 219 MMHG | DIASTOLIC BLOOD PRESSURE: 77 MMHG | HEIGHT: 61 IN | WEIGHT: 154.98 LBS | HEART RATE: 64 BPM | RESPIRATION RATE: 16 BRPM

## 2023-06-29 DIAGNOSIS — I10 ESSENTIAL (PRIMARY) HYPERTENSION: ICD-10-CM

## 2023-06-29 DIAGNOSIS — Z29.9 ENCOUNTER FOR PROPHYLACTIC MEASURES, UNSPECIFIED: ICD-10-CM

## 2023-06-29 DIAGNOSIS — N12 TUBULO-INTERSTITIAL NEPHRITIS, NOT SPECIFIED AS ACUTE OR CHRONIC: ICD-10-CM

## 2023-06-29 DIAGNOSIS — E87.5 HYPERKALEMIA: ICD-10-CM

## 2023-06-29 DIAGNOSIS — N17.9 ACUTE KIDNEY FAILURE, UNSPECIFIED: ICD-10-CM

## 2023-06-29 DIAGNOSIS — N20.0 CALCULUS OF KIDNEY: ICD-10-CM

## 2023-06-29 DIAGNOSIS — E11.9 TYPE 2 DIABETES MELLITUS WITHOUT COMPLICATIONS: ICD-10-CM

## 2023-06-29 DIAGNOSIS — Z01.818 ENCOUNTER FOR OTHER PREPROCEDURAL EXAMINATION: ICD-10-CM

## 2023-06-29 PROBLEM — N18.9 CHRONIC KIDNEY DISEASE, UNSPECIFIED: Chronic | Status: ACTIVE | Noted: 2018-01-31

## 2023-06-29 LAB
ALBUMIN SERPL ELPH-MCNC: 3.9 G/DL — SIGNIFICANT CHANGE UP (ref 3.5–5)
ALP SERPL-CCNC: 82 U/L — SIGNIFICANT CHANGE UP (ref 40–120)
ALT FLD-CCNC: 45 U/L DA — SIGNIFICANT CHANGE UP (ref 10–60)
ANION GAP SERPL CALC-SCNC: 8 MMOL/L — SIGNIFICANT CHANGE UP (ref 5–17)
ANION GAP SERPL CALC-SCNC: 8 MMOL/L — SIGNIFICANT CHANGE UP (ref 5–17)
APPEARANCE UR: CLEAR — SIGNIFICANT CHANGE UP
APTT BLD: 25.1 SEC — LOW (ref 27.5–35.5)
AST SERPL-CCNC: 30 U/L — SIGNIFICANT CHANGE UP (ref 10–40)
BACTERIA # UR AUTO: ABNORMAL /HPF
BASOPHILS # BLD AUTO: 0.07 K/UL — SIGNIFICANT CHANGE UP (ref 0–0.2)
BASOPHILS NFR BLD AUTO: 0.5 % — SIGNIFICANT CHANGE UP (ref 0–2)
BILIRUB SERPL-MCNC: 0.5 MG/DL — SIGNIFICANT CHANGE UP (ref 0.2–1.2)
BILIRUB UR-MCNC: NEGATIVE — SIGNIFICANT CHANGE UP
BLD GP AB SCN SERPL QL: SIGNIFICANT CHANGE UP
BUN SERPL-MCNC: 50 MG/DL — HIGH (ref 7–18)
BUN SERPL-MCNC: 54 MG/DL — HIGH (ref 7–18)
CALCIUM SERPL-MCNC: 9.5 MG/DL — SIGNIFICANT CHANGE UP (ref 8.4–10.5)
CALCIUM SERPL-MCNC: 9.8 MG/DL — SIGNIFICANT CHANGE UP (ref 8.4–10.5)
CHLORIDE SERPL-SCNC: 105 MMOL/L — SIGNIFICANT CHANGE UP (ref 96–108)
CHLORIDE SERPL-SCNC: 107 MMOL/L — SIGNIFICANT CHANGE UP (ref 96–108)
CO2 SERPL-SCNC: 21 MMOL/L — LOW (ref 22–31)
CO2 SERPL-SCNC: 23 MMOL/L — SIGNIFICANT CHANGE UP (ref 22–31)
COLOR SPEC: YELLOW — SIGNIFICANT CHANGE UP
CREAT SERPL-MCNC: 2.64 MG/DL — HIGH (ref 0.5–1.3)
CREAT SERPL-MCNC: 2.93 MG/DL — HIGH (ref 0.5–1.3)
DIFF PNL FLD: ABNORMAL
EGFR: 16 ML/MIN/1.73M2 — LOW
EGFR: 19 ML/MIN/1.73M2 — LOW
EOSINOPHIL # BLD AUTO: 0.02 K/UL — SIGNIFICANT CHANGE UP (ref 0–0.5)
EOSINOPHIL NFR BLD AUTO: 0.2 % — SIGNIFICANT CHANGE UP (ref 0–6)
EPI CELLS # UR: ABNORMAL /HPF
GLUCOSE BLDC GLUCOMTR-MCNC: 240 MG/DL — HIGH (ref 70–99)
GLUCOSE BLDC GLUCOMTR-MCNC: 289 MG/DL — HIGH (ref 70–99)
GLUCOSE BLDC GLUCOMTR-MCNC: 336 MG/DL — HIGH (ref 70–99)
GLUCOSE SERPL-MCNC: 382 MG/DL — HIGH (ref 70–99)
GLUCOSE SERPL-MCNC: 423 MG/DL — HIGH (ref 70–99)
GLUCOSE UR QL: 1000 MG/DL
HCT VFR BLD CALC: 29.4 % — LOW (ref 34.5–45)
HGB BLD-MCNC: 10.1 G/DL — LOW (ref 11.5–15.5)
IMM GRANULOCYTES NFR BLD AUTO: 0.6 % — SIGNIFICANT CHANGE UP (ref 0–0.9)
INR BLD: 1.11 RATIO — SIGNIFICANT CHANGE UP (ref 0.88–1.16)
KETONES UR-MCNC: NEGATIVE — SIGNIFICANT CHANGE UP
LEUKOCYTE ESTERASE UR-ACNC: NEGATIVE — SIGNIFICANT CHANGE UP
LIDOCAIN IGE QN: 86 U/L — SIGNIFICANT CHANGE UP (ref 73–393)
LYMPHOCYTES # BLD AUTO: 1.46 K/UL — SIGNIFICANT CHANGE UP (ref 1–3.3)
LYMPHOCYTES # BLD AUTO: 11.2 % — LOW (ref 13–44)
MCHC RBC-ENTMCNC: 32.9 PG — SIGNIFICANT CHANGE UP (ref 27–34)
MCHC RBC-ENTMCNC: 34.4 GM/DL — SIGNIFICANT CHANGE UP (ref 32–36)
MCV RBC AUTO: 95.8 FL — SIGNIFICANT CHANGE UP (ref 80–100)
MONOCYTES # BLD AUTO: 0.42 K/UL — SIGNIFICANT CHANGE UP (ref 0–0.9)
MONOCYTES NFR BLD AUTO: 3.2 % — SIGNIFICANT CHANGE UP (ref 2–14)
NEUTROPHILS # BLD AUTO: 10.95 K/UL — HIGH (ref 1.8–7.4)
NEUTROPHILS NFR BLD AUTO: 84.3 % — HIGH (ref 43–77)
NITRITE UR-MCNC: NEGATIVE — SIGNIFICANT CHANGE UP
NRBC # BLD: 0 /100 WBCS — SIGNIFICANT CHANGE UP (ref 0–0)
PH UR: 5 — SIGNIFICANT CHANGE UP (ref 5–8)
PLATELET # BLD AUTO: 204 K/UL — SIGNIFICANT CHANGE UP (ref 150–400)
POTASSIUM SERPL-MCNC: 4.8 MMOL/L — SIGNIFICANT CHANGE UP (ref 3.5–5.3)
POTASSIUM SERPL-MCNC: 5.8 MMOL/L — HIGH (ref 3.5–5.3)
POTASSIUM SERPL-SCNC: 4.8 MMOL/L — SIGNIFICANT CHANGE UP (ref 3.5–5.3)
POTASSIUM SERPL-SCNC: 5.8 MMOL/L — HIGH (ref 3.5–5.3)
PROT SERPL-MCNC: 8.3 G/DL — SIGNIFICANT CHANGE UP (ref 6–8.3)
PROT UR-MCNC: 100 MG/DL
PROTHROM AB SERPL-ACNC: 13.2 SEC — SIGNIFICANT CHANGE UP (ref 10.5–13.4)
RBC # BLD: 3.07 M/UL — LOW (ref 3.8–5.2)
RBC # FLD: 12.6 % — SIGNIFICANT CHANGE UP (ref 10.3–14.5)
RBC CASTS # UR COMP ASSIST: ABNORMAL /HPF (ref 0–2)
SODIUM SERPL-SCNC: 134 MMOL/L — LOW (ref 135–145)
SODIUM SERPL-SCNC: 138 MMOL/L — SIGNIFICANT CHANGE UP (ref 135–145)
SP GR SPEC: 1.01 — SIGNIFICANT CHANGE UP (ref 1.01–1.02)
UROBILINOGEN FLD QL: NEGATIVE — SIGNIFICANT CHANGE UP
WBC # BLD: 13 K/UL — HIGH (ref 3.8–10.5)
WBC # FLD AUTO: 13 K/UL — HIGH (ref 3.8–10.5)
WBC UR QL: SIGNIFICANT CHANGE UP /HPF (ref 0–5)

## 2023-06-29 PROCEDURE — 52332 CYSTOSCOPY AND TREATMENT: CPT | Mod: LT

## 2023-06-29 PROCEDURE — 74176 CT ABD & PELVIS W/O CONTRAST: CPT | Mod: 26,MA

## 2023-06-29 PROCEDURE — 93010 ELECTROCARDIOGRAM REPORT: CPT | Mod: 76

## 2023-06-29 PROCEDURE — 99285 EMERGENCY DEPT VISIT HI MDM: CPT

## 2023-06-29 PROCEDURE — 74420 UROGRAPHY RTRGR +-KUB: CPT | Mod: 26

## 2023-06-29 PROCEDURE — 99223 1ST HOSP IP/OBS HIGH 75: CPT | Mod: 25

## 2023-06-29 DEVICE — URETERAL STENT PERCUFLEX PLUS 6FR 24CM: Type: IMPLANTABLE DEVICE | Site: LEFT | Status: FUNCTIONAL

## 2023-06-29 DEVICE — GUIDEWIRE SENSOR DUAL-FLEX NITINOL STRAIGHT .038" X 150CM: Type: IMPLANTABLE DEVICE | Site: LEFT | Status: FUNCTIONAL

## 2023-06-29 DEVICE — CATH ANG C2 4FRX65CM: Type: IMPLANTABLE DEVICE | Site: LEFT | Status: FUNCTIONAL

## 2023-06-29 DEVICE — IMPLANTABLE DEVICE: Type: IMPLANTABLE DEVICE | Site: LEFT | Status: FUNCTIONAL

## 2023-06-29 DEVICE — STENT URETHRAL PIGTL DBL 6FRX22CM: Type: IMPLANTABLE DEVICE | Site: LEFT | Status: FUNCTIONAL

## 2023-06-29 RX ORDER — SODIUM CHLORIDE 9 MG/ML
1000 INJECTION, SOLUTION INTRAVENOUS
Refills: 0 | Status: DISCONTINUED | OUTPATIENT
Start: 2023-06-29 | End: 2023-06-29

## 2023-06-29 RX ORDER — MORPHINE SULFATE 50 MG/1
2 CAPSULE, EXTENDED RELEASE ORAL ONCE
Refills: 0 | Status: DISCONTINUED | OUTPATIENT
Start: 2023-06-29 | End: 2023-06-29

## 2023-06-29 RX ORDER — DEXTROSE 50 % IN WATER 50 %
50 SYRINGE (ML) INTRAVENOUS ONCE
Refills: 0 | Status: COMPLETED | OUTPATIENT
Start: 2023-06-29 | End: 2023-06-29

## 2023-06-29 RX ORDER — ACETAMINOPHEN 500 MG
1000 TABLET ORAL ONCE
Refills: 0 | Status: COMPLETED | OUTPATIENT
Start: 2023-06-29 | End: 2023-06-29

## 2023-06-29 RX ORDER — SODIUM CHLORIDE 9 MG/ML
1000 INJECTION, SOLUTION INTRAVENOUS
Refills: 0 | Status: DISCONTINUED | OUTPATIENT
Start: 2023-06-29 | End: 2023-06-30

## 2023-06-29 RX ORDER — LOSARTAN POTASSIUM 100 MG/1
50 TABLET, FILM COATED ORAL ONCE
Refills: 0 | Status: COMPLETED | OUTPATIENT
Start: 2023-06-29 | End: 2023-06-29

## 2023-06-29 RX ORDER — ONDANSETRON 8 MG/1
4 TABLET, FILM COATED ORAL ONCE
Refills: 0 | Status: DISCONTINUED | OUTPATIENT
Start: 2023-06-29 | End: 2023-06-29

## 2023-06-29 RX ORDER — INSULIN LISPRO 100/ML
VIAL (ML) SUBCUTANEOUS
Refills: 0 | Status: DISCONTINUED | OUTPATIENT
Start: 2023-06-29 | End: 2023-07-02

## 2023-06-29 RX ORDER — DEXTROSE 50 % IN WATER 50 %
25 SYRINGE (ML) INTRAVENOUS ONCE
Refills: 0 | Status: DISCONTINUED | OUTPATIENT
Start: 2023-06-29 | End: 2023-06-30

## 2023-06-29 RX ORDER — ONDANSETRON 8 MG/1
4 TABLET, FILM COATED ORAL EVERY 8 HOURS
Refills: 0 | Status: DISCONTINUED | OUTPATIENT
Start: 2023-06-29 | End: 2023-07-02

## 2023-06-29 RX ORDER — SODIUM ZIRCONIUM CYCLOSILICATE 10 G/10G
10 POWDER, FOR SUSPENSION ORAL ONCE
Refills: 0 | Status: COMPLETED | OUTPATIENT
Start: 2023-06-29 | End: 2023-06-29

## 2023-06-29 RX ORDER — SODIUM POLYSTYRENE SULFONATE 4.1 MEQ/G
15 POWDER, FOR SUSPENSION ORAL ONCE
Refills: 0 | Status: COMPLETED | OUTPATIENT
Start: 2023-06-29 | End: 2023-06-29

## 2023-06-29 RX ORDER — MEROPENEM 1 G/30ML
500 INJECTION INTRAVENOUS EVERY 12 HOURS
Refills: 0 | Status: COMPLETED | OUTPATIENT
Start: 2023-06-29 | End: 2023-07-02

## 2023-06-29 RX ORDER — SODIUM CHLORIDE 9 MG/ML
1000 INJECTION INTRAMUSCULAR; INTRAVENOUS; SUBCUTANEOUS
Refills: 0 | Status: DISCONTINUED | OUTPATIENT
Start: 2023-06-29 | End: 2023-07-01

## 2023-06-29 RX ORDER — CALCIUM GLUCONATE 100 MG/ML
1 VIAL (ML) INTRAVENOUS ONCE
Refills: 0 | Status: COMPLETED | OUTPATIENT
Start: 2023-06-29 | End: 2023-06-29

## 2023-06-29 RX ORDER — DEXTROSE 50 % IN WATER 50 %
12.5 SYRINGE (ML) INTRAVENOUS ONCE
Refills: 0 | Status: DISCONTINUED | OUTPATIENT
Start: 2023-06-29 | End: 2023-06-30

## 2023-06-29 RX ORDER — SODIUM CHLORIDE 9 MG/ML
1000 INJECTION INTRAMUSCULAR; INTRAVENOUS; SUBCUTANEOUS ONCE
Refills: 0 | Status: COMPLETED | OUTPATIENT
Start: 2023-06-29 | End: 2023-06-29

## 2023-06-29 RX ORDER — INSULIN HUMAN 100 [IU]/ML
10 INJECTION, SOLUTION SUBCUTANEOUS ONCE
Refills: 0 | Status: COMPLETED | OUTPATIENT
Start: 2023-06-29 | End: 2023-06-29

## 2023-06-29 RX ORDER — DEXTROSE 50 % IN WATER 50 %
15 SYRINGE (ML) INTRAVENOUS ONCE
Refills: 0 | Status: DISCONTINUED | OUTPATIENT
Start: 2023-06-29 | End: 2023-06-30

## 2023-06-29 RX ORDER — GLUCAGON INJECTION, SOLUTION 0.5 MG/.1ML
1 INJECTION, SOLUTION SUBCUTANEOUS ONCE
Refills: 0 | Status: DISCONTINUED | OUTPATIENT
Start: 2023-06-29 | End: 2023-06-30

## 2023-06-29 RX ORDER — FENTANYL CITRATE 50 UG/ML
25 INJECTION INTRAVENOUS
Refills: 0 | Status: DISCONTINUED | OUTPATIENT
Start: 2023-06-29 | End: 2023-06-29

## 2023-06-29 RX ADMIN — SODIUM CHLORIDE 1000 MILLILITER(S): 9 INJECTION INTRAMUSCULAR; INTRAVENOUS; SUBCUTANEOUS at 14:40

## 2023-06-29 RX ADMIN — Medication 1000 MILLIGRAM(S): at 11:10

## 2023-06-29 RX ADMIN — MORPHINE SULFATE 2 MILLIGRAM(S): 50 CAPSULE, EXTENDED RELEASE ORAL at 12:38

## 2023-06-29 RX ADMIN — SODIUM POLYSTYRENE SULFONATE 15 GRAM(S): 4.1 POWDER, FOR SUSPENSION ORAL at 14:55

## 2023-06-29 RX ADMIN — MORPHINE SULFATE 2 MILLIGRAM(S): 50 CAPSULE, EXTENDED RELEASE ORAL at 13:08

## 2023-06-29 RX ADMIN — SODIUM ZIRCONIUM CYCLOSILICATE 10 GRAM(S): 10 POWDER, FOR SUSPENSION ORAL at 14:41

## 2023-06-29 RX ADMIN — INSULIN HUMAN 10 UNIT(S): 100 INJECTION, SOLUTION SUBCUTANEOUS at 14:41

## 2023-06-29 RX ADMIN — Medication 3: at 20:02

## 2023-06-29 RX ADMIN — MEROPENEM 100 MILLIGRAM(S): 1 INJECTION INTRAVENOUS at 23:00

## 2023-06-29 RX ADMIN — Medication 100 GRAM(S): at 14:40

## 2023-06-29 RX ADMIN — Medication 1000 MILLIGRAM(S): at 11:25

## 2023-06-29 RX ADMIN — LOSARTAN POTASSIUM 50 MILLIGRAM(S): 100 TABLET, FILM COATED ORAL at 10:56

## 2023-06-29 RX ADMIN — Medication 400 MILLIGRAM(S): at 10:55

## 2023-06-29 RX ADMIN — Medication 50 MILLILITER(S): at 14:40

## 2023-06-29 RX ADMIN — SODIUM CHLORIDE 100 MILLILITER(S): 9 INJECTION INTRAMUSCULAR; INTRAVENOUS; SUBCUTANEOUS at 23:00

## 2023-06-29 NOTE — H&P ADULT - PROBLEM SELECTOR PLAN 6
h/o HTN on metoprolol, clonidine, terazosin   will hold lasix for ERIKA, currently hypovolemic  c/w home meds with holding parameters   Monitor BP h/o HTN on Toprol 50mg, Clonidine 0.1mg BID?TID, Terazosin 10mg, Losartan, and Lasix  PRIMARY TEAM TO CONFIRM MEDS IN THE AM  will hold lasix and Losartan for ERIKA, currently hypovolemic  c/w home meds with holding parameters once indicated, currently post-procedure is normotensive   Monitor BP  Nephrology consulted: Dr. Rowell   - while NPO can give Metoprolol 10mg IV q 8hrs ATC  (hold if SBP <140 or HR <65) and Hydralazine 5mg IV q6hrs prn SBP >160.

## 2023-06-29 NOTE — H&P ADULT - NSHPPHYSICALEXAM_GEN_ALL_CORE
Vital Signs Last 24 Hrs  T(C): 36.9 (29 Jun 2023 10:32), Max: 36.9 (29 Jun 2023 10:32)  T(F): 98.4 (29 Jun 2023 10:32), Max: 98.4 (29 Jun 2023 10:32)  HR: 64 (29 Jun 2023 10:32) (64 - 64)  BP: 218/90 (29 Jun 2023 10:32) (218/90 - 219/77)  BP(mean): --  RR: 18 (29 Jun 2023 10:32) (16 - 18)  SpO2: 96% (29 Jun 2023 10:32) (96% - 97%)    Parameters below as of 29 Jun 2023 10:32  Patient On (Oxygen Delivery Method): room air    GENERAL: NAD, lying in bed comfortably  HEAD:  Atraumatic, Normocephalic  EYES: EOMI, PERRLA, conjunctiva and sclera clear  ENT: Moist mucous membranes  NECK: Supple, No JVD  CHEST/LUNG: Clear to auscultation bilaterally; No rales, rhonchi, wheezing, or rubs. Unlabored respirations  HEART: Regular rate and rhythm; No murmurs, rubs, or gallops  ABDOMEN: Bowel sounds present; Soft, Nontender, Nondistended. No hepatomegally  EXTREMITIES:  2+ Peripheral Pulses, brisk capillary refill. No clubbing, cyanosis, or edema  NERVOUS SYSTEM:  Alert & Oriented X3, speech clear. No deficits   MSK: FROM all 4 extremities, full and equal strength  SKIN: No rashes or lesions Vital Signs Last 24 Hrs  T(C): 36.9 (29 Jun 2023 10:32), Max: 36.9 (29 Jun 2023 10:32)  T(F): 98.4 (29 Jun 2023 10:32), Max: 98.4 (29 Jun 2023 10:32)  HR: 64 (29 Jun 2023 10:32) (64 - 64)  BP: 218/90 (29 Jun 2023 10:32) (218/90 - 219/77)  BP(mean): --  RR: 18 (29 Jun 2023 10:32) (16 - 18)  SpO2: 96% (29 Jun 2023 10:32) (96% - 97%)    Parameters below as of 29 Jun 2023 10:32  Patient On (Oxygen Delivery Method): room air    GENERAL: NAD, lying in bed comfortably  HEAD:  Atraumatic, Normocephalic  EYES: EOMI, PERRLA, conjunctiva and sclera clear  ENT: Moist mucous membranes  NECK: Supple, No JVD  CHEST/LUNG: Clear to auscultation bilaterally; No rales, rhonchi, wheezing, or rubs. Unlabored respirations  HEART: Regular rate and rhythm; No murmurs, rubs, or gallops  ABDOMEN: Bowel sounds present; Soft, Nontender, (+) distended, chronic   EXTREMITIES:  2+ Peripheral Pulses, brisk capillary refill. No clubbing, cyanosis, or edema  NERVOUS SYSTEM:  Alert & Oriented X3, speech clear. No deficits   MSK: FROM all 4 extremities, full and equal strength  SKIN: No rashes or lesions

## 2023-06-29 NOTE — H&P ADULT - PROBLEM SELECTOR PLAN 2
no known cardiac hx  EKG: ORDERED x2, getting now   Pre-op labs ordered, testing     Cesar: 1.2% risk of MI, cardiac arrest intraoperatively or up to 30 days post- op  RCRI: 2-points, Class III risk 10% 30-d risk of death, MI, or cardiac arrest     Patient is low to intermediate risk for an urgent low risk procedure.  Planned for urgent cystoscopy with Dr. Emanuel plan as above

## 2023-06-29 NOTE — BRIEF OPERATIVE NOTE - NSICDXBRIEFPROCEDURE_GEN_ALL_CORE_FT
PROCEDURES:  Cystoscopy with stent placement 29-Jun-2023 17:02:01 retrograde pyelogram Almaz Mendoza

## 2023-06-29 NOTE — ED ADULT NURSE NOTE - CCCP TRG CHIEF CMPLNT
Mu Garcia is a 11 year old male presenting with sore throat for a few weeks per pt.  He was checked for strep throat on 12/10/19 which was negative.  It has not resolved.  Last week he had a fever which has resolved.  He is coughing.        He is also c/o constipation for \"a while\".  He saw his PCP last week Tuesday.  He was diagnosed with constipation.  He has been taking miralax daily with no improvement.  LBM:4 days ago      Medications reviewed and updated.  Denies known Latex allergy or symptoms of Latex sensitivity.  Allergies and tobacco reviewed.    Drea Bill MD    Patient would like communication of their results via:        Cell Phone:   [phone] (dad).  We may leave a message there per dad.         left since yesterday/flank pain

## 2023-06-29 NOTE — ED ADULT NURSE NOTE - OBJECTIVE STATEMENT
Pt arrived from home, via EMS for c/o Lt flank pain and elevated BP   Denies urinary symptoms, fever, chills

## 2023-06-29 NOTE — H&P ADULT - PROBLEM SELECTOR PLAN 3
no known cardiac hx  EKG: ORDERED x2, getting now   Pre-op labs ordered, testing     Cesar: 1.2% risk of MI, cardiac arrest intraoperatively or up to 30 days post- op  RCRI: 2-points, Class III risk 10% 30-d risk of death, MI, or cardiac arrest     Patient is low to intermediate risk for an urgent low risk procedure.  Planned for urgent cystoscopy with Dr. Emanuel no known cardiac hx  EKG: NSR x2  Pre-op labs ordered, testing     Cesar: 1.2% risk of MI, cardiac arrest intraoperatively or up to 30 days post- op  RCRI: 2-points, Class III risk 10% 30-d risk of death, MI, or cardiac arrest     Patient is low to intermediate risk for an urgent low risk procedure.  Planned for urgent cystoscopy with Dr. Emanuel

## 2023-06-29 NOTE — CONSULT NOTE ADULT - ASSESSMENT
Patient is a 74yo Female with CKD, DM, HTN, HLD  presents with left flank pain x 2 days. Pt a/w ERIKA on CKD, Hyperkalemia and mild left hydro  2/2 obstructed stone. Nephrology consulted for Elevated serum creatinine.    1. ERIKA- Likely hemodynamically elevated with Lasix/ ?Losartan use. UA with 100 protein and mod blood; microscopic hematuria likely due to stones. Check urine lytes. Avoid LR IV due to hyperkalemia. Will give NS @ 100ml/hr instead. CT abd/pelvis with mild Left hydro secondary to a 5 mm proximal to mid left ureteral calculus. Additional nonobstructive left nephrolithiasis.   Unilateral hydro does not cause ERIKA. Strict I/Os. Avoid nephrotoxins/ NSAIDs/ RCA. Monitor BMP.  2. CKD-3b- pt states her last SCr was 1.8. Pt with presumed diabetic/ hypertensive nephropathy for which pt follows with Dr. Gage Mayorga. Attempted to call [phone] for baseline SCr; office currently closed. Avoid nephrotoxins  3. Hypertensive urgency- elevated BP likely due to pain/ rebound from clonidine. Pt now NPO. Recc Metoprolol 10mg IV q 8hrs ATC  (hold if SBP <140 or HR <65) and Hydralazine 5mg IV q6hrs prn SBP >160. Monitor BP  4. Hyperkalemia- resolved s/p Lokelma 10g PO x1. Please avoid LR IVF. Recc low potassium diet. Monitor serum potassium  5. Left hydro 2/2 obstructed stone; mild hydro, pt for L stent placement with Dr. Emanuel today 6/29. Urology following.         [facility]  Zhou Mijares M.D.  Mando Hein D.O.  Claudia Krause M.D.  Rand George, GERMÁN, ANP-C  [phone]  Fax: [phone] [place] [address], [address]  [place] [address]

## 2023-06-29 NOTE — CHART NOTE - NSCHARTNOTEFT_GEN_A_CORE
Pt POD 0 s/p cysto/stent    Vital Signs Last 24 Hrs  T(C): 37.1 (29 Jun 2023 21:17), Max: 37.1 (29 Jun 2023 21:17)  T(F): 98.8 (29 Jun 2023 21:17), Max: 98.8 (29 Jun 2023 21:17)  HR: 65 (29 Jun 2023 21:17) (63 - 81)  BP: 146/61 (29 Jun 2023 21:17) (137/60 - 219/77)  BP(mean): 95 (29 Jun 2023 20:30) (84 - 104)  RR: 18 (29 Jun 2023 21:17) (12 - 22)  SpO2: 96% (29 Jun 2023 21:17) (95% - 100%)    Parameters below as of 29 Jun 2023 21:17  Patient On (Oxygen Delivery Method): room air Pt POD 0 s/p cysto/stent  comfortable  no n/v  voided 250cc post-op    Vital Signs Last 24 Hrs  T(C): 37.1 (29 Jun 2023 21:17), Max: 37.1 (29 Jun 2023 21:17)  T(F): 98.8 (29 Jun 2023 21:17), Max: 98.8 (29 Jun 2023 21:17)  HR: 65 (29 Jun 2023 21:17) (63 - 81)  BP: 146/61 (29 Jun 2023 21:17) (137/60 - 219/77)  BP(mean): 95 (29 Jun 2023 20:30) (84 - 104)  RR: 18 (29 Jun 2023 21:17) (12 - 22)  SpO2: 96% (29 Jun 2023 21:17) (95% - 100%)    Parameters below as of 29 Jun 2023 21:17  Patient On (Oxygen Delivery Method): room air    stable post-op

## 2023-06-29 NOTE — H&P ADULT - NSHPREVIEWOFSYSTEMS_GEN_ALL_CORE
CONSTITUTIONAL: No weakness and fatigue; No fevers (+) chills  EYES/ENT: No visual changes;  No vertigo or throat pain   NECK: No pain or stiffness  RESPIRATORY: No cough, wheezing, hemoptysis; No shortness of breath  CARDIOVASCULAR: No chest pain or palpitations  GASTROINTESTINAL: No abdominal or epigastric pain. (+) [id number], vomiting. No hematemesis; No diarrhea or constipation. No melena or hematochezia.  GENITOURINARY: No dysuria, frequency or hematuria  NEUROLOGICAL: No numbness or weakness  SKIN: No itching, rashes CONSTITUTIONAL: No weakness and fatigue; No fevers (+) chills  EYES/ENT: No visual changes;  No vertigo or throat pain   NECK: No pain or stiffness  RESPIRATORY: No cough, wheezing, hemoptysis; No shortness of breath  CARDIOVASCULAR: No chest pain or palpitations  GASTROINTESTINAL: No abdominal or epigastric pain. (+) nausea, vomiting. No hematemesis; No diarrhea or constipation. No melena or hematochezia.  GENITOURINARY: No dysuria, frequency or hematuria  NEUROLOGICAL: No numbness or weakness  SKIN: No itching, rashes

## 2023-06-29 NOTE — H&P ADULT - NSICDXPASTMEDICALHX_GEN_ALL_CORE_FT
PAST MEDICAL HISTORY:  CKD (chronic kidney disease)     DM (diabetes mellitus)     HLD (hyperlipidemia)     HTN (hypertension)     IDDM (insulin dependent diabetes mellitus)

## 2023-06-29 NOTE — ED PROVIDER NOTE - OBJECTIVE STATEMENT
73-year-old female history of hypertension and diabetes presenting with left flank pain since last night.  Pain is constant with associated nausea.  Denies any fever, chills, dysuria or frequency.  Denies any history of abdominal surgeries.  Denies any cough, chest pain, shortness of breath

## 2023-06-29 NOTE — ED PROVIDER NOTE - WET READ LAUNCH FT
Pt states someone called her back and she has no further questions. I do not see any encounters re this, but no further action taken. Please close encounter. Thanks. There are no Wet Read(s) to document.

## 2023-06-29 NOTE — H&P ADULT - PROBLEM SELECTOR PLAN 1
WBC >13 + chills+ left flank pain x1d; denies urinary symptoms   No CVA tenderness  UA negative   CT confirming pyelonephritis w/ obstructing 5mm kidney stone w/ mild hydroureteronephrosis   Urology consulted - s/p LEFT stent  placement 6/29 WBC >13 + chills+ left flank pain x1d; denies urinary symptoms   No CVA tenderness  UA negative   CT confirming pyelonephritis w/ obstructing 5mm kidney stone w/ mild hydroureteronephrosis   Urology consulted - s/p LEFT stent  placement 6/29  c/w IVF NS   start meropenem 500mg q12h (renally dosed) empirically for bacterial seeding   f/u BCx and UCx, testing   ID consulted: Dr. Rodríguez  Nephrology consulted: Dr. Rowell

## 2023-06-29 NOTE — H&P ADULT - ASSESSMENT
[de-identified] : 73F from home, ambulates independently, PMHx HTN, HLD, IDDM, CKD stage 3b (follows nephrologist Dr. Gage Mayorga [phone] ), p/w left flank pain since last night. Aseptic appearing, VSS with mild leukocytosis. UA negative. Cultures sent. CT found to have 5mm obstructive stone with LEFT mild hydroureteronephrosis Hyperkalemic K 5.8 >normalized s/p IVF and hyperkalemia cocktail. EKG shows NSR. Urology consulted. Admitted for Pyelonephritis 2/2 obstructive kidney stone s/p stent placement 6/29       PRIMARY TEAM TO CONFIRM MED REC IN THE AM, PATIENT IS LIMITED HISTORIAN WITH NO LIST, MEDS RESUMED PER SURE SCRIPT. PATIENT DEFERRED MEDS TO PHARMACY AND NEPHROLOGIST.

## 2023-06-29 NOTE — CONSULT NOTE ADULT - SUBJECTIVE AND OBJECTIVE BOX
[facility] NEPHROLOGY- CONSULTATION NOTE    Patient is a 74yo Female with CKD, DM, HTN, HLD  presents with left flank pain x 2 days. Pt a/w ERIKA on CKD, Hyperkalemia and mild left hydro  2/2 obstructed stone. Nephrology consulted for Elevated serum creatinine.    Pt aware of CKD-3 and follows with Nephrologist Dr. Gage Mayorga in [facility]. Pt thinks her last SCr was 1.8 about 2 months ago. 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   Patient denies any NSAID use, recent CT with contrast, hepatitis or blood transfusions. Pt denies any dysuria or hematuria  Pt c/o dull left sided flank pain; constant for 2 days. Pt c/o nausea and forced herself to vomit once yesterday. Pt denies any fevers, chills, SOB, chest pain, diarrhea, abd pain or LE edema    PAST MEDICAL & SURGICAL HISTORY:  HTN (hypertension)      HLD (hyperlipidemia)      DM (diabetes mellitus)      IDDM (insulin dependent diabetes mellitus)      CKD (chronic kidney disease)      No significant past surgical history        Penicillin Testing Negative (Unknown)  penicillin (Rash)    Home Medications Reviewed  Hospital Medications:   MEDICATIONS  (STANDING):  lactated ringers. 1000 milliLiter(s) (100 mL/Hr) IV Continuous <Continuous>    SOCIAL HISTORY:  Denies ETOh, Smoking, or drug use  FAMILY HISTORY:      REVIEW OF SYSTEMS:  Gen: no changes in weight  HEENT: no rhinorrhea  Neck: no sore throat  Cards: no chest pain  Resp: no dyspnea  GI: +nausea with 1 episode of vomiting +diarrhea  : no dysuria or hematuria, +left flank pain  Vascular: no LE edema  Derm: no rashes  Neuro: no numbness/tingling  All other review of systems is negative unless indicated above.    VITALS:  T(F): 98.1 (06-29-23 @ 15:40), Max: 98.6 (06-29-23 @ 14:00)  HR: 81 (06-29-23 @ 15:40)  BP: 203/92 (06-29-23 @ 15:40)  RR: 18 (06-29-23 @ 15:40)  SpO2: 95% (06-29-23 @ 15:40)  Wt(kg): --    Height (cm): 154.9 (06-29 @ 10:11)  Weight (kg): 70.3 (06-29 @ 10:11)  BMI (kg/m2): 29.3 (06-29 @ 10:11)  BSA (m2): 1.69 (06-29 @ 10:11)    PHYSICAL EXAM:  Gen: NAD, calm  HEENT: MMM  Neck: no JVD  Cards: RRR, +S1/S2, no M/G/R  Resp: CTA B/L  GI: soft, NT/ND, NABS  : Mild Left CVA tenderness  No RT CVA tenderness  Extremities: no LE edema B/L  Derm: no rashes  Neuro: non-focal    LABS:  06-29    134<L>  |  105  |  54<H>  ----------------------------<  423<H>  4.8   |  21<L>  |  2.93<H>    Ca    9.8      29 Jun 2023 15:20    TPro  8.3  /  Alb  3.9  /  TBili  0.5  /  DBili      /  AST  30  /  ALT  45  /  AlkPhos  82  06-29    Creatinine Trend: 2.93 <--, 2.64 <--                        10.1   13.00 )-----------( 204      ( 29 Jun 2023 10:35 )             29.4     Urine Studies:  Urinalysis Basic - ( 29 Jun 2023 15:20 )    Color:  / Appearance:  / SG:  / pH:   Gluc: 423 mg/dL / Ketone:   / Bili:  / Urobili:    Blood:  / Protein:  / Nitrite:    Leuk Esterase:  / RBC:  / WBC    Sq Epi:  / Non Sq Epi:  / Bacteria:         RADIOLOGY & ADDITIONAL STUDIES:      < from: CT Abdomen and Pelvis No Cont (06.29.23 @ 10:48) >    ACC: [id number] EXAM:  CT ABDOMEN AND PELVIS   ORDERED BY: REGINA ARREDONDO     PROCEDURE DATE:  06/29/2023            < end of copied text >  < from: CT Abdomen and Pelvis No Cont (06.29.23 @ 10:48) >  KIDNEYS/URETERS: Mild left hydroureteronephrosis secondary to a 5 mm left   proximal to mid ureteral calculus. There are 2 nonobstructive calculi   left kidney each measuring 7 mm.. Bilateral perinephric stranding left   greater than right.    BLADDER: Within normal limits.    < end of copied text >  < from: CT Abdomen and Pelvis No Cont (06.29.23 @ 10:48) >    IMPRESSION:  Mild left hydroureteronephrosis secondary to a 5 mm proximal to mid left   ureteral calculus.  Additional nonobstructive left nephrolithiasis.  Colonic diverticulosis without diverticulitis      --- End of Report ---          < end of copied text >

## 2023-06-29 NOTE — H&P ADULT - PROBLEM SELECTOR PLAN 4
p/w K 5.8  EKG: NSR w/ LVH  s/p Hyperkalemia cocktail in the ED   Rpt K 4.8  will continue to monitor

## 2023-06-29 NOTE — CONSULT NOTE ADULT - ASSESSMENT
PIETRO JOSE is a 73y Female with symptomatic 5mm L ureteral stone  Likely ERIKA on CKD Cr 2.6, hyperK 5.8  mild left hydronephrosis     PLAN   - Added on for L stent placement with Dr. Harrison today 6/29   - Please document medical clearance/risk stratification   - HTN control   - HyperK; recommend cocktail, repeat BMP, EKG   - NPO  - IVF   - Recommend IV abx  - f/u UA, UCx  Very pleasant 73y Female with 5mm L ureteral stone, persistent flank pain, hypertension, hyperkalemia  Likely ERIKA on CKD Cr 2.6, hyperK 5.8  mild left hydronephrosis     PLAN   - Added on for L stent placement with Dr. Emanuel today 6/29   - HTN control   - HyperK; recommend cocktail, repeat BMP, EKG   - NPO  - IVF   - Recommend IV abx  - f/u UA, UCx   - CT images reviewed  - Labs reviewed  - Discussed with house staff  - Discussed R/B/A of a left ureteral stent, as well as need to definitively address kidney and ureteral stones in the future

## 2023-06-29 NOTE — H&P ADULT - HISTORY OF PRESENT ILLNESS
73F from home, ambulates independently , PMHx HTN, HLD, IDDM, CKD stage 3 (follows with nephrologist Dr. Gage Mayorga), p/w left flank pain since last night. Pain is constant with associated nausea a/w chills.  Denies fevers, chills, vomiting, dysuria, hematuria prior to yesterday.   Denies any history of abdominal surgeries. No reported h/o kidney stones or urinary retention, does not follow urologist.      73F from home, ambulates independently, PMHx HTN, HLD, IDDM, CKD stage 3b (follows nephrologist Dr. Gage Mayorga [phone] ), p/w left flank pain since last night. Describes pain as constant and dull associated with nausea a/w chills. Denies fevers, chills, vomiting, dysuria, hematuria. Denies urinary symptoms prior to yesterday. Denies any history of abdominal surgeries. No reported h/o kidney stones or urinary retention, does not follow urologist.

## 2023-06-29 NOTE — CONSULT NOTE ADULT - SUBJECTIVE AND OBJECTIVE BOX
HPI: 73F with PMH HTN, HLD, IDDM, CKD (follows with nephrologist Dr. Gage Mayorga), baseline Cr unknown, presenting with 2 days of constant, dull L flank pain associated with nausea. Denies fevers, chills, vomiting, dysuria, hematuria. Urology consulted for 5mm L ureteral stone with mild L hydronephrosis seen on CT. Also with two nonobstructing 7mm L renal stones.  In ED, hypertensive to 218/90, received losartan, afebrile, WBC 13. UA/Ucx pending.       PAST MEDICAL & SURGICAL HISTORY:  HTN (hypertension)      HLD (hyperlipidemia)      DM (diabetes mellitus)      IDDM (insulin dependent diabetes mellitus)      CKD (chronic kidney disease)      No significant past surgical history          MEDICATIONS  (STANDING):  calcium gluconate IVPB 1 Gram(s) IV Intermittent Once  dextrose 50% Injectable 50 milliLiter(s) IV Push Once  insulin regular  human recombinant 10 Unit(s) IV Push Once  sodium chloride 0.9% Bolus 1000 milliLiter(s) IV Bolus once  sodium polystyrene sulfonate Suspension 15 Gram(s) Oral Once    MEDICATIONS  (PRN):      Allergies    Penicillin Testing Negative (Unknown)    Intolerances        ROS: Negative except per HPI.     SOCIAL HISTORY:  Smoking history   ETOH history    OBJECTIVE:    Vital Signs Last 24 Hrs  T(C): 36.9 (29 Jun 2023 10:32), Max: 36.9 (29 Jun 2023 10:32)  T(F): 98.4 (29 Jun 2023 10:32), Max: 98.4 (29 Jun 2023 10:32)  HR: 64 (29 Jun 2023 10:32) (64 - 64)  BP: 218/90 (29 Jun 2023 10:32) (218/90 - 219/77)  BP(mean): --  RR: 18 (29 Jun 2023 10:32) (16 - 18)  SpO2: 96% (29 Jun 2023 10:32) (96% - 97%)    Parameters below as of 29 Jun 2023 10:32  Patient On (Oxygen Delivery Method): room air        I&O's Summary      LABS:                        10.1   13.00 )-----------( 204      ( 29 Jun 2023 10:35 )             29.4     06-29    138  |  107  |  50<H>  ----------------------------<  382<H>  5.8<H>   |  23  |  2.64<H>    Ca    9.5      29 Jun 2023 10:35    TPro  8.3  /  Alb  3.9  /  TBili  0.5  /  DBili  x   /  AST  30  /  ALT  45  /  AlkPhos  82  06-29      Urinalysis Basic - ( 29 Jun 2023 10:35 )    Color: x / Appearance: x / SG: x / pH: x  Gluc: 382 mg/dL / Ketone: x  / Bili: x / Urobili: x   Blood: x / Protein: x / Nitrite: x   Leuk Esterase: x / RBC: x / WBC x   Sq Epi: x / Non Sq Epi: x / Bacteria: x      CAPILLARY BLOOD GLUCOSE        LIVER FUNCTIONS - ( 29 Jun 2023 10:35 )  Alb: 3.9 g/dL / Pro: 8.3 g/dL / ALK PHOS: 82 U/L / ALT: 45 U/L DA / AST: 30 U/L / GGT: x             Cultures:      PHYSICAL EXAM:   General: AOx3, NAD.   Skin: Warm, dry. No jaundice.   HEENT: NC/AT. trachea midline. No scleral icterus.  Cardio: RR  Pulm: Normal chest rise and expansion. Non-labored breathing.  GI: abdomen soft, nontender, nondistended.  Back: No CVAT b/l  Psych: Normal mood and affect     RADIOLOGY & ADDITIONAL STUDIES:  < from: CT Abdomen and Pelvis No Cont (06.29.23 @ 10:48) >    ACC: [id number] EXAM:  CT ABDOMEN AND PELVIS   ORDERED BY: REGINA ARREDONDO     PROCEDURE DATE:  06/29/2023          INTERPRETATION:  CLINICAL INFORMATION: Left flank pain    COMPARISON: None.    CONTRAST/COMPLICATIONS:  IV Contrast: NONE  Oral Contrast: NONE  Complications: None reported at time of study completion    PROCEDURE:  CT of the Abdomen and Pelvis was performed.  Sagittal and coronal reformats were performed.    FINDINGS:  LOWER CHEST: Coronary artery calcification. Small hiatal hernia..    LIVER: Within normal limits.  BILE DUCTS: Normal caliber.  GALLBLADDER: Within normal limits.  SPLEEN: Within normal limits.  PANCREAS: Within normal limits.  ADRENALS: Within normal limits.  KIDNEYS/URETERS: Mild left hydroureteronephrosis secondary to a 5 mm left   proximal to mid ureteral calculus. There are 2 nonobstructive calculi   left kidney each measuring 7 mm.. Bilateral perinephric stranding left   greater than right.    BLADDER: Within normal limits.  REPRODUCTIVE ORGANS: Unremarkable    BOWEL: No bowel obstruction. Colonic diverticulosis. Appendix normal  PERITONEUM: No ascites.  VESSELS: Nonaneurysmal.  RETROPERITONEUM/LYMPH NODES: No lymphadenopathy.  ABDOMINAL WALL: Small fat-containing umbilical hernia.  BONES: Grade 1 anterolisthesis L4 on L5. Degenerative disc disease L5/S1.    IMPRESSION:  Mild left hydroureteronephrosis secondary to a 5 mm proximal to mid left   ureteral calculus.  Additional nonobstructive left nephrolithiasis.  Colonic diverticulosis without diverticulitis      --- End of Report ---    < end of copied text >       HPI: 73F with PMH HTN, HLD, IDDM, CKD (follows with nephrologist Dr. Gage Mayorga), baseline Cr unknown, presenting with 2 days of constant, dull L flank pain associated with nausea. Denies fevers, chills, vomiting, dysuria, hematuria. Urology consulted for 5mm L ureteral stone with mild L hydronephrosis seen on CT. Also with two nonobstructing 7mm L renal stones.  In ED, hypertensive to 218/90, received losartan, afebrile, WBC 13. UA/Ucx pending.       PAST MEDICAL & SURGICAL HISTORY:  HTN (hypertension)      HLD (hyperlipidemia)      DM (diabetes mellitus)      IDDM (insulin dependent diabetes mellitus)      CKD (chronic kidney disease)      No significant past surgical history    Family History: No family history of  malignancy  Social History: Does not smoke    ROS: All others negative except as noted above.        MEDICATIONS  (STANDING):  calcium gluconate IVPB 1 Gram(s) IV Intermittent Once  dextrose 50% Injectable 50 milliLiter(s) IV Push Once  insulin regular  human recombinant 10 Unit(s) IV Push Once  sodium chloride 0.9% Bolus 1000 milliLiter(s) IV Bolus once  sodium polystyrene sulfonate Suspension 15 Gram(s) Oral Once    Allergies    Penicillin Testing Negative (Unknown)      OBJECTIVE:    Vital Signs Last 24 Hrs  T(C): 36.9 (29 Jun 2023 10:32), Max: 36.9 (29 Jun 2023 10:32)  T(F): 98.4 (29 Jun 2023 10:32), Max: 98.4 (29 Jun 2023 10:32)  HR: 64 (29 Jun 2023 10:32) (64 - 64)  BP: 218/90 (29 Jun 2023 10:32) (218/90 - 219/77)  BP(mean): --  RR: 18 (29 Jun 2023 10:32) (16 - 18)  SpO2: 96% (29 Jun 2023 10:32) (96% - 97%)    Parameters below as of 29 Jun 2023 10:32  Patient On (Oxygen Delivery Method): room air        I&O's Summary      LABS:                        10.1   13.00 )-----------( 204      ( 29 Jun 2023 10:35 )             29.4     06-29    138  |  107  |  50<H>  ----------------------------<  382<H>  5.8<H>   |  23  |  2.64<H>    Ca    9.5      29 Jun 2023 10:35    TPro  8.3  /  Alb  3.9  /  TBili  0.5  /  DBili  x   /  AST  30  /  ALT  45  /  AlkPhos  82  06-29      Urinalysis Basic - ( 29 Jun 2023 10:35 )    Color: x / Appearance: x / SG: x / pH: x  Gluc: 382 mg/dL / Ketone: x  / Bili: x / Urobili: x   Blood: x / Protein: x / Nitrite: x   Leuk Esterase: x / RBC: x / WBC x   Sq Epi: x / Non Sq Epi: x / Bacteria: x      CAPILLARY BLOOD GLUCOSE        LIVER FUNCTIONS - ( 29 Jun 2023 10:35 )  Alb: 3.9 g/dL / Pro: 8.3 g/dL / ALK PHOS: 82 U/L / ALT: 45 U/L DA / AST: 30 U/L / GGT: x             Cultures:      RADIOLOGY & ADDITIONAL STUDIES:  < from: CT Abdomen and Pelvis No Cont (06.29.23 @ 10:48) >    ACC: [id number] EXAM:  CT ABDOMEN AND PELVIS   ORDERED BY: REGINA ARREDONDO     PROCEDURE DATE:  06/29/2023          INTERPRETATION:  CLINICAL INFORMATION: Left flank pain    COMPARISON: None.    CONTRAST/COMPLICATIONS:  IV Contrast: NONE  Oral Contrast: NONE  Complications: None reported at time of study completion    PROCEDURE:  CT of the Abdomen and Pelvis was performed.  Sagittal and coronal reformats were performed.    FINDINGS:  LOWER CHEST: Coronary artery calcification. Small hiatal hernia..    LIVER: Within normal limits.  BILE DUCTS: Normal caliber.  GALLBLADDER: Within normal limits.  SPLEEN: Within normal limits.  PANCREAS: Within normal limits.  ADRENALS: Within normal limits.  KIDNEYS/URETERS: Mild left hydroureteronephrosis secondary to a 5 mm left   proximal to mid ureteral calculus. There are 2 nonobstructive calculi   left kidney each measuring 7 mm.. Bilateral perinephric stranding left   greater than right.    BLADDER: Within normal limits.  REPRODUCTIVE ORGANS: Unremarkable    BOWEL: No bowel obstruction. Colonic diverticulosis. Appendix normal  PERITONEUM: No ascites.  VESSELS: Nonaneurysmal.  RETROPERITONEUM/LYMPH NODES: No lymphadenopathy.  ABDOMINAL WALL: Small fat-containing umbilical hernia.  BONES: Grade 1 anterolisthesis L4 on L5. Degenerative disc disease L5/S1.    IMPRESSION:  Mild left hydroureteronephrosis secondary to a 5 mm proximal to mid left   ureteral calculus.  Additional nonobstructive left nephrolithiasis.  Colonic diverticulosis without diverticulitis      --- End of Report ---    < end of copied text >

## 2023-06-29 NOTE — H&P ADULT - ATTENDING COMMENTS
73F from home, ambulates independently , PMHx HTN, HLD, IDDM, CKD stage 3 (follows with nephrologist Dr. Gage Mayorga), p/w left flank pain since last night. Pain is constant with associated nausea a/w chills.  Denies fevers, chills, vomiting, dysuria, hematuria prior to yesterday.   Denies any history of abdominal surgeries. No reported h/o kidney stones or urinary retention, does not follow urologist.     # SUSPECT PYELONEPHRITIS  # MILD LEFT HYDROURETERONEPHROSIS, OBSTRUCTING LEFT NEPHROLITHIASIS S/P LEFT STENT PLACEMENT 6/29    - PLACE ON ROCEPHIN, F/U BCX AND UCX  - NOTED CT A/P  - S/P LEFT STENT PLACEMENT 6/29   - UROLOGY CONSULT  - ID CONSULT    # HYPERKALEMIA - IMPROVED  - S/P LOKELMA, S/P KAYEXYLATE  - NEPHROLOGY CONSULT    # MEDICAL CLARANCE FOR SURGERY  - RCRI - 2 - 10% 30 DAY RISK OF DEATH, MI OR CARDIAC ARREST    # ERIKA ON CKD3  - NOTED UA  - PLACE ON IVF  - MONITOR CR  - AVOID NEPHROTOXIC    # NORMOCYTIC ANEMIA  - TREND HGB    # HTN  # HLD  # IDDM  # GI AND DVT PPX 73F from home, ambulates independently , PMHx HTN, HLD, IDDM, CKD stage 3 (follows with nephrologist Dr. Gage Mayorga), p/w left flank pain since last night. Pain is constant with associated nausea a/w chills.  Denies fevers, chills, vomiting, dysuria, hematuria prior to yesterday.   Denies any history of abdominal surgeries. No reported h/o kidney stones or urinary retention, does not follow urologist.     # SUSPECT PYELONEPHRITIS  # MILD LEFT HYDROURETERONEPHROSIS, OBSTRUCTING LEFT NEPHROLITHIASIS S/P LEFT STENT PLACEMENT 6/29    - PLACE ON MEROPENEM, F/U BCX AND UCX  - NOTED CT A/P  - S/P LEFT STENT PLACEMENT 6/29   - UROLOGY CONSULT  - ID CONSULT    # HYPERKALEMIA - IMPROVED  - S/P LOKELMA, S/P KAYEXYLATE  - NEPHROLOGY CONSULT    # MEDICAL CLARANCE FOR SURGERY  - RCRI - 2 - 10% 30 DAY RISK OF DEATH, MI OR CARDIAC ARREST    # ERIKA ON CKD3  - NOTED UA  - PLACE ON IVF  - MONITOR CR  - AVOID NEPHROTOXIC    # NORMOCYTIC ANEMIA  - TREND HGB    # HTN  # HLD  # IDDM  # GI AND DVT PPX

## 2023-06-29 NOTE — ED PROVIDER NOTE - CLINICAL SUMMARY MEDICAL DECISION MAKING FREE TEXT BOX
73-year-old female with left flank pain.  Symptoms may suggest UTI, kidney stones, muscle strain less likely pneumonia or ACS.  Plan to perform labs, urinalysis, CT abdomen pelvis and reassess.

## 2023-06-29 NOTE — H&P ADULT - PROBLEM SELECTOR PLAN 5
p/w SCr 2.6 on admission  Baseline SCr - 1.48  likely in the setting of obstructing kidney stone c/b resistant HTN   s/p 1L NS x1 in the ED   avoid NSAIDs and nephrotoxic agents   c/w IVF x  24hr  monitor CMP daily  Nephrology consulted: Dr. Rowell p/w SCr 2.6 on admission,   Follows Nephrologist, Dr. Gage Mayorga [phone]   Baseline SCr - 1.8, needs verification   likely in the setting of obstructing kidney stone c/b resistant HTN   s/p 1L NS x1 in the ED   avoid NSAIDs and nephrotoxic agents   c/w IVF x  24hr  monitor CMP daily  Nephrology consulted: Dr. Rowell

## 2023-06-29 NOTE — H&P ADULT - PROBLEM SELECTOR PLAN 7
h/o DM on long-acting 50 qhs    f/u A1c  c/w lantus 30 + sliding scale  Adjust insulin as indicated  FS ACHS

## 2023-06-29 NOTE — ED PROVIDER NOTE - CARE PLAN
Principal Discharge DX:	Acute hyperkalemia  Secondary Diagnosis:	Acute kidney injury superimposed on chronic kidney disease  Secondary Diagnosis:	Renal colic   1

## 2023-06-29 NOTE — ED ADULT NURSE NOTE - DRUG PRE-SCREENING (DAST -1)
Detail Level: Zone Topical Retinoid counseling:  Patient advised to apply a pea-sized amount only at bedtime and wait 30 minutes after washing their face before applying.  If too drying, patient may add a non-comedogenic moisturizer. The patient verbalized understanding of the proper use and possible adverse effects of retinoids.  All of the patient's questions and concerns were addressed. Doxycycline Counseling:  Patient counseled regarding possible photosensitivity and increased risk for sunburn.  Patient instructed to avoid sunlight, if possible.  When exposed to sunlight, patients should wear protective clothing, sunglasses, and sunscreen.  The patient was instructed to call the office immediately if the following severe adverse effects occur:  hearing changes, easy bruising/bleeding, severe headache, or vision changes.  The patient verbalized understanding of the proper use and possible adverse effects of doxycycline.  All of the patient's questions and concerns were addressed. Use Enhanced Medication Counseling?: No Tetracycline Counseling: Patient counseled regarding possible photosensitivity and increased risk for sunburn.  Patient instructed to avoid sunlight, if possible.  When exposed to sunlight, patients should wear protective clothing, sunglasses, and sunscreen.  The patient was instructed to call the office immediately if the following severe adverse effects occur:  hearing changes, easy bruising/bleeding, severe headache, or vision changes.  The patient verbalized understanding of the proper use and possible adverse effects of tetracycline.  All of the patient's questions and concerns were addressed. Patient understands to avoid pregnancy while on therapy due to potential birth defects. Minocycline Pregnancy And Lactation Text: This medication is Pregnancy Category D and not consider safe during pregnancy. It is also excreted in breast milk. Spironolactone Pregnancy And Lactation Text: This medication can cause feminization of the male fetus and should be avoided during pregnancy. The active metabolite is also found in breast milk. Minocycline Counseling: Patient advised regarding possible photosensitivity and discoloration of the teeth, skin, lips, tongue and gums.  Patient instructed to avoid sunlight, if possible.  When exposed to sunlight, patients should wear protective clothing, sunglasses, and sunscreen.  The patient was instructed to call the office immediately if the following severe adverse effects occur:  hearing changes, easy bruising/bleeding, severe headache, or vision changes.  The patient verbalized understanding of the proper use and possible adverse effects of minocycline.  All of the patient's questions and concerns were addressed. Bactrim Pregnancy And Lactation Text: This medication is Pregnancy Category D and is known to cause fetal risk.  It is also excreted in breast milk. Topical Clindamycin Counseling: Patient counseled that this medication may cause skin irritation or allergic reactions.  In the event of skin irritation, the patient was advised to reduce the amount of the drug applied or use it less frequently.   The patient verbalized understanding of the proper use and possible adverse effects of clindamycin.  All of the patient's questions and concerns were addressed. Isotretinoin Counseling: Patient should get monthly blood tests, not donate blood, not drive at night if vision affected, not share medication, and not undergo elective surgery for 6 months after tx completed. Side effects reviewed, pt to contact office should one occur. Sarecycline Counseling: Patient advised regarding possible photosensitivity and discoloration of the teeth, skin, lips, tongue and gums.  Patient instructed to avoid sunlight, if possible.  When exposed to sunlight, patients should wear protective clothing, sunglasses, and sunscreen.  The patient was instructed to call the office immediately if the following severe adverse effects occur:  hearing changes, easy bruising/bleeding, severe headache, or vision changes.  The patient verbalized understanding of the proper use and possible adverse effects of sarecycline.  All of the patient's questions and concerns were addressed. Statement Selected Azithromycin Counseling:  I discussed with the patient the risks of azithromycin including but not limited to GI upset, allergic reaction, drug rash, diarrhea, and yeast infections. Topical Retinoid Pregnancy And Lactation Text: This medication is Pregnancy Category C. It is unknown if this medication is excreted in breast milk. High Dose Vitamin A Counseling: Side effects reviewed, pt to contact office should one occur. Doxycycline Pregnancy And Lactation Text: This medication is Pregnancy Category D and not consider safe during pregnancy. It is also excreted in breast milk but is considered safe for shorter treatment courses. Topical Clindamycin Pregnancy And Lactation Text: This medication is Pregnancy Category B and is considered safe during pregnancy. It is unknown if it is excreted in breast milk. Isotretinoin Pregnancy And Lactation Text: This medication is Pregnancy Category X and is considered extremely dangerous during pregnancy. It is unknown if it is excreted in breast milk. Birth Control Pills Counseling: Birth Control Pill Counseling: I discussed with the patient the potential side effects of OCPs including but not limited to increased risk of stroke, heart attack, thrombophlebitis, deep venous thrombosis, hepatic adenomas, breast changes, GI upset, headaches, and depression.  The patient verbalized understanding of the proper use and possible adverse effects of OCPs. All of the patient's questions and concerns were addressed. Erythromycin Counseling:  I discussed with the patient the risks of erythromycin including but not limited to GI upset, allergic reaction, drug rash, diarrhea, increase in liver enzymes, and yeast infections. Dapsone Counseling: I discussed with the patient the risks of dapsone including but not limited to hemolytic anemia, agranulocytosis, rashes, methemoglobinemia, kidney failure, peripheral neuropathy, headaches, GI upset, and liver toxicity.  Patients who start dapsone require monitoring including baseline LFTs and weekly CBCs for the first month, then every month thereafter.  The patient verbalized understanding of the proper use and possible adverse effects of dapsone.  All of the patient's questions and concerns were addressed. Topical Sulfur Applications Pregnancy And Lactation Text: This medication is Pregnancy Category C and has an unknown safety profile during pregnancy. It is unknown if this topical medication is excreted in breast milk. High Dose Vitamin A Pregnancy And Lactation Text: High dose vitamin A therapy is contraindicated during pregnancy and breast feeding. Birth Control Pills Pregnancy And Lactation Text: This medication should be avoided if pregnant and for the first 30 days post-partum. Topical Sulfur Applications Counseling: Topical Sulfur Counseling: Patient counseled that this medication may cause skin irritation or allergic reactions.  In the event of skin irritation, the patient was advised to reduce the amount of the drug applied or use it less frequently.   The patient verbalized understanding of the proper use and possible adverse effects of topical sulfur application.  All of the patient's questions and concerns were addressed. Azithromycin Pregnancy And Lactation Text: This medication is considered safe during pregnancy and is also secreted in breast milk. Tazorac Counseling:  Patient advised that medication is irritating and drying.  Patient may need to apply sparingly and wash off after an hour before eventually leaving it on overnight.  The patient verbalized understanding of the proper use and possible adverse effects of tazorac.  All of the patient's questions and concerns were addressed. Bactrim Counseling:  I discussed with the patient the risks of sulfa antibiotics including but not limited to GI upset, allergic reaction, drug rash, diarrhea, dizziness, photosensitivity, and yeast infections.  Rarely, more serious reactions can occur including but not limited to aplastic anemia, agranulocytosis, methemoglobinemia, blood dyscrasias, liver or kidney failure, lung infiltrates or desquamative/blistering drug rashes. Benzoyl Peroxide Pregnancy And Lactation Text: This medication is Pregnancy Category C. It is unknown if benzoyl peroxide is excreted in breast milk. Dapsone Pregnancy And Lactation Text: This medication is Pregnancy Category C and is not considered safe during pregnancy or breast feeding. Tazorac Pregnancy And Lactation Text: This medication is not safe during pregnancy. It is unknown if this medication is excreted in breast milk. Benzoyl Peroxide Counseling: Patient counseled that medicine may cause skin irritation and bleach clothing.  In the event of skin irritation, the patient was advised to reduce the amount of the drug applied or use it less frequently.   The patient verbalized understanding of the proper use and possible adverse effects of benzoyl peroxide.  All of the patient's questions and concerns were addressed. Erythromycin Pregnancy And Lactation Text: This medication is Pregnancy Category B and is considered safe during pregnancy. It is also excreted in breast milk. Spironolactone Counseling: Patient advised regarding risks of diarrhea, abdominal pain, hyperkalemia, birth defects (for female patients), liver toxicity and renal toxicity. The patient may need blood work to monitor liver and kidney function and potassium levels while on therapy. The patient verbalized understanding of the proper use and possible adverse effects of spironolactone.  All of the patient's questions and concerns were addressed.

## 2023-06-30 DIAGNOSIS — Z02.9 ENCOUNTER FOR ADMINISTRATIVE EXAMINATIONS, UNSPECIFIED: ICD-10-CM

## 2023-06-30 LAB
A1C WITH ESTIMATED AVERAGE GLUCOSE RESULT: 7.8 % — HIGH (ref 4–5.6)
ALBUMIN SERPL ELPH-MCNC: 3.6 G/DL — SIGNIFICANT CHANGE UP (ref 3.5–5)
ALP SERPL-CCNC: 74 U/L — SIGNIFICANT CHANGE UP (ref 40–120)
ALT FLD-CCNC: 35 U/L DA — SIGNIFICANT CHANGE UP (ref 10–60)
ANION GAP SERPL CALC-SCNC: 10 MMOL/L — SIGNIFICANT CHANGE UP (ref 5–17)
AST SERPL-CCNC: 22 U/L — SIGNIFICANT CHANGE UP (ref 10–40)
BASOPHILS # BLD AUTO: 0.04 K/UL — SIGNIFICANT CHANGE UP (ref 0–0.2)
BASOPHILS NFR BLD AUTO: 0.3 % — SIGNIFICANT CHANGE UP (ref 0–2)
BILIRUB SERPL-MCNC: 0.4 MG/DL — SIGNIFICANT CHANGE UP (ref 0.2–1.2)
BUN SERPL-MCNC: 49 MG/DL — HIGH (ref 7–18)
CALCIUM SERPL-MCNC: 9 MG/DL — SIGNIFICANT CHANGE UP (ref 8.4–10.5)
CHLORIDE SERPL-SCNC: 110 MMOL/L — HIGH (ref 96–108)
CHLORIDE UR-SCNC: 24 MMOL/L — SIGNIFICANT CHANGE UP
CO2 SERPL-SCNC: 20 MMOL/L — LOW (ref 22–31)
CREAT ?TM UR-MCNC: 87 MG/DL — SIGNIFICANT CHANGE UP
CREAT SERPL-MCNC: 2.55 MG/DL — HIGH (ref 0.5–1.3)
EGFR: 19 ML/MIN/1.73M2 — LOW
EOSINOPHIL # BLD AUTO: 0.16 K/UL — SIGNIFICANT CHANGE UP (ref 0–0.5)
EOSINOPHIL NFR BLD AUTO: 1.2 % — SIGNIFICANT CHANGE UP (ref 0–6)
ESTIMATED AVERAGE GLUCOSE: 177 MG/DL — HIGH (ref 68–114)
GLUCOSE BLDC GLUCOMTR-MCNC: 124 MG/DL — HIGH (ref 70–99)
GLUCOSE BLDC GLUCOMTR-MCNC: 200 MG/DL — HIGH (ref 70–99)
GLUCOSE BLDC GLUCOMTR-MCNC: 200 MG/DL — HIGH (ref 70–99)
GLUCOSE BLDC GLUCOMTR-MCNC: 208 MG/DL — HIGH (ref 70–99)
GLUCOSE SERPL-MCNC: 216 MG/DL — HIGH (ref 70–99)
HCT VFR BLD CALC: 27.5 % — LOW (ref 34.5–45)
HCV AB S/CO SERPL IA: 0.09 S/CO — SIGNIFICANT CHANGE UP (ref 0–0.99)
HCV AB SERPL-IMP: SIGNIFICANT CHANGE UP
HGB BLD-MCNC: 9.3 G/DL — LOW (ref 11.5–15.5)
IMM GRANULOCYTES NFR BLD AUTO: 0.5 % — SIGNIFICANT CHANGE UP (ref 0–0.9)
LYMPHOCYTES # BLD AUTO: 16.4 % — SIGNIFICANT CHANGE UP (ref 13–44)
LYMPHOCYTES # BLD AUTO: 2.11 K/UL — SIGNIFICANT CHANGE UP (ref 1–3.3)
MAGNESIUM SERPL-MCNC: 2.1 MG/DL — SIGNIFICANT CHANGE UP (ref 1.6–2.6)
MCHC RBC-ENTMCNC: 33.1 PG — SIGNIFICANT CHANGE UP (ref 27–34)
MCHC RBC-ENTMCNC: 33.8 GM/DL — SIGNIFICANT CHANGE UP (ref 32–36)
MCV RBC AUTO: 97.9 FL — SIGNIFICANT CHANGE UP (ref 80–100)
MONOCYTES # BLD AUTO: 1.08 K/UL — HIGH (ref 0–0.9)
MONOCYTES NFR BLD AUTO: 8.4 % — SIGNIFICANT CHANGE UP (ref 2–14)
NEUTROPHILS # BLD AUTO: 9.44 K/UL — HIGH (ref 1.8–7.4)
NEUTROPHILS NFR BLD AUTO: 73.2 % — SIGNIFICANT CHANGE UP (ref 43–77)
NRBC # BLD: 0 /100 WBCS — SIGNIFICANT CHANGE UP (ref 0–0)
OSMOLALITY UR: 359 MOS/KG — SIGNIFICANT CHANGE UP (ref 50–1200)
PHOSPHATE SERPL-MCNC: 3.1 MG/DL — SIGNIFICANT CHANGE UP (ref 2.5–4.5)
PLATELET # BLD AUTO: 202 K/UL — SIGNIFICANT CHANGE UP (ref 150–400)
POTASSIUM SERPL-MCNC: 4.9 MMOL/L — SIGNIFICANT CHANGE UP (ref 3.5–5.3)
POTASSIUM SERPL-SCNC: 4.9 MMOL/L — SIGNIFICANT CHANGE UP (ref 3.5–5.3)
PROT ?TM UR-MCNC: 286 MG/DL — HIGH (ref 0–12)
PROT SERPL-MCNC: 7.6 G/DL — SIGNIFICANT CHANGE UP (ref 6–8.3)
RBC # BLD: 2.81 M/UL — LOW (ref 3.8–5.2)
RBC # FLD: 13 % — SIGNIFICANT CHANGE UP (ref 10.3–14.5)
SODIUM SERPL-SCNC: 140 MMOL/L — SIGNIFICANT CHANGE UP (ref 135–145)
SODIUM UR-SCNC: 35 MMOL/L — SIGNIFICANT CHANGE UP
UUN UR-MCNC: 457 MG/DL — SIGNIFICANT CHANGE UP
WBC # BLD: 12.9 K/UL — HIGH (ref 3.8–10.5)
WBC # FLD AUTO: 12.9 K/UL — HIGH (ref 3.8–10.5)

## 2023-06-30 PROCEDURE — 99232 SBSQ HOSP IP/OBS MODERATE 35: CPT

## 2023-06-30 RX ORDER — HYDRALAZINE HCL 50 MG
10 TABLET ORAL THREE TIMES A DAY
Refills: 0 | Status: DISCONTINUED | OUTPATIENT
Start: 2023-06-30 | End: 2023-07-01

## 2023-06-30 RX ORDER — TAMSULOSIN HYDROCHLORIDE 0.4 MG/1
0.4 CAPSULE ORAL AT BEDTIME
Refills: 0 | Status: DISCONTINUED | OUTPATIENT
Start: 2023-06-30 | End: 2023-07-02

## 2023-06-30 RX ORDER — HYDRALAZINE HCL 50 MG
10 TABLET ORAL DAILY
Refills: 0 | Status: DISCONTINUED | OUTPATIENT
Start: 2023-06-30 | End: 2023-06-30

## 2023-06-30 RX ADMIN — Medication 1: at 11:39

## 2023-06-30 RX ADMIN — SODIUM CHLORIDE 100 MILLILITER(S): 9 INJECTION INTRAMUSCULAR; INTRAVENOUS; SUBCUTANEOUS at 08:08

## 2023-06-30 RX ADMIN — MEROPENEM 100 MILLIGRAM(S): 1 INJECTION INTRAVENOUS at 22:19

## 2023-06-30 RX ADMIN — Medication 1: at 08:08

## 2023-06-30 RX ADMIN — TAMSULOSIN HYDROCHLORIDE 0.4 MILLIGRAM(S): 0.4 CAPSULE ORAL at 21:50

## 2023-06-30 RX ADMIN — SODIUM CHLORIDE 100 MILLILITER(S): 9 INJECTION INTRAMUSCULAR; INTRAVENOUS; SUBCUTANEOUS at 21:50

## 2023-06-30 RX ADMIN — MEROPENEM 100 MILLIGRAM(S): 1 INJECTION INTRAVENOUS at 11:31

## 2023-06-30 RX ADMIN — Medication 2: at 17:05

## 2023-06-30 NOTE — PROGRESS NOTE ADULT - SUBJECTIVE AND OBJECTIVE BOX
Patient is a 73y old  Female who presents with a chief complaint of FLANK PAIN.  PATIENT IS SEEN AND EXAMINED IN MEDICAL FLOOR.    ALLERGIES:  Penicillin Testing Negative (Unknown)  penicillin (Rash)      Daily     Daily Weight in k.9 (2023 04:06)    VITALS:    Vital Signs Last 24 Hrs  T(C): 37.2 (2023 07:25), Max: 37.3 (2023 04:06)  T(F): 99 (2023 07:25), Max: 99.1 (2023 04:06)  HR: 61 (2023 07:25) (61 - 81)  BP: 161/66 (2023 07:25) (134/57 - 218/90)  BP(mean): 95 (2023 20:30) (84 - 104)  RR: 17 (2023 07:25) (12 - 22)  SpO2: 94% (2023 07:25) (94% - 100%)    Parameters below as of 2023 07:25  Patient On (Oxygen Delivery Method): room air        LABS:    CBC Full  -  ( 2023 05:50 )  WBC Count : 12.90 K/uL  RBC Count : 2.81 M/uL  Hemoglobin : 9.3 g/dL  Hematocrit : 27.5 %  Platelet Count - Automated : 202 K/uL  Mean Cell Volume : 97.9 fl  Mean Cell Hemoglobin : 33.1 pg  Mean Cell Hemoglobin Concentration : 33.8 gm/dL  Auto Neutrophil # : 9.44 K/uL  Auto Lymphocyte # : 2.11 K/uL  Auto Monocyte # : 1.08 K/uL  Auto Eosinophil # : 0.16 K/uL  Auto Basophil # : 0.04 K/uL  Auto Neutrophil % : 73.2 %  Auto Lymphocyte % : 16.4 %  Auto Monocyte % : 8.4 %  Auto Eosinophil % : 1.2 %  Auto Basophil % : 0.3 %    PT/INR - ( 2023 15:20 )   PT: 13.2 sec;   INR: 1.11 ratio         PTT - ( 2023 15:20 )  PTT:25.1 sec  06-30    140  |  110<H>  |  49<H>  ----------------------------<  216<H>  4.9   |  20<L>  |  2.55<H>    Ca    9.0      2023 05:50  Phos  3.1     -  Mg     2.1     -30    TPro  7.6  /  Alb  3.6  /  TBili  0.4  /  DBili  x   /  AST  22  /  ALT  35  /  AlkPhos  74  -    CAPILLARY BLOOD GLUCOSE      POCT Blood Glucose.: 200 mg/dL (2023 07:46)  POCT Blood Glucose.: 240 mg/dL (2023 22:42)  POCT Blood Glucose.: 289 mg/dL (2023 19:40)  POCT Blood Glucose.: 336 mg/dL (2023 16:59)        LIVER FUNCTIONS - ( 2023 05:50 )  Alb: 3.6 g/dL / Pro: 7.6 g/dL / ALK PHOS: 74 U/L / ALT: 35 U/L DA / AST: 22 U/L / GGT: x           Creatinine Trend: 2.55<--, 2.93<--, 2.64<--  I&O's Summary    2023 07:01  -  2023 07:00  --------------------------------------------------------  IN: 1000 mL / OUT: 501 mL / NET: 499 mL                MEDICATIONS:    MEDICATIONS  (STANDING):  insulin lispro (ADMELOG) corrective regimen sliding scale   SubCutaneous three times a day before meals  meropenem  IVPB 500 milliGRAM(s) IV Intermittent every 12 hours  sodium chloride 0.9%. 1000 milliLiter(s) (100 mL/Hr) IV Continuous <Continuous>  tamsulosin 0.4 milliGRAM(s) Oral at bedtime      MEDICATIONS  (PRN):  ondansetron Injectable 4 milliGRAM(s) IV Push every 8 hours PRN Nausea and/or Vomiting      REVIEW OF SYSTEMS:                           ALL ROS DONE [ X   ]    CONSTITUTIONAL:  LETHARGIC [   ], FEVER [   ], UNRESPONSIVE [   ]  CVS:  CP  [   ], SOB, [   ], PALPITATIONS [   ], DIZZYNESS [   ]  RS: COUGH [   ], SPUTUM [   ]  GI: ABDOMINAL PAIN [   ], NAUSEA [   ], VOMITINGS [   ], DIARRHEA [   ], CONSTIPATION [   ]  :  DYSURIA [   ], NOCTURIA [   ], INCREASED FREQUENCY [   ], DRIBLING [   ],  SKELETAL: PAINFUL JOINTS [   ], SWOLLEN JOINTS [   ], NECK ACHE [   ], LOW BACK ACHE [   ],  SKIN : ULCERS [   ], RASH [   ], ITCHING [   ]  CNS: HEAD ACHE [   ], DOUBLE VISION [   ], BLURRED VISION [   ], AMS / CONFUSION [   ], SEIZURES [   ], WEAKNESS [   ],TINGLING / NUMBNESS [   ]    PHYSICAL EXAMINATION:  GENERAL APPEARANCE: NO DISTRESS  HEENT:  NO PALLOR, NO  JVD,  NO   NODES, NECK SUPPLE  CVS: S1 +, S2 +,   RS: AEEB,  OCCASIONAL  RALES +,   NO RONCHI  ABD: SOFT, NT, NO, BS +  EXT: NO PE  SKIN: WARM,   SKELETAL:  ROM ACCEPTABLE  CNS:  AAO X 3    RADIOLOGY :    RADIOLOGY AND READINGS REVIEWED    ASSESSMENT :     Hyperkalemia    HTN (hypertension)    HLD (hyperlipidemia)    DM (diabetes mellitus)    IDDM (insulin dependent diabetes mellitus)    CKD (chronic kidney disease)    No significant past surgical history        PLAN:  HPI:  73F from home, ambulates independently, PMHx HTN, HLD, IDDM, CKD stage 3b (follows nephrologist Dr. Gage Mayorga [phone] ), p/w left flank pain since last night. Describes pain as constant and dull associated with nausea a/w chills. Denies fevers, chills, vomiting, dysuria, hematuria. Denies urinary symptoms prior to yesterday. Denies any history of abdominal surgeries. No reported h/o kidney stones or urinary retention, does not follow urologist.          (2023 14:48)    # SUSPECT PYELONEPHRITIS  # MILD LEFT HYDROURETERONEPHROSIS, OBSTRUCTING LEFT NEPHROLITHIASIS S/P LEFT STENT PLACEMENT     - PLACE ON MEROPENEM, F/U BCX AND UCX  - NOTED CT A/P  - S/P LEFT STENT PLACEMENT    - FLOMAX  - UROLOGY CONSULT  - ID CONSULT    # HYPERKALEMIA - IMPROVED  - S/P LOKELMA, S/P KAYEXYLATE  - S/P TELEMETRY  - NEPHROLOGY CONSULT    # MEDICAL CLARANCE FOR SURGERY  - RCRI - 2 - 10% 30 DAY RISK OF DEATH, MI OR CARDIAC ARREST    # ERIKA ON CKD3  - NOTED UA  - PLACE ON IVF  - MONITOR CR  - AVOID NEPHROTOXIC    # NORMOCYTIC ANEMIA  - TREND HGB    # HTN  # HLD  # IDDM  # GI AND DVT PPX   Patient is a 73y old  Female who presents with a chief complaint of FLANK PAIN.  PATIENT IS SEEN AND EXAMINED IN MEDICAL FLOOR.    ALLERGIES:  Penicillin Testing Negative (Unknown)  penicillin (Rash)      Daily     Daily Weight in k.9 (2023 04:06)    VITALS:    Vital Signs Last 24 Hrs  T(C): 37.2 (2023 07:25), Max: 37.3 (2023 04:06)  T(F): 99 (2023 07:25), Max: 99.1 (2023 04:06)  HR: 61 (2023 07:25) (61 - 81)  BP: 161/66 (2023 07:25) (134/57 - 218/90)  BP(mean): 95 (2023 20:30) (84 - 104)  RR: 17 (2023 07:25) (12 - 22)  SpO2: 94% (2023 07:25) (94% - 100%)    Parameters below as of 2023 07:25  Patient On (Oxygen Delivery Method): room air        LABS:    CBC Full  -  ( 2023 05:50 )  WBC Count : 12.90 K/uL  RBC Count : 2.81 M/uL  Hemoglobin : 9.3 g/dL  Hematocrit : 27.5 %  Platelet Count - Automated : 202 K/uL  Mean Cell Volume : 97.9 fl  Mean Cell Hemoglobin : 33.1 pg  Mean Cell Hemoglobin Concentration : 33.8 gm/dL  Auto Neutrophil # : 9.44 K/uL  Auto Lymphocyte # : 2.11 K/uL  Auto Monocyte # : 1.08 K/uL  Auto Eosinophil # : 0.16 K/uL  Auto Basophil # : 0.04 K/uL  Auto Neutrophil % : 73.2 %  Auto Lymphocyte % : 16.4 %  Auto Monocyte % : 8.4 %  Auto Eosinophil % : 1.2 %  Auto Basophil % : 0.3 %    PT/INR - ( 2023 15:20 )   PT: 13.2 sec;   INR: 1.11 ratio         PTT - ( 2023 15:20 )  PTT:25.1 sec  06-30    140  |  110<H>  |  49<H>  ----------------------------<  216<H>  4.9   |  20<L>  |  2.55<H>    Ca    9.0      2023 05:50  Phos  3.1     -  Mg     2.1     -30    TPro  7.6  /  Alb  3.6  /  TBili  0.4  /  DBili  x   /  AST  22  /  ALT  35  /  AlkPhos  74  -    CAPILLARY BLOOD GLUCOSE      POCT Blood Glucose.: 200 mg/dL (2023 07:46)  POCT Blood Glucose.: 240 mg/dL (2023 22:42)  POCT Blood Glucose.: 289 mg/dL (2023 19:40)  POCT Blood Glucose.: 336 mg/dL (2023 16:59)        LIVER FUNCTIONS - ( 2023 05:50 )  Alb: 3.6 g/dL / Pro: 7.6 g/dL / ALK PHOS: 74 U/L / ALT: 35 U/L DA / AST: 22 U/L / GGT: x           Creatinine Trend: 2.55<--, 2.93<--, 2.64<--  I&O's Summary    2023 07:01  -  2023 07:00  --------------------------------------------------------  IN: 1000 mL / OUT: 501 mL / NET: 499 mL    MEDICATIONS:    MEDICATIONS  (STANDING):  insulin lispro (ADMELOG) corrective regimen sliding scale   SubCutaneous three times a day before meals  meropenem  IVPB 500 milliGRAM(s) IV Intermittent every 12 hours  sodium chloride 0.9%. 1000 milliLiter(s) (100 mL/Hr) IV Continuous <Continuous>  tamsulosin 0.4 milliGRAM(s) Oral at bedtime      MEDICATIONS  (PRN):  ondansetron Injectable 4 milliGRAM(s) IV Push every 8 hours PRN Nausea and/or Vomiting      REVIEW OF SYSTEMS:                           ALL ROS DONE [ X   ]    CONSTITUTIONAL:  LETHARGIC [   ], FEVER [   ], UNRESPONSIVE [   ]  CVS:  CP  [   ], SOB, [   ], PALPITATIONS [   ], DIZZYNESS [   ]  RS: COUGH [   ], SPUTUM [   ]  GI: ABDOMINAL PAIN [   ], NAUSEA [   ], VOMITINGS [   ], DIARRHEA [   ], CONSTIPATION [   ]  :  DYSURIA [   ], NOCTURIA [   ], INCREASED FREQUENCY [   ], DRIBLING [   ],  SKELETAL: PAINFUL JOINTS [   ], SWOLLEN JOINTS [   ], NECK ACHE [   ], LOW BACK ACHE [   ],  SKIN : ULCERS [   ], RASH [   ], ITCHING [   ]  CNS: HEAD ACHE [   ], DOUBLE VISION [   ], BLURRED VISION [   ], AMS / CONFUSION [   ], SEIZURES [   ], WEAKNESS [   ],TINGLING / NUMBNESS [   ]    PHYSICAL EXAMINATION:  GENERAL APPEARANCE: NO DISTRESS  HEENT:  NO PALLOR, NO  JVD,  NO   NODES, NECK SUPPLE  CVS: S1 +, S2 +,   RS: AEEB,  OCCASIONAL  RALES +,   NO RONCHI  ABD: SOFT, NT, NO, BS +  EXT: NO PE  SKIN: WARM,   SKELETAL:  ROM ACCEPTABLE  CNS:  AAO X 3    RADIOLOGY :    RADIOLOGY AND READINGS REVIEWED    ASSESSMENT :     Hyperkalemia    HTN (hypertension)    HLD (hyperlipidemia)    DM (diabetes mellitus)    IDDM (insulin dependent diabetes mellitus)    CKD (chronic kidney disease)    No significant past surgical history        PLAN:  HPI:  73F from home, ambulates independently, PMHx HTN, HLD, IDDM, CKD stage 3b (follows nephrologist Dr. Gage Mayorga [phone] ), p/w left flank pain since last night. Describes pain as constant and dull associated with nausea a/w chills. Denies fevers, chills, vomiting, dysuria, hematuria. Denies urinary symptoms prior to yesterday. Denies any history of abdominal surgeries. No reported h/o kidney stones or urinary retention, does not follow urologist.          (2023 14:48)    # SUSPECT PYELONEPHRITIS  # MILD LEFT HYDROURETERONEPHROSIS, OBSTRUCTING LEFT NEPHROLITHIASIS S/P LEFT STENT PLACEMENT     - PLACE ON MEROPENEM, F/U BCX AND UCX  - NOTED CT A/P  - S/P LEFT STENT PLACEMENT    - FLOMAX  - UROLOGY CONSULT  - ID CONSULT    # HYPERKALEMIA - IMPROVED  - S/P LOKELMA, S/P KAYEXYLATE  - S/P TELEMETRY  - NEPHROLOGY CONSULT    # MEDICAL CLARANCE FOR SURGERY  - RCRI - 2 - 10% 30 DAY RISK OF DEATH, MI OR CARDIAC ARREST    # ERIKA ON CKD3  - NOTED UA  - PLACE ON IVF  - MONITOR CR  - AVOID NEPHROTOXIC    # NORMOCYTIC ANEMIA  - TREND HGB    # HTN  # HLD  # IDDM  # GI AND DVT PPX

## 2023-06-30 NOTE — PROGRESS NOTE ADULT - SUBJECTIVE AND OBJECTIVE BOX
[facility] NEPHROLOGY- PROGRESS NOTE    Patient is a 74yo Female with CKD, DM, HTN, HLD  presents with left flank pain x 2 days. Pt a/w ERIKA on CKD, Hyperkalemia and mild left hydro  2/2 obstructed stone. Nephrology consulted for Elevated serum creatinine.  6/29: s/p Cystoscopy with stent placement     Hospital Medications: Medications reviewed.  REVIEW OF SYSTEMS:  CONSTITUTIONAL: No fevers or chills  RESPIRATORY: No shortness of breath  CARDIOVASCULAR: No chest pain.  GASTROINTESTINAL: No nausea, vomiting, diarrhea or abdominal pain.   : +mild left flank pain improving; pt c/o pressure towards the end of mictrition  VASCULAR: No bilateral lower extremity edema.     VITALS:  T(F): 98.8 (06-30-23 @ 11:09), Max: 99.1 (06-30-23 @ 04:06)  HR: 65 (06-30-23 @ 11:09)  BP: 143/58 (06-30-23 @ 11:09)  RR: 18 (06-30-23 @ 11:09)  SpO2: 94% (06-30-23 @ 11:09)  Wt(kg): --  Height (cm): 154.9 (06-29 @ 10:11)  Weight (kg): 70.3 (06-29 @ 10:11)  BMI (kg/m2): 29.3 (06-29 @ 10:11)  BSA (m2): 1.69 (06-29 @ 10:11)    06-29 @ 07:01  -  06-30 @ 07:00  --------------------------------------------------------  IN: 1000 mL / OUT: 501 mL / NET: 499 mL      PHYSICAL EXAM:  Constitutional: NAD  Neurological: Awake and Alert  HEENT: anicteric sclera,   Respiratory: CTAB, no wheezes, rales or rhonchi  Cardiovascular: S1, S2, RRR  Gastrointestinal: BS+, soft, NT/ND  : No das, mild CVA tenderness but improving.  Extremities: No peripheral edema    LABS:  06-30    140  |  110<H>  |  49<H>  ----------------------------<  216<H>  4.9   |  20<L>  |  2.55<H>    Ca    9.0      30 Jun 2023 05:50  Phos  3.1     06-30  Mg     2.1     06-30    TPro  7.6  /  Alb  3.6  /  TBili  0.4  /  DBili      /  AST  22  /  ALT  35  /  AlkPhos  74  06-30    Creatinine Trend: 2.55 <--, 2.93 <--, 2.64 <--                        9.3    12.90 )-----------( 202      ( 30 Jun 2023 05:50 )             27.5     Urine Studies:  Urinalysis Basic - ( 30 Jun 2023 05:50 )    Color:  / Appearance:  / SG:  / pH:   Gluc: 216 mg/dL / Ketone:   / Bili:  / Urobili:    Blood:  / Protein:  / Nitrite:    Leuk Esterase:  / RBC:  / WBC    Sq Epi:  / Non Sq Epi:  / Bacteria:       Osmolality, Random Urine: 359 mos/kg (06-30 @ 05:24)  Sodium, Random Urine: 35 mmol/L (06-30 @ 05:24)  Chloride, Random Urine: 24 mmol/L (06-30 @ 05:24)  Creatinine, Random Urine: 87 mg/dL (06-30 @ 05:24)    RADIOLOGY & ADDITIONAL STUDIES:

## 2023-06-30 NOTE — PROGRESS NOTE ADULT - ASSESSMENT
Very pleasant 73y Female with 5mm L ureteral stone, persistent flank pain, hypertension, hyperkalemia s/p L ureteral stent.    - Labs reviewed - WBC 12.90 from 13.00, Cr 2.55 from 2.93, K 4.9  - CT images reviewed  - HTN control  - Added tamsulosin (flomax) for stent-related discomfort  - Continue abx  - f/u UA, UCx  - Patient will need to follow up outpatient with Dr. Emanuel for ureteroscopy and stent removal Very pleasant 73y Female with 5mm L ureteral stone, persistent flank pain, hypertension, hyperkalemia s/p L ureteral stent.    - Labs reviewed - WBC 12.90 from 13.00, Cr 2.55 from 2.93, K 4.9  - CT images reviewed  - HTN control  - Added tamsulosin (flomax) for stent-related discomfort  - Continue abx  - f/u UA, UCx  - Ureteroscopy as an outpatient  - Follow up with Dr. Emanuel after discharge by calling [phone] or [phone] to schedule an appointment.   [address] [facility]. [address]  [place] [address]  Card given to patient.

## 2023-06-30 NOTE — PROGRESS NOTE ADULT - SUBJECTIVE AND OBJECTIVE BOX
NP Note discussed with  Primary Attending    Patient is a 73y old  Female who presents with a chief complaint of     INTERVAL HPI/OVERNIGHT EVENTS: no acute events overnight    MEDICATIONS  (STANDING):  insulin lispro (ADMELOG) corrective regimen sliding scale   SubCutaneous three times a day before meals  meropenem  IVPB 500 milliGRAM(s) IV Intermittent every 12 hours  sodium chloride 0.9%. 1000 milliLiter(s) (100 mL/Hr) IV Continuous <Continuous>  tamsulosin 0.4 milliGRAM(s) Oral at bedtime    MEDICATIONS  (PRN):  ondansetron Injectable 4 milliGRAM(s) IV Push every 8 hours PRN Nausea and/or Vomiting      __________________________________________________  REVIEW OF SYSTEMS:    CONSTITUTIONAL: No fever,   EYES: no acute visual disturbances  NECK: No pain or stiffness  RESPIRATORY: No cough; No shortness of breath  CARDIOVASCULAR: No chest pain, no palpitations  GASTROINTESTINAL: No pain. No nausea or vomiting; No diarrhea   NEUROLOGICAL: No headache or numbness, no tremors  MUSCULOSKELETAL: No joint pain, no muscle pain  GENITOURINARY: +dysuria  PSYCHIATRY: no depression , no anxiety  ALL OTHER  ROS negative        Vital Signs Last 24 Hrs  T(C): 37.2 (30 Jun 2023 07:25), Max: 37.3 (30 Jun 2023 04:06)  T(F): 99 (30 Jun 2023 07:25), Max: 99.1 (30 Jun 2023 04:06)  HR: 61 (30 Jun 2023 07:25) (61 - 81)  BP: 161/66 (30 Jun 2023 07:25) (134/57 - 203/92)  BP(mean): 95 (29 Jun 2023 20:30) (84 - 104)  RR: 17 (30 Jun 2023 07:25) (12 - 22)  SpO2: 94% (30 Jun 2023 07:25) (94% - 100%)    Parameters below as of 30 Jun 2023 07:25  Patient On (Oxygen Delivery Method): room air        ________________________________________________  PHYSICAL EXAM:  GENERAL: NAD  HEENT: Normocephalic  NECK : supple  CHEST/LUNG: Clear to auscultation bilaterally  HEART: S1 S2  regular  ABDOMEN: Soft, Nontender, Nondistended; Bowel sounds present  EXTREMITIES: no edema  SKIN: warm and dry; no rash  NERVOUS SYSTEM:  Awake and alert; Oriented  to place, person and time    _________________________________________________  LABS:                        9.3    12.90 )-----------( 202      ( 30 Jun 2023 05:50 )             27.5     06-30    140  |  110<H>  |  49<H>  ----------------------------<  216<H>  4.9   |  20<L>  |  2.55<H>    Ca    9.0      30 Jun 2023 05:50  Phos  3.1     06-30  Mg     2.1     06-30    TPro  7.6  /  Alb  3.6  /  TBili  0.4  /  DBili  x   /  AST  22  /  ALT  35  /  AlkPhos  74  06-30    PT/INR - ( 29 Jun 2023 15:20 )   PT: 13.2 sec;   INR: 1.11 ratio         PTT - ( 29 Jun 2023 15:20 )  PTT:25.1 sec  Urinalysis Basic - ( 30 Jun 2023 05:50 )    Color: x / Appearance: x / SG: x / pH: x  Gluc: 216 mg/dL / Ketone: x  / Bili: x / Urobili: x   Blood: x / Protein: x / Nitrite: x   Leuk Esterase: x / RBC: x / WBC x   Sq Epi: x / Non Sq Epi: x / Bacteria: x      CAPILLARY BLOOD GLUCOSE      POCT Blood Glucose.: 200 mg/dL (30 Jun 2023 07:46)  POCT Blood Glucose.: 240 mg/dL (29 Jun 2023 22:42)  POCT Blood Glucose.: 289 mg/dL (29 Jun 2023 19:40)  POCT Blood Glucose.: 336 mg/dL (29 Jun 2023 16:59)        RADIOLOGY & ADDITIONAL TESTS:    < from: CT Abdomen and Pelvis No Cont (06.29.23 @ 10:48) >    FINDINGS:  LOWER CHEST: Coronary artery calcification. Small hiatal hernia..    LIVER: Within normal limits.  BILE DUCTS: Normal caliber.  GALLBLADDER: Within normal limits.  SPLEEN: Within normal limits.  PANCREAS: Within normal limits.  ADRENALS: Within normal limits.  KIDNEYS/URETERS: Mild left hydroureteronephrosis secondary to a 5 mm left   proximal to mid ureteral calculus. There are 2 nonobstructive calculi   left kidney each measuring 7 mm.. Bilateral perinephric stranding left   greater than right.    BLADDER: Within normal limits.  REPRODUCTIVE ORGANS: Unremarkable    BOWEL: No bowel obstruction. Colonic diverticulosis. Appendix normal  PERITONEUM: No ascites.  VESSELS: Nonaneurysmal.  RETROPERITONEUM/LYMPH NODES: No lymphadenopathy.  ABDOMINAL WALL: Small fat-containing umbilical hernia.  BONES: Grade 1 anterolisthesis L4 on L5. Degenerative disc disease L5/S1.    IMPRESSION:  Mild left hydroureteronephrosis secondary to a 5 mm proximal to mid left   ureteral calculus.  Additional nonobstructive left nephrolithiasis.  Colonic diverticulosis without diverticulitis      --- End of Report ---    < end of copied text >    Imaging Personally Reviewed:  YES    Consultant(s) Notes Reviewed:   YES      Plan of care was discussed with patient and /or primary care giver; all questions and concerns were addressed and care was aligned with patient's wishes.

## 2023-06-30 NOTE — PROGRESS NOTE ADULT - ASSESSMENT
73F from home, ambulates independently, PMHx HTN, HLD, IDDM, CKD stage 3b (follows nephrologist Dr. Gage Mayorga [phone] ), p/w left flank pain. CT found to have 5mm obstructive stone with LEFT mild hydroureteronephrosis, also found to have hyperkalemia. Urology following. Admitted for Pyelonephritis 2/2 obstructive kidney stone s/p stent placement 6/29. Hyperkalemia resolved, telemetry d/c'd

## 2023-06-30 NOTE — CONSULT NOTE ADULT - SUBJECTIVE AND OBJECTIVE BOX
HPI:  73F from home, ambulates independently, PMHx HTN, HLD, IDDM, CKD stage 3b (follows nephrologist Dr. Gage Mayorga [phone] ), p/w left flank pain since last night. Describes pain as constant and dull associated with nausea a/w chills. Denies fevers, chills, vomiting, dysuria, hematuria. Denies urinary symptoms prior to yesterday. Denies any history of abdominal surgeries. No reported h/o kidney stones or urinary retention, does not follow urologist.               PAST MEDICAL & SURGICAL HISTORY:  HTN (hypertension)      HLD (hyperlipidemia)      DM (diabetes mellitus)      IDDM (insulin dependent diabetes mellitus)      CKD (chronic kidney disease)      No significant past surgical history          Penicillin Testing Negative (Unknown)  penicillin (Rash)      Meds:  insulin lispro (ADMELOG) corrective regimen sliding scale   SubCutaneous three times a day before meals  meropenem  IVPB 500 milliGRAM(s) IV Intermittent every 12 hours  ondansetron Injectable 4 milliGRAM(s) IV Push every 8 hours PRN  sodium chloride 0.9%. 1000 milliLiter(s) IV Continuous <Continuous>  tamsulosin 0.4 milliGRAM(s) Oral at bedtime      SOCIAL HISTORY:  Smoker:  YES / NO        PACK YEARS:                         WHEN QUIT?  ETOH use:  YES / NO               FREQUENCY / QUANTITY:  [employer]lic[employer] Drug use:  YES / NO  Occupation:  Assisted device use (Cane / Walker):  Live with:    FAMILY HISTORY:      VITALS:  Vital Signs Last 24 Hrs  T(C): 37.1 (30 Jun 2023 11:09), Max: 37.3 (30 Jun 2023 04:06)  T(F): 98.8 (30 Jun 2023 11:09), Max: 99.1 (30 Jun 2023 04:06)  HR: 65 (30 Jun 2023 11:09) (61 - 65)  BP: 143/58 (30 Jun 2023 11:09) (134/57 - 161/66)  BP(mean): 95 (29 Jun 2023 20:30) (95 - 95)  RR: 18 (30 Jun 2023 11:09) (15 - 22)  SpO2: 94% (30 Jun 2023 11:09) (94% - 100%)    Parameters below as of 30 Jun 2023 11:09  Patient On (Oxygen Delivery Method): room air        LABS/DIAGNOSTIC TESTS:                          9.3    12.90 )-----------( 202      ( 30 Jun 2023 05:50 )             27.5     WBC Count: 12.90 K/uL (06-30 @ 05:50)  WBC Count: 13.00 K/uL (06-29 @ 10:35)      06-30    140  |  110<H>  |  49<H>  ----------------------------<  216<H>  4.9   |  20<L>  |  2.55<H>    Ca    9.0      30 Jun 2023 05:50  Phos  3.1     06-30  Mg     2.1     06-30    TPro  7.6  /  Alb  3.6  /  TBili  0.4  /  DBili  x   /  AST  22  /  ALT  35  /  AlkPhos  74  06-30      Urinalysis (06.29.23 @ 14:50)   Glucose Qualitative, Urine: 1000 mg/dL  Blood, Urine: Moderate  pH Urine: 5.0  Color: Yellow  Urine Appearance: Clear  Bilirubin: Negative  Ketone - Urine: Negative  Specific Gravity: 1.015  Protein, Urine: 100 mg/dL  Urobilinogen: Negative  Nitrite: Negative  Leukocyte Esterase Concentration: NegativeUrine Microscopic-Add On (NC) (06.29.23 @ 14:50)   Red Blood Cell - Urine: 10-25 /HPF  Bacteria: Few /HPF  White Blood Cell - Urine: 3-5 /HPF  Squamous Epithelial Cells: Occasional /HPF      LIVER FUNCTIONS - ( 30 Jun 2023 05:50 )  Alb: 3.6 g/dL / Pro: 7.6 g/dL / ALK PHOS: 74 U/L / ALT: 35 U/L DA / AST: 22 U/L / GGT: x             PT/INR - ( 29 Jun 2023 15:20 )   PT: 13.2 sec;   INR: 1.11 ratio         PTT - ( 29 Jun 2023 15:20 )  PTT:25.1 sec    LACTATE:    ABG -     CULTURES:       RADIOLOGY:< from: CT Abdomen and Pelvis No Cont (06.29.23 @ 10:48) >  ACC: [id number] EXAM:  CT ABDOMEN AND PELVIS   ORDERED BY: REGINA ARREDONDO     PROCEDURE DATE:  06/29/2023          INTERPRETATION:  CLINICAL INFORMATION: Left flank pain    COMPARISON: None.    CONTRAST/COMPLICATIONS:  IV Contrast: NONE  Oral Contrast: NONE  Complications: None reported at time of study completion    PROCEDURE:  CT of the Abdomen and Pelvis was performed.  Sagittal and coronal reformats were performed.    FINDINGS:  LOWER CHEST: Coronary artery calcification. Small hiatal hernia..    LIVER: Within normal limits.  BILE DUCTS: Normal caliber.  GALLBLADDER: Within normal limits.  SPLEEN: Within normal limits.  PANCREAS: Within normal limits.  ADRENALS: Within normal limits.  KIDNEYS/URETERS: Mild left hydroureteronephrosis secondary to a 5 mm left   proximal to mid ureteral calculus. There are 2 nonobstructive calculi   left kidney each measuring 7 mm.. Bilateral perinephric stranding left   greater than right.    BLADDER: Within normal limits.  REPRODUCTIVE ORGANS: Unremarkable    BOWEL: No bowel obstruction. Colonic diverticulosis. Appendix normal  PERITONEUM: No ascites.  VESSELS: Nonaneurysmal.  RETROPERITONEUM/LYMPH NODES: No lymphadenopathy.  ABDOMINAL WALL: Small fat-containing umbilical hernia.  BONES: Grade 1 anterolisthesis L4 on L5. Degenerative disc disease L5/S1.    IMPRESSION:  Mild left hydroureteronephrosis secondary to a 5 mm proximal to mid left   ureteral calculus.  Additional nonobstructive left nephrolithiasis.  Colonic diverticulosis without diverticulitis      --- End of Report ---            JOHN EDGE MD; Attending Radiologist  This document has been electronically signed. Jun 29 2023 11:00AM    < end of copied text >        ROS  [  ] UNABLE TO ELICIT               HPI:  73F from home, ambulates independently, PMHx HTN, HLD, IDDM, CKD stage 3b (follows nephrologist Dr. Gage Mayorga [phone] ), p/w left flank pain since last night. Describes pain as constant and dull associated with nausea a/w chills. Denies fevers, chills, vomiting, dysuria, hematuria. Denies urinary symptoms prior to yesterday. Denies any history of abdominal surgeries. No reported h/o kidney stones or urinary retention, does not follow urologist.         History as above, asked to see this patient who presented to the hospital with left flank pain and hematuria x 1 day  prior to admission, she denies any Dysuria until after her left ureteral stent was placed and has already gone away, she denies any frequency or urgency of urination, she has no fevers or chills, no abdominal pain but still has left flank pain. Her hematuria resolved and has now returned. She was placed on Meropenem for a possible Pyelonephritis and because she had a rash to penicillin decades ago. Her U/A is negative for a UTI.          PAST MEDICAL & SURGICAL HISTORY:  HTN (hypertension)      HLD (hyperlipidemia)      DM (diabetes mellitus)      IDDM (insulin dependent diabetes mellitus)      CKD (chronic kidney disease)      No significant past surgical history          Penicillin Testing Negative (Unknown)  penicillin (Rash)      Meds:  insulin lispro (ADMELOG) corrective regimen sliding scale   SubCutaneous three times a day before meals  meropenem  IVPB 500 milliGRAM(s) IV Intermittent every 12 hours  ondansetron Injectable 4 milliGRAM(s) IV Push every 8 hours PRN  sodium chloride 0.9%. 1000 milliLiter(s) IV Continuous <Continuous>  tamsulosin 0.4 milliGRAM(s) Oral at bedtime      SOCIAL HISTORY:  Smoker:  no  ETOH use:  no      FAMILY HISTORY: not contributory      VITALS:  Vital Signs Last 24 Hrs  T(C): 37.1 (30 Jun 2023 11:09), Max: 37.3 (30 Jun 2023 04:06)  T(F): 98.8 (30 Jun 2023 11:09), Max: 99.1 (30 Jun 2023 04:06)  HR: 65 (30 Jun 2023 11:09) (61 - 65)  BP: 143/58 (30 Jun 2023 11:09) (134/57 - 161/66)  BP(mean): 95 (29 Jun 2023 20:30) (95 - 95)  RR: 18 (30 Jun 2023 11:09) (15 - 22)  SpO2: 94% (30 Jun 2023 11:09) (94% - 100%)    Parameters below as of 30 Jun 2023 11:09  Patient On (Oxygen Delivery Method): room air        LABS/DIAGNOSTIC TESTS:                          9.3    12.90 )-----------( 202      ( 30 Jun 2023 05:50 )             27.5     WBC Count: 12.90 K/uL (06-30 @ 05:50)  WBC Count: 13.00 K/uL (06-29 @ 10:35)      06-30    140  |  110<H>  |  49<H>  ----------------------------<  216<H>  4.9   |  20<L>  |  2.55<H>    Ca    9.0      30 Jun 2023 05:50  Phos  3.1     06-30  Mg     2.1     06-30    TPro  7.6  /  Alb  3.6  /  TBili  0.4  /  DBili  x   /  AST  22  /  ALT  35  /  AlkPhos  74  06-30      Urinalysis (06.29.23 @ 14:50)   Glucose Qualitative, Urine: 1000 mg/dL  Blood, Urine: Moderate  pH Urine: 5.0  Color: Yellow  Urine Appearance: Clear  Bilirubin: Negative  Ketone - Urine: Negative  Specific Gravity: 1.015  Protein, Urine: 100 mg/dL  Urobilinogen: Negative  Nitrite: Negative  Leukocyte Esterase Concentration: NegativeUrine Microscopic-Add On (NC) (06.29.23 @ 14:50)   Red Blood Cell - Urine: 10-25 /HPF  Bacteria: Few /HPF  White Blood Cell - Urine: 3-5 /HPF  Squamous Epithelial Cells: Occasional /HPF      LIVER FUNCTIONS - ( 30 Jun 2023 05:50 )  Alb: 3.6 g/dL / Pro: 7.6 g/dL / ALK PHOS: 74 U/L / ALT: 35 U/L DA / AST: 22 U/L / GGT: x             PT/INR - ( 29 Jun 2023 15:20 )   PT: 13.2 sec;   INR: 1.11 ratio         PTT - ( 29 Jun 2023 15:20 )  PTT:25.1 sec    LACTATE:    ABG -     CULTURES:       RADIOLOGY:< from: CT Abdomen and Pelvis No Cont (06.29.23 @ 10:48) >  ACC: [id number] EXAM:  CT ABDOMEN AND PELVIS   ORDERED BY: JANNYAR MARIA ELENA     PROCEDURE DATE:  06/29/2023          INTERPRETATION:  CLINICAL INFORMATION: Left flank pain    COMPARISON: None.    CONTRAST/COMPLICATIONS:  IV Contrast: NONE  Oral Contrast: NONE  Complications: None reported at time of study completion    PROCEDURE:  CT of the Abdomen and Pelvis was performed.  Sagittal and coronal reformats were performed.    FINDINGS:  LOWER CHEST: Coronary artery calcification. Small hiatal hernia..    LIVER: Within normal limits.  BILE DUCTS: Normal caliber.  GALLBLADDER: Within normal limits.  SPLEEN: Within normal limits.  PANCREAS: Within normal limits.  ADRENALS: Within normal limits.  KIDNEYS/URETERS: Mild left hydroureteronephrosis secondary to a 5 mm left   proximal to mid ureteral calculus. There are 2 nonobstructive calculi   left kidney each measuring 7 mm.. Bilateral perinephric stranding left   greater than right.    BLADDER: Within normal limits.  REPRODUCTIVE ORGANS: Unremarkable    BOWEL: No bowel obstruction. Colonic diverticulosis. Appendix normal  PERITONEUM: No ascites.  VESSELS: Nonaneurysmal.  RETROPERITONEUM/LYMPH NODES: No lymphadenopathy.  ABDOMINAL WALL: Small fat-containing umbilical hernia.  BONES: Grade 1 anterolisthesis L4 on L5. Degenerative disc disease L5/S1.    IMPRESSION:  Mild left hydroureteronephrosis secondary to a 5 mm proximal to mid left   ureteral calculus.  Additional nonobstructive left nephrolithiasis.  Colonic diverticulosis without diverticulitis      --- End of Report ---            JOHN EDGE MD; Attending Radiologist  This document has been electronically signed. Jun 29 2023 11:00AM    < end of copied text >        ROS  [  ] UNABLE TO ELICIT

## 2023-06-30 NOTE — CONSULT NOTE ADULT - ASSESSMENT
UTI/ Pyelonephritis - does not appear to have any infection at this time  Leukocytosis - mild      Plan - Cont Meropenem 500mgs iv q12hrs for now  if all cultures are negative will likely DC all antibiotics.

## 2023-06-30 NOTE — PROGRESS NOTE ADULT - SUBJECTIVE AND OBJECTIVE BOX
Subjective  No acute events overnight. Has mild dysuria and frequency. Initially saw blood in urine but now improving. Pain well controlled.    Objective    Vital signs  T(F): , Max: 99.1 (06-30-23 @ 04:06)  HR: 61 (06-30-23 @ 07:25)  BP: 161/66 (06-30-23 @ 07:25)  SpO2: 94% (06-30-23 @ 07:25)  Wt(kg): --    Output     OUT:    Voided (mL): 501 mL  Total OUT: 501 mL    Total NET: -501 mL          Gen: NAD  Abd: soft, nontender, nondistended  : Voiding spontaneously    Labs      06-30 @ 05:50    WBC 12.90 / Hct 27.5  / SCr 2.55     06-29 @ 15:20    WBC --    / Hct --    / SCr 2.93

## 2023-06-30 NOTE — PROGRESS NOTE ADULT - ASSESSMENT
Patient is a 72yo Female with CKD, DM, HTN, HLD  presents with left flank pain x 2 days. Pt a/w ERIKA on CKD, Hyperkalemia and mild left hydro  2/2 obstructed stone. Nephrology consulted for Elevated serum creatinine.    1. ERIKA- Likely hemodynamically with Lasix/ ?Losartan use. UA with 100 protein and mod blood; microscopic hematuria likely due to stones. Spot Upr/Cr 3.28, FeUrea 28.5%; consistent with pre-renal ERIKA. c/w NS IVF @ 100cc/hr.   Avoid LR IV due to hyperkalemia. CT abd/pelvis with mild Left hydro secondary to a 5 mm proximal to mid left ureteral calculus. Additional nonobstructive left nephrolithiasis.   Unilateral hydro does not cause ERIKA. Strict I/Os. Avoid nephrotoxins/ NSAIDs/ RCA. Monitor BMP.  2. CKD-3b- Pt with presumed diabetic/ hypertensive nephropathy for which pt follows with Dr. Gage Mayorga. Spoke with his office [phone] last SCr 1.8 on 4/12/23. Avoid nephrotoxins  3. Hypertensive urgency- BP improved. Can consider Hydralazine if hypertensive. Continue to hold ARB due to ERIKA.  c/w low salt diet. Monitor BP  4. Hyperkalemia- resolved s/p Lokelma. Please avoid LR IVF. c/w low potassium diet. Monitor serum potassium  5. Left hydro 2/2 obstructed stone; mild hydro, s/p L stent placement with Dr. Emanuel on 6/29. Now on flomax. Urology following.         [facility] NEPHROLOGY  Zhou Mijares M.D.  Mando Hein D.O.  Claudia Krause M.D.  Rand George, MSN, ANP-C  [phone]  Fax: [phone] [place] [address], [address]  [place] [address]   Patient is a 72yo Female with CKD, DM, HTN, HLD  presents with left flank pain x 2 days. Pt a/w ERIKA on CKD, Hyperkalemia and mild left hydro  2/2 obstructed stone. Nephrology consulted for Elevated serum creatinine.    1. ERIKA- Likely hemodynamically with Lasix/ ?Losartan use. UA with 100 protein and mod blood; microscopic hematuria likely due to stones. Spot Upr/Cr 3.28, FeUrea 28.5%; consistent with pre-renal ERIKA. c/w NS IVF @ 100cc/hr.   Avoid LR IV due to hyperkalemia. CT abd/pelvis with mild Left hydro secondary to a 5 mm proximal to mid left ureteral calculus. Additional nonobstructive left nephrolithiasis.   Unilateral hydro does not cause ERIKA. Strict I/Os. Avoid nephrotoxins/ NSAIDs/ RCA. Monitor BMP.  2. CKD-3b- Pt with presumed diabetic/ hypertensive nephropathy for which pt follows with Dr. Gage Mayorga. Spoke with his office [phone] last SCr 1.8 on 4/12/23. Spot UPr/Cr 3.28; pt will benefit with resuming ARB once renal function is back to baseline. Avoid nephrotoxins  3. Hypertensive urgency- BP improved. Can consider Hydralazine if hypertensive. Continue to hold ARB due to ERIKA.  c/w low salt diet. Monitor BP  4. Hyperkalemia- resolved s/p Lokelma. Please avoid LR IVF. c/w low potassium diet. Monitor serum potassium  5. Left hydro 2/2 obstructed stone; mild hydro, s/p L stent placement with Dr. Emanuel on 6/29. Now on flomax. Urology following.         [facility] NEPHROLOGY  Zhou Mijares M.D.  Mando Hein D.O.  Claudia Krause M.D.  Rand George, MSN, ANP-C  [phone]  Fax: [phone] [place] [address], [address]  [place] [address]

## 2023-06-30 NOTE — CHART NOTE - NSCHARTNOTEFT_GEN_A_CORE
Paged by nursing staff pt noted to be hypertensive, not currently in pain. Currently holding pts losartan and lasix. Started pt on hydralazine 10 can increase as required.

## 2023-07-01 LAB
ALBUMIN SERPL ELPH-MCNC: 3.2 G/DL — LOW (ref 3.5–5)
ALP SERPL-CCNC: 72 U/L — SIGNIFICANT CHANGE UP (ref 40–120)
ALT FLD-CCNC: 31 U/L DA — SIGNIFICANT CHANGE UP (ref 10–60)
ANION GAP SERPL CALC-SCNC: 9 MMOL/L — SIGNIFICANT CHANGE UP (ref 5–17)
AST SERPL-CCNC: 20 U/L — SIGNIFICANT CHANGE UP (ref 10–40)
BILIRUB SERPL-MCNC: 0.5 MG/DL — SIGNIFICANT CHANGE UP (ref 0.2–1.2)
BUN SERPL-MCNC: 46 MG/DL — HIGH (ref 7–18)
CALCIUM SERPL-MCNC: 8.4 MG/DL — SIGNIFICANT CHANGE UP (ref 8.4–10.5)
CHLORIDE SERPL-SCNC: 111 MMOL/L — HIGH (ref 96–108)
CO2 SERPL-SCNC: 20 MMOL/L — LOW (ref 22–31)
CREAT SERPL-MCNC: 1.94 MG/DL — HIGH (ref 0.5–1.3)
CULTURE RESULTS: NO GROWTH — SIGNIFICANT CHANGE UP
CULTURE RESULTS: SIGNIFICANT CHANGE UP
EGFR: 27 ML/MIN/1.73M2 — LOW
GLUCOSE BLDC GLUCOMTR-MCNC: 125 MG/DL — HIGH (ref 70–99)
GLUCOSE BLDC GLUCOMTR-MCNC: 171 MG/DL — HIGH (ref 70–99)
GLUCOSE BLDC GLUCOMTR-MCNC: 197 MG/DL — HIGH (ref 70–99)
GLUCOSE BLDC GLUCOMTR-MCNC: 207 MG/DL — HIGH (ref 70–99)
GLUCOSE SERPL-MCNC: 178 MG/DL — HIGH (ref 70–99)
HCT VFR BLD CALC: 27.7 % — LOW (ref 34.5–45)
HGB BLD-MCNC: 9.3 G/DL — LOW (ref 11.5–15.5)
MCHC RBC-ENTMCNC: 33.1 PG — SIGNIFICANT CHANGE UP (ref 27–34)
MCHC RBC-ENTMCNC: 33.6 GM/DL — SIGNIFICANT CHANGE UP (ref 32–36)
MCV RBC AUTO: 98.6 FL — SIGNIFICANT CHANGE UP (ref 80–100)
NRBC # BLD: 0 /100 WBCS — SIGNIFICANT CHANGE UP (ref 0–0)
PLATELET # BLD AUTO: 199 K/UL — SIGNIFICANT CHANGE UP (ref 150–400)
POTASSIUM SERPL-MCNC: 4.3 MMOL/L — SIGNIFICANT CHANGE UP (ref 3.5–5.3)
POTASSIUM SERPL-SCNC: 4.3 MMOL/L — SIGNIFICANT CHANGE UP (ref 3.5–5.3)
PROT SERPL-MCNC: 7.1 G/DL — SIGNIFICANT CHANGE UP (ref 6–8.3)
RBC # BLD: 2.81 M/UL — LOW (ref 3.8–5.2)
RBC # FLD: 12.6 % — SIGNIFICANT CHANGE UP (ref 10.3–14.5)
SODIUM SERPL-SCNC: 140 MMOL/L — SIGNIFICANT CHANGE UP (ref 135–145)
SPECIMEN SOURCE: SIGNIFICANT CHANGE UP
SPECIMEN SOURCE: SIGNIFICANT CHANGE UP
WBC # BLD: 14.16 K/UL — HIGH (ref 3.8–10.5)
WBC # FLD AUTO: 14.16 K/UL — HIGH (ref 3.8–10.5)

## 2023-07-01 PROCEDURE — 99232 SBSQ HOSP IP/OBS MODERATE 35: CPT

## 2023-07-01 RX ORDER — HYDRALAZINE HCL 50 MG
50 TABLET ORAL THREE TIMES A DAY
Refills: 0 | Status: DISCONTINUED | OUTPATIENT
Start: 2023-07-01 | End: 2023-07-01

## 2023-07-01 RX ORDER — HYDRALAZINE HCL 50 MG
50 TABLET ORAL THREE TIMES A DAY
Refills: 0 | Status: DISCONTINUED | OUTPATIENT
Start: 2023-07-01 | End: 2023-07-02

## 2023-07-01 RX ORDER — ACETAMINOPHEN 500 MG
650 TABLET ORAL ONCE
Refills: 0 | Status: COMPLETED | OUTPATIENT
Start: 2023-07-01 | End: 2023-07-01

## 2023-07-01 RX ADMIN — TAMSULOSIN HYDROCHLORIDE 0.4 MILLIGRAM(S): 0.4 CAPSULE ORAL at 21:30

## 2023-07-01 RX ADMIN — Medication 650 MILLIGRAM(S): at 22:31

## 2023-07-01 RX ADMIN — Medication 1: at 17:12

## 2023-07-01 RX ADMIN — Medication 1: at 08:18

## 2023-07-01 RX ADMIN — Medication 50 MILLIGRAM(S): at 17:26

## 2023-07-01 RX ADMIN — SODIUM CHLORIDE 100 MILLILITER(S): 9 INJECTION INTRAMUSCULAR; INTRAVENOUS; SUBCUTANEOUS at 12:59

## 2023-07-01 RX ADMIN — Medication 10 MILLIGRAM(S): at 00:11

## 2023-07-01 RX ADMIN — Medication 2: at 12:58

## 2023-07-01 RX ADMIN — Medication 650 MILLIGRAM(S): at 21:27

## 2023-07-01 RX ADMIN — Medication 50 MILLIGRAM(S): at 21:29

## 2023-07-01 RX ADMIN — MEROPENEM 100 MILLIGRAM(S): 1 INJECTION INTRAVENOUS at 12:58

## 2023-07-01 RX ADMIN — Medication 10 MILLIGRAM(S): at 05:26

## 2023-07-01 RX ADMIN — MEROPENEM 100 MILLIGRAM(S): 1 INJECTION INTRAVENOUS at 23:57

## 2023-07-01 NOTE — PROGRESS NOTE ADULT - ASSESSMENT
73y Female with 5mm L ureteral stone, persistent flank pain, hypertension, hyperkalemia s/p L ureteral stent.    - Continue w/ tamsulosin (flomax) for stent-related discomfort  - Continue abx  - f/u UA, UCx  - Ureteroscopy as an outpatient  - Follow up with Dr. Emanuel after discharge by calling [phone] or [phone] to schedule an appointment.   [address] [facility]  [place] [address]  Card given to patient.     73y Female with 5mm L ureteral stone, persistent flank pain, hypertension, hyperkalemia s/p L ureteral stent.    - Continue w/ tamsulosin (flomax) for stent-related discomfort  - Continue abx  - f/u UA, UCx  - Ureteroscopy as an outpatient  - Follow up with Dr. Emanuel after discharge by calling [phone] or [phone] to schedule an appointment.   [address] [facility]  [place] [address]

## 2023-07-01 NOTE — PROGRESS NOTE ADULT - SUBJECTIVE AND OBJECTIVE BOX
[facility] NEPHROLOGY- PROGRESS NOTE    Patient is a 72yo Female with CKD, DM, HTN, HLD  presents with left flank pain x 2 days. Pt a/w ERIKA on CKD, Hyperkalemia and mild left hydro  2/2 obstructed stone. Nephrology consulted for Elevated serum creatinine.  6/29: s/p Cystoscopy with stent placement     Hospital Medications: Medications reviewed.  REVIEW OF SYSTEMS:  CONSTITUTIONAL: No fevers or chills  RESPIRATORY: No shortness of breath  CARDIOVASCULAR: No chest pain.  GASTROINTESTINAL: No nausea, vomiting, diarrhea or abdominal pain.   : +left flank pain resolved; pt c/o pressure towards the end of mictrition with urgency  VASCULAR: No bilateral lower extremity edema.     VITALS:  T(F): 98.4 (07-01-23 @ 10:52), Max: 99.7 (06-30-23 @ 20:05)  HR: 77 (07-01-23 @ 10:52)  BP: 169/74 (07-01-23 @ 10:52)  RR: 17 (07-01-23 @ 10:52)  SpO2: 97% (07-01-23 @ 10:52)  Wt(kg): --    06-30 @ 07:01  -  07-01 @ 07:00  --------------------------------------------------------  IN: 0 mL / OUT: 2 mL / NET: -2 mL        PHYSICAL EXAM:  Constitutional: NAD  Neurological: Awake and Alert  HEENT: anicteric sclera,   Respiratory: CTAB, no wheezes, rales or rhonchi  Cardiovascular: S1, S2, RRR  Gastrointestinal: BS+, soft, NT/ND  : No das, No CVA tenderness   Extremities: LE fullness b/l      LABS:  07-01    140  |  111<H>  |  46<H>  ----------------------------<  178<H>  4.3   |  20<L>  |  1.94<H>    Ca    8.4      01 Jul 2023 06:31  Phos  3.1     06-30  Mg     2.1     06-30    TPro  7.1  /  Alb  3.2<L>  /  TBili  0.5  /  DBili      /  AST  20  /  ALT  31  /  AlkPhos  72  07-01    Creatinine Trend: 1.94 <--, 2.55 <--, 2.93 <--, 2.64 <--                        9.3    14.16 )-----------( 199      ( 01 Jul 2023 06:31 )             27.7     Urine Studies:  Urinalysis Basic - ( 01 Jul 2023 06:31 )    Color:  / Appearance:  / SG:  / pH:   Gluc: 178 mg/dL / Ketone:   / Bili:  / Urobili:    Blood:  / Protein:  / Nitrite:    Leuk Esterase:  / RBC:  / WBC    Sq Epi:  / Non Sq Epi:  / Bacteria:       Osmolality, Random Urine: 359 mos/kg (06-30 @ 05:24)  Sodium, Random Urine: 35 mmol/L (06-30 @ 05:24)  Chloride, Random Urine: 24 mmol/L (06-30 @ 05:24)  Creatinine, Random Urine: 87 mg/dL (06-30 @ 05:24)

## 2023-07-01 NOTE — PROGRESS NOTE ADULT - SUBJECTIVE AND OBJECTIVE BOX
Patient is a 73y old  Female who presents with a chief complaint of   PATIENT IS SEEN AND EXAMINED IN MEDICAL FLOOR.  NGT [    ]    MOISES [   ]      GT [   ]    ALLERGIES:  Penicillin Testing Negative (Unknown)  penicillin (Rash)      Daily     Daily     VITALS:    Vital Signs Last 24 Hrs  T(C): 36.9 (01 Jul 2023 10:52), Max: 37.6 (30 Jun 2023 20:05)  T(F): 98.4 (01 Jul 2023 10:52), Max: 99.7 (30 Jun 2023 20:05)  HR: 77 (01 Jul 2023 10:52) (69 - 78)  BP: 169/74 (01 Jul 2023 10:52) (140/70 - 178/75)  BP(mean): 93 (01 Jul 2023 04:30) (90 - 103)  RR: 17 (01 Jul 2023 10:52) (16 - 18)  SpO2: 97% (01 Jul 2023 10:52) (93% - 97%)    Parameters below as of 01 Jul 2023 10:52  Patient On (Oxygen Delivery Method): room air        LABS:    CBC Full  -  ( 01 Jul 2023 06:31 )  WBC Count : 14.16 K/uL  RBC Count : 2.81 M/uL  Hemoglobin : 9.3 g/dL  Hematocrit : 27.7 %  Platelet Count - Automated : 199 K/uL  Mean Cell Volume : 98.6 fl  Mean Cell Hemoglobin : 33.1 pg  Mean Cell Hemoglobin Concentration : 33.6 gm/dL  Auto Neutrophil # : x  Auto Lymphocyte # : x  Auto Monocyte # : x  Auto Eosinophil # : x  Auto Basophil # : x  Auto Neutrophil % : x  Auto Lymphocyte % : x  Auto Monocyte % : x  Auto Eosinophil % : x  Auto Basophil % : x    PT/INR - ( 29 Jun 2023 15:20 )   PT: 13.2 sec;   INR: 1.11 ratio         PTT - ( 29 Jun 2023 15:20 )  PTT:25.1 sec  07-01    140  |  111<H>  |  46<H>  ----------------------------<  178<H>  4.3   |  20<L>  |  1.94<H>    Ca    8.4      01 Jul 2023 06:31  Phos  3.1     06-30  Mg     2.1     06-30    TPro  7.1  /  Alb  3.2<L>  /  TBili  0.5  /  DBili  x   /  AST  20  /  ALT  31  /  AlkPhos  72  07-01    CAPILLARY BLOOD GLUCOSE      POCT Blood Glucose.: 207 mg/dL (01 Jul 2023 12:04)  POCT Blood Glucose.: 197 mg/dL (01 Jul 2023 07:36)  POCT Blood Glucose.: 124 mg/dL (30 Jun 2023 20:53)  POCT Blood Glucose.: 208 mg/dL (30 Jun 2023 16:43)        LIVER FUNCTIONS - ( 01 Jul 2023 06:31 )  Alb: 3.2 g/dL / Pro: 7.1 g/dL / ALK PHOS: 72 U/L / ALT: 31 U/L DA / AST: 20 U/L / GGT: x           Creatinine Trend: 1.94<--, 2.55<--, 2.93<--, 2.64<--  I&O's Summary    30 Jun 2023 07:01  -  01 Jul 2023 07:00  --------------------------------------------------------  IN: 0 mL / OUT: 2 mL / NET: -2 mL            OR Collect Bladder (from O.R.)  06-29 @ 17:45   No growth  --  --      .Blood Blood-Peripheral  06-29 @ 17:00   No growth at 24 hours  --  --      .Blood Blood-Peripheral  06-29 @ 16:20   No growth at 24 hours  --  --      Clean Catch Clean Catch (Midstream)  06-29 @ 14:50   <10,000 CFU/mL Normal Urogenital Lola  --  --          MEDICATIONS:    MEDICATIONS  (STANDING):  hydrALAZINE 50 milliGRAM(s) Oral three times a day  insulin lispro (ADMELOG) corrective regimen sliding scale   SubCutaneous three times a day before meals  meropenem  IVPB 500 milliGRAM(s) IV Intermittent every 12 hours  tamsulosin 0.4 milliGRAM(s) Oral at bedtime      MEDICATIONS  (PRN):  ondansetron Injectable 4 milliGRAM(s) IV Push every 8 hours PRN Nausea and/or Vomiting      REVIEW OF SYSTEMS:                           ALL ROS DONE [ X   ]    CONSTITUTIONAL:  LETHARGIC [   ], FEVER [   ], UNRESPONSIVE [   ]  CVS:  CP  [   ], SOB, [   ], PALPITATIONS [   ], DIZZYNESS [   ]  RS: COUGH [   ], SPUTUM [   ]  GI: ABDOMINAL PAIN [   ], NAUSEA [   ], VOMITINGS [   ], DIARRHEA [   ], CONSTIPATION [   ]  :  DYSURIA [   ], NOCTURIA [   ], INCREASED FREQUENCY [   ], DRIBLING [   ],  SKELETAL: PAINFUL JOINTS [   ], SWOLLEN JOINTS [   ], NECK ACHE [   ], LOW BACK ACHE [   ],  SKIN : ULCERS [   ], RASH [   ], ITCHING [   ]  CNS: HEAD ACHE [   ], DOUBLE VISION [   ], BLURRED VISION [   ], AMS / CONFUSION [   ], SEIZURES [   ], WEAKNESS [   ],TINGLING / NUMBNESS [   ]    PHYSICAL EXAMINATION:  GENERAL APPEARANCE: NO DISTRESS  HEENT:  NO PALLOR, NO  JVD,  NO   NODES, NECK SUPPLE  CVS: S1 +, S2 +,   RS: AEEB,  OCCASIONAL  RALES +,   NO RONCHI  ABD: SOFT, NT, NO, BS +  EXT: NO PE  SKIN: WARM,   SKELETAL:  ROM ACCEPTABLE  CNS:  AAO X 3    RADIOLOGY :    RADIOLOGY AND READINGS REVIEWED    ASSESSMENT :     Hyperkalemia    HTN (hypertension)    HLD (hyperlipidemia)    DM (diabetes mellitus)    IDDM (insulin dependent diabetes mellitus)    CKD (chronic kidney disease)    No significant past surgical history        PLAN:  HPI:  73F from home, ambulates independently, PMHx HTN, HLD, IDDM, CKD stage 3b (follows nephrologist Dr. Gage Mayorga [phone] ), p/w left flank pain since last night. Describes pain as constant and dull associated with nausea a/w chills. Denies fevers, chills, vomiting, dysuria, hematuria. Denies urinary symptoms prior to yesterday. Denies any history of abdominal surgeries. No reported h/o kidney stones or urinary retention, does not follow urologist.          (29 Jun 2023 14:48)    # SUSPECT PYELONEPHRITIS  # MILD LEFT HYDROURETERONEPHROSIS, OBSTRUCTING LEFT NEPHROLITHIASIS S/P LEFT STENT PLACEMENT 6/29    - PLACE ON MEROPENEM, F/U BCX AND UCX  - NOTED CT A/P  - S/P LEFT STENT PLACEMENT 6/29   - FLOMAX  - UROLOGY CONSULT  - ID CONSULT    # HYPERKALEMIA - IMPROVED  - S/P LOKELMA, S/P KAYEXYLATE  - S/P TELEMETRY  - NEPHROLOGY CONSULT    # MEDICAL CLARANCE FOR SURGERY  - RCRI - 2 - 10% 30 DAY RISK OF DEATH, MI OR CARDIAC ARREST    # ERIKA ON CKD3  - NOTED UA  - PLACE ON IVF  - MONITOR CR  - AVOID NEPHROTOXIC    # NORMOCYTIC ANEMIA  - TREND HGB    # HTN  # HLD  # IDDM  # GI AND DVT PPX   Patient is a 73y old  Female who presents with a chief complaint of FLANK PAIN.  PATIENT IS SEEN AND EXAMINED IN MEDICAL FLOOR.      ALLERGIES:  Penicillin Testing Negative (Unknown)  penicillin (Rash)      VITALS:    Vital Signs Last 24 Hrs  T(C): 36.9 (01 Jul 2023 10:52), Max: 37.6 (30 Jun 2023 20:05)  T(F): 98.4 (01 Jul 2023 10:52), Max: 99.7 (30 Jun 2023 20:05)  HR: 77 (01 Jul 2023 10:52) (69 - 78)  BP: 169/74 (01 Jul 2023 10:52) (140/70 - 178/75)  BP(mean): 93 (01 Jul 2023 04:30) (90 - 103)  RR: 17 (01 Jul 2023 10:52) (16 - 18)  SpO2: 97% (01 Jul 2023 10:52) (93% - 97%)    Parameters below as of 01 Jul 2023 10:52  Patient On (Oxygen Delivery Method): room air        LABS:    CBC Full  -  ( 01 Jul 2023 06:31 )  WBC Count : 14.16 K/uL  RBC Count : 2.81 M/uL  Hemoglobin : 9.3 g/dL  Hematocrit : 27.7 %  Platelet Count - Automated : 199 K/uL  Mean Cell Volume : 98.6 fl  Mean Cell Hemoglobin : 33.1 pg  Mean Cell Hemoglobin Concentration : 33.6 gm/dL  Auto Neutrophil # : x  Auto Lymphocyte # : x  Auto Monocyte # : x  Auto Eosinophil # : x  Auto Basophil # : x  Auto Neutrophil % : x  Auto Lymphocyte % : x  Auto Monocyte % : x  Auto Eosinophil % : x  Auto Basophil % : x    PT/INR - ( 29 Jun 2023 15:20 )   PT: 13.2 sec;   INR: 1.11 ratio         PTT - ( 29 Jun 2023 15:20 )  PTT:25.1 sec  07-01    140  |  111<H>  |  46<H>  ----------------------------<  178<H>  4.3   |  20<L>  |  1.94<H>    Ca    8.4      01 Jul 2023 06:31  Phos  3.1     06-30  Mg     2.1     06-30    TPro  7.1  /  Alb  3.2<L>  /  TBili  0.5  /  DBili  x   /  AST  20  /  ALT  31  /  AlkPhos  72  07-01    CAPILLARY BLOOD GLUCOSE      POCT Blood Glucose.: 207 mg/dL (01 Jul 2023 12:04)  POCT Blood Glucose.: 197 mg/dL (01 Jul 2023 07:36)  POCT Blood Glucose.: 124 mg/dL (30 Jun 2023 20:53)  POCT Blood Glucose.: 208 mg/dL (30 Jun 2023 16:43)        LIVER FUNCTIONS - ( 01 Jul 2023 06:31 )  Alb: 3.2 g/dL / Pro: 7.1 g/dL / ALK PHOS: 72 U/L / ALT: 31 U/L DA / AST: 20 U/L / GGT: x           Creatinine Trend: 1.94<--, 2.55<--, 2.93<--, 2.64<--  I&O's Summary    30 Jun 2023 07:01  -  01 Jul 2023 07:00  --------------------------------------------------------  IN: 0 mL / OUT: 2 mL / NET: -2 mL            OR Collect Bladder (from O.R.)  06-29 @ 17:45   No growth  --  --      .Blood Blood-Peripheral  06-29 @ 17:00   No growth at 24 hours  --  --      .Blood Blood-Peripheral  06-29 @ 16:20   No growth at 24 hours  --  --      Clean Catch Clean Catch (Midstream)  06-29 @ 14:50   <10,000 CFU/mL Normal Urogenital Lola  --  --          MEDICATIONS:    MEDICATIONS  (STANDING):  hydrALAZINE 50 milliGRAM(s) Oral three times a day  insulin lispro (ADMELOG) corrective regimen sliding scale   SubCutaneous three times a day before meals  meropenem  IVPB 500 milliGRAM(s) IV Intermittent every 12 hours  tamsulosin 0.4 milliGRAM(s) Oral at bedtime      MEDICATIONS  (PRN):  ondansetron Injectable 4 milliGRAM(s) IV Push every 8 hours PRN Nausea and/or Vomiting      REVIEW OF SYSTEMS:                           ALL ROS DONE [ X   ]    CONSTITUTIONAL:  LETHARGIC [   ], FEVER [   ], UNRESPONSIVE [   ]  CVS:  CP  [   ], SOB, [   ], PALPITATIONS [   ], DIZZYNESS [   ]  RS: COUGH [   ], SPUTUM [   ]  GI: ABDOMINAL PAIN [   ], NAUSEA [   ], VOMITINGS [   ], DIARRHEA [   ], CONSTIPATION [   ]  :  DYSURIA [   ], NOCTURIA [   ], INCREASED FREQUENCY [   ], DRIBLING [   ],  SKELETAL: PAINFUL JOINTS [   ], SWOLLEN JOINTS [   ], NECK ACHE [   ], LOW BACK ACHE [   ],  SKIN : ULCERS [   ], RASH [   ], ITCHING [   ]  CNS: HEAD ACHE [   ], DOUBLE VISION [   ], BLURRED VISION [   ], AMS / CONFUSION [   ], SEIZURES [   ], WEAKNESS [   ],TINGLING / NUMBNESS [   ]    PHYSICAL EXAMINATION:  GENERAL APPEARANCE: NO DISTRESS  HEENT:  NO PALLOR, NO  JVD,  NO   NODES, NECK SUPPLE  CVS: S1 +, S2 +,   RS: AEEB,  OCCASIONAL  RALES +,   NO RONCHI  ABD: SOFT, NT, NO, BS +  EXT: NO PE  SKIN: WARM,   SKELETAL:  ROM ACCEPTABLE  CNS:  AAO X 3    RADIOLOGY :    RADIOLOGY AND READINGS REVIEWED    ASSESSMENT :     Hyperkalemia    HTN (hypertension)    HLD (hyperlipidemia)    DM (diabetes mellitus)    IDDM (insulin dependent diabetes mellitus)    CKD (chronic kidney disease)    No significant past surgical history        PLAN:  HPI:  73F from home, ambulates independently, PMHx HTN, HLD, IDDM, CKD stage 3b (follows nephrologist Dr. Gage Mayorga [phone] ), p/w left flank pain since last night. Describes pain as constant and dull associated with nausea a/w chills. Denies fevers, chills, vomiting, dysuria, hematuria. Denies urinary symptoms prior to yesterday. Denies any history of abdominal surgeries. No reported h/o kidney stones or urinary retention, does not follow urologist.          (29 Jun 2023 14:48)    # SUSPECT PYELONEPHRITIS  # MILD LEFT HYDROURETERONEPHROSIS, OBSTRUCTING LEFT NEPHROLITHIASIS S/P LEFT STENT PLACEMENT 6/29    - PLACE ON MEROPENEM, F/U BCX AND UCX  - NOTED CT A/P  - S/P LEFT STENT PLACEMENT 6/29   - FLOMAX  - PRN PAIN CONTROL  - UROLOGY CONSULT  - ID CONSULT    # HYPERTENSION  - HELD LASIX AND LOSARTAN GIVEN ERIKA  - STARTED HYDRALAZINE    # HYPERKALEMIA - IMPROVED  - S/P LOKELMA, S/P KAYEXYLATE  - S/P TELEMETRY  - NEPHROLOGY CONSULT    # MEDICAL CLARANCE FOR SURGERY  - RCRI - 2 - 10% 30 DAY RISK OF DEATH, MI OR CARDIAC ARREST    # ERIKA ON CKD3  - NOTED UA  - PLACE ON IVF  - MONITOR CR  - AVOID NEPHROTOXIC    # NORMOCYTIC ANEMIA  - TREND HGB    # HTN  # HLD  # IDDM  # GI AND DVT PPX

## 2023-07-01 NOTE — PROGRESS NOTE ADULT - ASSESSMENT
Patient is a 74yo Female with CKD, DM, HTN, HLD  presents with left flank pain x 2 days. Pt a/w ERIKA on CKD, Hyperkalemia and mild left hydro  2/2 obstructed stone. Nephrology consulted for Elevated serum creatinine.    1. ERIKA- Likely hemodynamically with Lasix/ ?Losartan use. UA with 100 protein and mod blood; microscopic hematuria likely due to stones. Spot Upr/Cr 3.28, FeUrea 28.5%; consistent with pre-renal ERIKA. Renal function improving s/p IVF. Recc to hold further IVF; encourage PO fluid intake.    Avoid LR IV due to hyperkalemia. CT abd/pelvis with mild Left hydro secondary to a 5 mm proximal to mid left ureteral calculus. Additional nonobstructive left nephrolithiasis.   Unilateral hydro does not cause ERIKA. Strict I/Os. Avoid nephrotoxins/ NSAIDs/ RCA. Monitor BMP.  2. CKD-3b- Pt with presumed diabetic/ hypertensive nephropathy for which pt follows with Dr. Gage Mayorga. Spoke with his office [phone] last SCr 1.8 on 4/12/23. Spot UPr/Cr 3.28; pt will benefit with resuming ARB once renal function is back to baseline. Avoid nephrotoxins  3. Hypertensive urgency- BP improved but remains elevated. Recc to increase Hydralazine to 50mg PO tid. Will resume ARB once renal function improves to baseline and remains stable. c/w low salt diet. Monitor BP  4. Hyperkalemia- resolved s/p Lokelma. Please avoid LR IVF. c/w low potassium diet. Monitor serum potassium  5. Left hydro 2/2 obstructed stone; mild hydro, s/p L stent placement with Dr. Emanuel on 6/29. Now on flomax. Urology following.         [facility] NEPHROLOG[facility]  Zhou Mijares M.D.  Mando Hein D.O.  Claudia Krause M.D.  Rand George, MSN, ANP-C  [phone]  Fax: [phone] [facility], #[facility]  [place] [address]

## 2023-07-01 NOTE — PROGRESS NOTE ADULT - SUBJECTIVE AND OBJECTIVE BOX
INTERVAL HPI/OVERNIGHT EVENTS:    Pt seen and examined at bedside. Admits to left sided back pain along with dysuria and hematuria, denies fever, chills, SOB or CP.     Vital Signs Last 24 Hrs  T(C): 36.9 (01 Jul 2023 10:52), Max: 37.6 (30 Jun 2023 20:05)  T(F): 98.4 (01 Jul 2023 10:52), Max: 99.7 (30 Jun 2023 20:05)  HR: 77 (01 Jul 2023 10:52) (69 - 78)  BP: 169/74 (01 Jul 2023 10:52) (140/70 - 178/75)  BP(mean): 93 (01 Jul 2023 04:30) (90 - 103)  RR: 17 (01 Jul 2023 10:52) (16 - 18)  SpO2: 97% (01 Jul 2023 10:52) (93% - 97%)    Parameters below as of 01 Jul 2023 10:52  Patient On (Oxygen Delivery Method): room air      I&O's Detail    30 Jun 2023 07:01  -  01 Jul 2023 07:00  --------------------------------------------------------  IN:  Total IN: 0 mL    OUT:    Voided (mL): 2 mL  Total OUT: 2 mL    Total NET: -2 mL        meropenem  IVPB 500 milliGRAM(s) IV Intermittent every 12 hours  tamsulosin 0.4 milliGRAM(s) Oral at bedtime      Physical Exam  General: AAOx3, No acute distress  Skin: No jaundice, no icterus  Abdomen: soft,  nondistended, nontender, no rebound tenderness, no guarding, no palpable masses  Back: No CVA TTP b/l   : Normal external genitalia  Extremities: non edematous, no calf pain bilaterally        Labs:                        9.3    14.16 )-----------( 199      ( 01 Jul 2023 06:31 )             27.7     07-01    140  |  111<H>  |  46<H>  ----------------------------<  178<H>  4.3   |  20<L>  |  1.94<H>    Ca    8.4      01 Jul 2023 06:31  Phos  3.1     06-30  Mg     2.1     06-30    TPro  7.1  /  Alb  3.2<L>  /  TBili  0.5  /  DBili  x   /  AST  20  /  ALT  31  /  AlkPhos  72  07-01    PT/INR - ( 29 Jun 2023 15:20 )   PT: 13.2 sec;   INR: 1.11 ratio         PTT - ( 29 Jun 2023 15:20 )  PTT:25.1 sec

## 2023-07-02 ENCOUNTER — TRANSCRIPTION ENCOUNTER (OUTPATIENT)
Age: 73
End: 2023-07-02

## 2023-07-02 VITALS
TEMPERATURE: 98 F | SYSTOLIC BLOOD PRESSURE: 152 MMHG | OXYGEN SATURATION: 100 % | DIASTOLIC BLOOD PRESSURE: 65 MMHG | RESPIRATION RATE: 19 BRPM | HEART RATE: 86 BPM

## 2023-07-02 LAB
ANION GAP SERPL CALC-SCNC: 9 MMOL/L — SIGNIFICANT CHANGE UP (ref 5–17)
BUN SERPL-MCNC: 47 MG/DL — HIGH (ref 7–18)
CALCIUM SERPL-MCNC: 8.7 MG/DL — SIGNIFICANT CHANGE UP (ref 8.4–10.5)
CHLORIDE SERPL-SCNC: 108 MMOL/L — SIGNIFICANT CHANGE UP (ref 96–108)
CO2 SERPL-SCNC: 21 MMOL/L — LOW (ref 22–31)
CREAT SERPL-MCNC: 1.92 MG/DL — HIGH (ref 0.5–1.3)
EGFR: 27 ML/MIN/1.73M2 — LOW
GLUCOSE BLDC GLUCOMTR-MCNC: 213 MG/DL — HIGH (ref 70–99)
GLUCOSE BLDC GLUCOMTR-MCNC: 224 MG/DL — HIGH (ref 70–99)
GLUCOSE BLDC GLUCOMTR-MCNC: 241 MG/DL — HIGH (ref 70–99)
GLUCOSE SERPL-MCNC: 231 MG/DL — HIGH (ref 70–99)
HCT VFR BLD CALC: 29.2 % — LOW (ref 34.5–45)
HGB BLD-MCNC: 10 G/DL — LOW (ref 11.5–15.5)
MCHC RBC-ENTMCNC: 33.6 PG — SIGNIFICANT CHANGE UP (ref 27–34)
MCHC RBC-ENTMCNC: 34.2 GM/DL — SIGNIFICANT CHANGE UP (ref 32–36)
MCV RBC AUTO: 98 FL — SIGNIFICANT CHANGE UP (ref 80–100)
NRBC # BLD: 0 /100 WBCS — SIGNIFICANT CHANGE UP (ref 0–0)
PLATELET # BLD AUTO: 231 K/UL — SIGNIFICANT CHANGE UP (ref 150–400)
POTASSIUM SERPL-MCNC: 4.4 MMOL/L — SIGNIFICANT CHANGE UP (ref 3.5–5.3)
POTASSIUM SERPL-SCNC: 4.4 MMOL/L — SIGNIFICANT CHANGE UP (ref 3.5–5.3)
RBC # BLD: 2.98 M/UL — LOW (ref 3.8–5.2)
RBC # FLD: 12.6 % — SIGNIFICANT CHANGE UP (ref 10.3–14.5)
SODIUM SERPL-SCNC: 138 MMOL/L — SIGNIFICANT CHANGE UP (ref 135–145)
WBC # BLD: 11.51 K/UL — HIGH (ref 3.8–10.5)
WBC # FLD AUTO: 11.51 K/UL — HIGH (ref 3.8–10.5)

## 2023-07-02 PROCEDURE — 82962 GLUCOSE BLOOD TEST: CPT

## 2023-07-02 PROCEDURE — 83690 ASSAY OF LIPASE: CPT

## 2023-07-02 PROCEDURE — 36415 COLL VENOUS BLD VENIPUNCTURE: CPT

## 2023-07-02 PROCEDURE — 99232 SBSQ HOSP IP/OBS MODERATE 35: CPT

## 2023-07-02 PROCEDURE — 81001 URINALYSIS AUTO W/SCOPE: CPT

## 2023-07-02 PROCEDURE — 85027 COMPLETE CBC AUTOMATED: CPT

## 2023-07-02 PROCEDURE — 96374 THER/PROPH/DIAG INJ IV PUSH: CPT

## 2023-07-02 PROCEDURE — 76000 FLUOROSCOPY <1 HR PHYS/QHP: CPT

## 2023-07-02 PROCEDURE — 82570 ASSAY OF URINE CREATININE: CPT

## 2023-07-02 PROCEDURE — 99285 EMERGENCY DEPT VISIT HI MDM: CPT

## 2023-07-02 PROCEDURE — 86901 BLOOD TYPING SEROLOGIC RH(D): CPT

## 2023-07-02 PROCEDURE — 96375 TX/PRO/DX INJ NEW DRUG ADDON: CPT

## 2023-07-02 PROCEDURE — 83735 ASSAY OF MAGNESIUM: CPT

## 2023-07-02 PROCEDURE — C2617: CPT

## 2023-07-02 PROCEDURE — 85730 THROMBOPLASTIN TIME PARTIAL: CPT

## 2023-07-02 PROCEDURE — 87040 BLOOD CULTURE FOR BACTERIA: CPT

## 2023-07-02 PROCEDURE — 82436 ASSAY OF URINE CHLORIDE: CPT

## 2023-07-02 PROCEDURE — 84100 ASSAY OF PHOSPHORUS: CPT

## 2023-07-02 PROCEDURE — 86900 BLOOD TYPING SEROLOGIC ABO: CPT

## 2023-07-02 PROCEDURE — 85610 PROTHROMBIN TIME: CPT

## 2023-07-02 PROCEDURE — 84540 ASSAY OF URINE/UREA-N: CPT

## 2023-07-02 PROCEDURE — 86803 HEPATITIS C AB TEST: CPT

## 2023-07-02 PROCEDURE — 85025 COMPLETE CBC W/AUTO DIFF WBC: CPT

## 2023-07-02 PROCEDURE — 80048 BASIC METABOLIC PNL TOTAL CA: CPT

## 2023-07-02 PROCEDURE — 86850 RBC ANTIBODY SCREEN: CPT

## 2023-07-02 PROCEDURE — 84156 ASSAY OF PROTEIN URINE: CPT

## 2023-07-02 PROCEDURE — 74176 CT ABD & PELVIS W/O CONTRAST: CPT | Mod: MA

## 2023-07-02 PROCEDURE — 87086 URINE CULTURE/COLONY COUNT: CPT

## 2023-07-02 PROCEDURE — 84300 ASSAY OF URINE SODIUM: CPT

## 2023-07-02 PROCEDURE — 83935 ASSAY OF URINE OSMOLALITY: CPT

## 2023-07-02 PROCEDURE — 83036 HEMOGLOBIN GLYCOSYLATED A1C: CPT

## 2023-07-02 PROCEDURE — 93005 ELECTROCARDIOGRAM TRACING: CPT

## 2023-07-02 PROCEDURE — 80053 COMPREHEN METABOLIC PANEL: CPT

## 2023-07-02 RX ORDER — TAMSULOSIN HYDROCHLORIDE 0.4 MG/1
1 CAPSULE ORAL
Qty: 30 | Refills: 0
Start: 2023-07-02 | End: 2023-07-31

## 2023-07-02 RX ORDER — CIPROFLOXACIN LACTATE 400MG/40ML
1 VIAL (ML) INTRAVENOUS
Qty: 8 | Refills: 0
Start: 2023-07-02 | End: 2023-07-05

## 2023-07-02 RX ADMIN — Medication 2: at 17:19

## 2023-07-02 RX ADMIN — MEROPENEM 100 MILLIGRAM(S): 1 INJECTION INTRAVENOUS at 12:36

## 2023-07-02 RX ADMIN — Medication 2: at 12:36

## 2023-07-02 RX ADMIN — Medication 2: at 08:09

## 2023-07-02 RX ADMIN — Medication 50 MILLIGRAM(S): at 06:46

## 2023-07-02 RX ADMIN — Medication 50 MILLIGRAM(S): at 13:30

## 2023-07-02 NOTE — PROGRESS NOTE ADULT - ASSESSMENT
73y Female with 5mm L ureteral stone s/p L ureteral stent.    - Continue w/ tamsulosin (flomax) for stent-related discomfort  - Continue abx  - UCx negative  - Ureteroscopy as an outpatient  - Discussed with house staff  - Follow up with Dr. Emanuel after discharge by calling [phone] or [phone] to schedule an appointment.   [address] [facility]. [address]  [place] [address]

## 2023-07-02 NOTE — PROGRESS NOTE ADULT - PROBLEM SELECTOR PLAN 2
- WBC >13 + chills+ left flank pain  - CT confirming pyelonephritis w/ obstructing 5mm kidney stone w/ mild hydroureteronephrosis   - Urology consulted - s/p LEFT stent  placement 6/29  -  meropenem   - f/u BCx and UCx, testing   - ID consulted: Dr. Rodríguez  - Nephrology Dr. Rowell following
- WBC >13 + chills+ left flank pain  - CT confirming pyelonephritis w/ obstructing 5mm kidney stone w/ mild hydroureteronephrosis   - Urology consulted - s/p LEFT stent  placement 6/29  -  meropenem   - f/u BCx and UCx, testing   - ID consulted: Dr. Rodríguez  - Nephrology Dr. Rowell following

## 2023-07-02 NOTE — DISCHARGE NOTE PROVIDER - NSDCCAREPROVSEEN_GEN_ALL_CORE_FT
Lissy, Zhou Lozano, Jose Emanuel, Nick Narvaez, Moreno Rodríguez, Steffany Mitchell, Cuauhtemoc Ramírez, Suki Odom, Juancarlos Manuel, Kyra Maldonado, Vesta Smith, Cherelle Krause, Claudia

## 2023-07-02 NOTE — DISCHARGE NOTE PROVIDER - HOSPITAL COURSE
73F from home, ambulates independently, PMHx HTN, HLD, IDDM, CKD stage 3b (follows nephrologist Dr. Gage Mayorga [phone] ), p/w left flank pain.       CT found to have 5mm obstructive stone with LEFT mild hydroureteronephrosis, also found to have hyperkalemia. Urology following. Admitted for Pyelonephritis 2/2 obstructive kidney stone s/p stent placement 6/29. Hyperkalemia resolved, telemetry d/c'd 73F from home, ambulates independently, PMHx HTN, HLD, IDDM, CKD stage 3b (follows nephrologist Dr. Gage Mayorga [phone], presented with left flank pain.  Patient was hypertensive in the ED to 219/77.      CT found to have 5mm obstructive stone with LEFT mild hydroureteronephrosis, also found to have hyperkalemia. Urology following. Admitted for Pyelonephritis 2/2 obstructive kidney stone s/p stent placement 6/29. Hyperkalemia resolved, telemetry d/c'd 73F from home, ambulates independently, PMHx HTN, HLD, IDDM, CKD stage 3b (follows nephrologist Dr. Gage Mayorga [phone], presented with left flank pain.  Patient was hypertensive in the ED to 219/77.  Patient admitted to medicine for pyelnoephritis.  Patient found to have elevated wbc to 13K, and left flank pain.  Patient with CT showing pyelonephritis w/ obstucing 5mm kidney stone.  Patient started on meropenum per ID Dr. Gonzalez.  Patient was evaluted by urology and nephrology.  Patient had stent placed by urology.  Tamsulosin started for stent related discomfort.  Patient had mao on CKD, nephrotoxic meds held. Electrolytes repealted.    Patient is able to ambulate and tolerate diet prior to discharge. Patient is stable for discharge per attending and is advised to follow up with PCP as outpatient. Please refer to patient's complete medical chart with documents for a full hospital course, for this is only a brief summary.

## 2023-07-02 NOTE — PROGRESS NOTE ADULT - SUBJECTIVE AND OBJECTIVE BOX
Health Maintenance Due   Topic Date Due   • Hepatitis B Vaccine (1 of 3 - 3-dose series) Never done   • COVID-19 Vaccine (1) Never done   • Pneumococcal Vaccine 0-64 (1 - PCV) Never done   • Diabetes Eye Exam  Never done   • DTaP/Tdap/Td Vaccine (1 - Tdap) Never done   • Shingles Vaccine (1 of 2) Never done   • Lung Cancer Screening  Never done   • Diabetes Foot Exam  09/19/2017   • DM/CKD Microalbumin  04/03/2019   • Colorectal Cancer Risk - Colonoscopy  02/25/2021   • Traditional Medicare- Medicare Wellness Visit  Never done   • Influenza Vaccine (1) 09/01/2022       Patient is due for topics as listed above but is not proceeding with  at this time.      Patient seen/examined. No events overnight. Resting comfortably.    Vital Signs Last 24 Hrs  T(C): 36.7 (02 Jul 2023 11:12), Max: 37.7 (01 Jul 2023 16:56)  T(F): 98.1 (02 Jul 2023 11:12), Max: 99.9 (01 Jul 2023 16:56)  HR: 79 (02 Jul 2023 11:12) (72 - 96)  BP: 128/76 (02 Jul 2023 11:12) (128/76 - 181/98)  BP(mean): --  RR: 18 (02 Jul 2023 11:12) (17 - 18)  SpO2: 99% (02 Jul 2023 11:12) (94% - 99%)    Parameters below as of 02 Jul 2023 11:12  Patient On (Oxygen Delivery Method): room air        Gen: NAD. AA0x3  Abd: Soft. NT/ND                          10.0   11.51 )-----------( 231      ( 02 Jul 2023 12:08 )             29.2       07-02    138  |  108  |  47<H>  ----------------------------<  231<H>  4.4   |  21<L>  |  1.92<H>    Ca    8.7      02 Jul 2023 12:08    TPro  7.1  /  Alb  3.2<L>  /  TBili  0.5  /  DBili  x   /  AST  20  /  ALT  31  /  AlkPhos  72  07-01

## 2023-07-02 NOTE — DISCHARGE NOTE NURSING/CASE MANAGEMENT/SOCIAL WORK - PATIENT PORTAL LINK FT
You can access the FollowMyHealth Patient Portal offered by [facility] by registering at the following website: [email or web address]. By joining [employer]’s FollowMyHealth portal, you will also be able to view your health information using other applications (apps) compatible with our system.

## 2023-07-02 NOTE — PROGRESS NOTE ADULT - ASSESSMENT
Patient is a 74yo Female with CKD, DM, HTN, HLD  presents with left flank pain x 2 days. Pt a/w ERIKA on CKD, Hyperkalemia and mild left hydro  2/2 obstructed stone. Nephrology consulted for Elevated serum creatinine.    1. ERIKA- Likely hemodynamically with Lasix/ ?Losartan use. UA with 100 protein and mod blood; microscopic hematuria likely due to stones. Spot Upr/Cr 3.28, FeUrea 28.5%; consistent with pre-renal ERIKA. Renal function improving s/p IVF. Continue to hold further IVF; CT abd/pelvis with mild Left hydro secondary to a 5 mm proximal to mid left ureteral calculus. Additional nonobstructive left nephrolithiasis.   Unilateral hydro does not cause ERIKA. Strict I/Os. Avoid nephrotoxins/ NSAIDs/ RCA. Monitor BMP.  2. CKD-3b- Pt with presumed diabetic/ hypertensive nephropathy for which pt follows with Dr. Gage Mayorga. Spoke with his office [phone] last SCr 1.8 on 4/12/23. Spot UPr/Cr 3.28; pt will benefit with resuming ARB on d/c for antiproteinuric effects.  Avoid nephrotoxins  3. Hypertensive urgency- BP improved on Hydralazine 50mg PO tid, titrate as needed. Will resume ARB on discharge. c/w low salt diet. Monitor BP  4. Hyperkalemia- resolved s/p Lokelma. Please avoid LR IVF. c/w low potassium diet. Monitor serum potassium  5. Left hydro 2/2 obstructed stone; mild hydro, s/p L stent placement with Dr. Emanuel on 6/29. Now on flomax. Urology following.         [facility] NEPHR[facility]  Zhou Mijares M.D.  Mando Hein D.O.  Claudia Krause M.D.  Rand George, MSN, ANP-C  [phone]  Fax: [phone] [place] [address], [address]  [place] [address]

## 2023-07-02 NOTE — PROGRESS NOTE ADULT - ASSESSMENT
73F from home, ambulates independently, PMHx HTN, HLD, IDDM, CKD stage 3b (follows nephrologist Dr. Gage Mayorga [phone] ), p/w left flank pain. CT found to have 5mm obstructive stone with LEFT mild hydroureteronephrosis, also found to have hyperkalemia. Urology following. Admitted for Pyelonephritis 2/2 obstructive kidney stone s/p stent placement 6/29. Hyperkalemia resolved, telemetry d/c'd

## 2023-07-02 NOTE — PROGRESS NOTE ADULT - PROBLEM SELECTOR PLAN 4
- p/w SCr 2.6 on admission  - likely in the setting of obstructing kidney stone c/b resistant HTN   - follows Nephrologist, Dr. Gage Mayorga [phone]   - baseline SCr - 1.8, needs verification   - avoid NSAIDs and nephrotoxic agents   - monitor CMP daily  - Nephrology Dr. Rowell following
- p/w SCr 2.6 on admission  - likely in the setting of obstructing kidney stone c/b resistant HTN   - follows Nephrologist, Dr. Gage Mayorga [phone]   - baseline SCr - 1.8, needs verification   - avoid NSAIDs and nephrotoxic agents   - monitor CMP daily  - Nephrology Dr. Rowell following

## 2023-07-02 NOTE — DISCHARGE NOTE PROVIDER - NSDCMRMEDTOKEN_GEN_ALL_CORE_FT
aspirin 81 mg oral tablet, chewable: 1 tab(s) orally once a day  atorvastatin 20 mg oral tablet: 1 tab(s) orally once a day  CLONIDINE HYDROCHLORIDE  0.1 MG TABS:   insulin glargine 100 units/mL subcutaneous solution: 30 unit(s) subcutaneous once a day (at bedtime)  Lasix 40 mg oral tablet: 1 tab(s) orally once a day  LOSARTAN POTASSIUM  50 MG TABS:   metoprolol succinate 100 mg oral tablet, extended release: 1 tab(s) orally once a day  METOPROLOL SUCCINATE ER  50 MG TB24:   terazosin 5 mg oral capsule: 1 cap(s) orally once a day  TERAZOSIN HYDROCHLORIDE  10 MG CAPS:    aspirin 81 mg oral tablet, chewable: 1 tab(s) orally once a day  atorvastatin 20 mg oral tablet: 1 tab(s) orally once a day  Cipro 250 mg oral tablet: 1 tab(s) orally 2 times a day  insulin glargine 100 units/mL subcutaneous solution: 30 unit(s) subcutaneous once a day (at bedtime)  Lasix 40 mg oral tablet: 1 tab(s) orally once a day  LOSARTAN POTASSIUM  50 MG TABS:   metoprolol succinate 100 mg oral tablet, extended release: 1 tab(s) orally once a day  METOPROLOL SUCCINATE ER  50 MG TB24:   tamsulosin 0.4 mg oral capsule: 1 cap(s) orally once a day (at bedtime)  terazosin 5 mg oral capsule: 1 cap(s) orally once a day  TERAZOSIN HYDROCHLORIDE  10 MG CAPS:

## 2023-07-02 NOTE — PROGRESS NOTE ADULT - PROBLEM SELECTOR PLAN 8
- patient from home  - urine cultures pending  - f/u final ID plan
- patient from home  - urine cultures pending  - f/u final ID plan

## 2023-07-02 NOTE — DISCHARGE NOTE PROVIDER - NSDCCPCAREPLAN_GEN_ALL_CORE_FT
PRINCIPAL DISCHARGE DIAGNOSIS  Diagnosis: Pyelonephritis  Assessment and Plan of Treatment: You were found to have an infection in your kindeys known as pyelonephritis.  This is likley due to your kidney stone.  You were evaluted by Nephrology, Urology, and ID and their recs were followed.  You were treated with IV Meropenum which is an antibiotic.  You had a stent placed by urology to improve urine flow from your kidney.  You are being discharged on Ciprofloxacin 250mg BID for 4 more days to complete your course.  Your blood and urine cultures were negative.  Please follow up with your PCP and Urologist within 1 week.      SECONDARY DISCHARGE DIAGNOSES  Diagnosis: Kidney stones  Assessment and Plan of Treatment: You were found to have a 5mm kidney stone that was obstrucing your urine flow leading to backup of your urine into your kidneys.stone.  You were evaluted by Nephrology, Urology, and ID and their recs were followed. You had hydronephrosis from this which is when fluid is built up in the kidney.  This was likely the cause of your infection.  You had a stent placed by urology.  You were started on flomax.    Please follow up with your PCP and Urologist within 1 week.    Diagnosis: Acute kidney injury superimposed on chronic kidney disease  Assessment and Plan of Treatment: You were diagnosed with acute kidney injury superimposed on known chronic kidney disease. Your creatinine was found to be elevated . You were treated with IV fluids till it normalized. You are recommended to follow up with your PCP and Nephrologist in a week from discharge.    Diagnosis: DM (diabetes mellitus)  Assessment and Plan of Treatment: You have a history of diabetes.   You need to continue monitoring your blood sugar levels closely and maintain healthy lifestyle by eating healthy diabetic regimen, weight loss and exercise regularly as tolerated.  Please continue to take your medications as prescribed.   Please follow up with your PCP/Endocrinologist within a week of discharge.    Diagnosis: HTN (hypertension)  Assessment and Plan of Treatment: You have a history of Hypertension.   Your blood pressure target is 120-140/80-90, maintain healthy lifestyle, low salt diet, avoid fatty food, weight loss, exercise regularly or stay active as tolerated 30 mins X 3 times per week.  Notify your doctor if you have any of the following symptoms:   (Dizziness, Lightheadedness, Blurry vision, Headache, Chest pain, Shortness of breath.)  Please continue taking your home medications and follow-up with your PCP in 1 week from discharge to adjust medications as needed.

## 2023-07-02 NOTE — PROGRESS NOTE ADULT - SUBJECTIVE AND OBJECTIVE BOX
[facility] NEPHROLOGY- PROGRESS NOTE    Patient is a 72yo Female with CKD, DM, HTN, HLD  presents with left flank pain x 2 days. Pt a/w ERIKA on CKD, Hyperkalemia and mild left hydro  2/2 obstructed stone. Nephrology consulted for Elevated serum creatinine.  6/29: s/p Cystoscopy with stent placement     Hospital Medications: Medications reviewed.  REVIEW OF SYSTEMS:  CONSTITUTIONAL: No fevers or chills  RESPIRATORY: No shortness of breath  CARDIOVASCULAR: No chest pain.  GASTROINTESTINAL: No nausea, vomiting, diarrhea or abdominal pain.   : +left flank pain resolved; pt c/o pressure towards the end of mictrition with urgency; improving  VASCULAR: No bilateral lower extremity edema.     VITALS:  T(F): 98.1 (07-02-23 @ 11:12), Max: 99.9 (07-01-23 @ 16:56)  HR: 79 (07-02-23 @ 11:12)  BP: 128/76 (07-02-23 @ 11:12)  RR: 18 (07-02-23 @ 11:12)  SpO2: 99% (07-02-23 @ 11:12)  Wt(kg): --        PHYSICAL EXAM:  Constitutional: NAD  Neurological: Awake and Alert  HEENT: anicteric sclera,   Respiratory: CTAB, no wheezes, rales or rhonchi  Cardiovascular: S1, S2, RRR  Gastrointestinal: BS+, soft, NT/ND  : No das, No CVA tenderness   Extremities: mil LE edema b/l      LABS:  07-02    138  |  108  |  47<H>  ----------------------------<  231<H>  4.4   |  21<L>  |  1.92<H>    Ca    8.7      02 Jul 2023 12:08    TPro  7.1  /  Alb  3.2<L>  /  TBili  0.5  /  DBili      /  AST  20  /  ALT  31  /  AlkPhos  72  07-01    Creatinine Trend: 1.92 <--, 1.94 <--, 2.55 <--, 2.93 <--, 2.64 <--                        10.0   11.51 )-----------( 231      ( 02 Jul 2023 12:08 )             29.2     Urine Studies:  Urinalysis Basic - ( 02 Jul 2023 12:08 )    Color:  / Appearance:  / SG:  / pH:   Gluc: 231 mg/dL / Ketone:   / Bili:  / Urobili:    Blood:  / Protein:  / Nitrite:    Leuk Esterase:  / RBC:  / WBC    Sq Epi:  / Non Sq Epi:  / Bacteria:       Osmolality, Random Urine: 359 mos/kg (06-30 @ 05:24)  Sodium, Random Urine: 35 mmol/L (06-30 @ 05:24)  Chloride, Random Urine: 24 mmol/L (06-30 @ 05:24)  Creatinine, Random Urine: 87 mg/dL (06-30 @ 05:24)

## 2023-07-02 NOTE — PROGRESS NOTE ADULT - SUBJECTIVE AND OBJECTIVE BOX
PGY-1 Progress Note discussed with attending      PLEASE CONTACT ON CALL TEAM:  - On Call Team (Please refer to Lilliana) FROM 5:00 PM - 8:30PM  - Nightfloat Team FROM 8:30 -7:30 AM    INTERVAL HPI/OVERNIGHT EVENTS:       REVIEW OF SYSTEMS:  CONSTITUTIONAL: No fever, weight loss, or fatigue  RESPIRATORY: No cough, wheezing, chills or hemoptysis; No shortness of breath  CARDIOVASCULAR: No chest pain, palpitations, dizziness, or leg swelling  GASTROINTESTINAL: No abdominal pain. No nausea, vomiting, or hematemesis; No diarrhea or constipation. No melena or hematochezia.  GENITOURINARY: No dysuria or hematuria, urinary frequency  NEUROLOGICAL: No headaches, memory loss, loss of strength, numbness, or tremors  SKIN: No itching, burning, rashes, or lesions     MEDICATIONS  (STANDING):  hydrALAZINE 50 milliGRAM(s) Oral three times a day  insulin lispro (ADMELOG) corrective regimen sliding scale   SubCutaneous three times a day before meals  meropenem  IVPB 500 milliGRAM(s) IV Intermittent every 12 hours  tamsulosin 0.4 milliGRAM(s) Oral at bedtime    MEDICATIONS  (PRN):  ondansetron Injectable 4 milliGRAM(s) IV Push every 8 hours PRN Nausea and/or Vomiting      Vital Signs Last 24 Hrs  T(C): 37.1 (02 Jul 2023 07:11), Max: 37.7 (01 Jul 2023 16:56)  T(F): 98.8 (02 Jul 2023 07:11), Max: 99.9 (01 Jul 2023 16:56)  HR: 96 (02 Jul 2023 07:11) (72 - 96)  BP: 136/65 (02 Jul 2023 07:11) (136/65 - 181/98)  BP(mean): --  RR: 18 (02 Jul 2023 07:11) (17 - 18)  SpO2: 98% (02 Jul 2023 07:11) (94% - 99%)    Parameters below as of 02 Jul 2023 07:11  Patient On (Oxygen Delivery Method): room air        PHYSICAL EXAMINATION:  GENERAL: NAD  HEAD:  Atraumatic, Normocephalic  EYES:  conjunctiva and sclera clear  NECK: Supple, No JVD, Normal thyroid  CHEST/LUNG: Clear to auscultation. Clear to percussion bilaterally; No rales, rhonchi, wheezing, or rubs  HEART: Regular rate and rhythm; No murmurs, rubs, or gallops  ABDOMEN: Soft, Nontender, Nondistended; Bowel sounds present, no pain or masses on palpation  NERVOUS SYSTEM:  Alert & Oriented X3  EXTREMITIES:  2+ Peripheral Pulses, No clubbing, cyanosis, or edema  SKIN: warm dry                          9.3    14.16 )-----------( 199      ( 01 Jul 2023 06:31 )             27.7     07-01    140  |  111<H>  |  46<H>  ----------------------------<  178<H>  4.3   |  20<L>  |  1.94<H>    Ca    8.4      01 Jul 2023 06:31    TPro  7.1  /  Alb  3.2<L>  /  TBili  0.5  /  DBili  x   /  AST  20  /  ALT  31  /  AlkPhos  72  07-01    LIVER FUNCTIONS - ( 01 Jul 2023 06:31 )  Alb: 3.2 g/dL / Pro: 7.1 g/dL / ALK PHOS: 72 U/L / ALT: 31 U/L DA / AST: 20 U/L / GGT: x                   I&O's Summary        Culture - Urine (collected 29 Jun 2023 17:45)  Source: OR Collect Bladder (from O.R.)  Final Report (01 Jul 2023 16:58):    No growth    Culture - Blood (collected 29 Jun 2023 17:00)  Source: .Blood Blood-Peripheral  Preliminary Report (01 Jul 2023 23:02):    No growth at 48 Hours    Culture - Blood (collected 29 Jun 2023 16:20)  Source: .Blood Blood-Peripheral  Preliminary Report (01 Jul 2023 23:02):    No growth at 48 Hours    Culture - Urine (collected 29 Jun 2023 14:50)  Source: Clean Catch Clean Catch (Midstream)  Final Report (01 Jul 2023 13:33):    <10,000 CFU/mL Normal Urogenital Lola        CAPILLARY BLOOD GLUCOSE      RADIOLOGY & ADDITIONAL TESTS:

## 2023-07-02 NOTE — PROGRESS NOTE ADULT - PROBLEM SELECTOR PLAN 1
---resolved----  - p/w K 5.8, given hyperkalemia cocktail  - EKG: NSR w/ LVH  - monitor BMP  - tele discontinued
---resolved----  - p/w K 5.8, given hyperkalemia cocktail  - EKG: NSR w/ LVH  - monitor BMP  - tele discontinued

## 2023-07-02 NOTE — PROGRESS NOTE ADULT - PROBLEM SELECTOR PLAN 6
- noted to be on  long-acting Insulin 50U qhs    - f/u A1c  - c/w lantus 30 + sliding scale  - Adjust insulin as indicated  - FS ACHS
- noted to be on  long-acting Insulin 50U qhs    - f/u A1c  - c/w lantus 30 + sliding scale  - Adjust insulin as indicated  - FS ACHS

## 2023-07-02 NOTE — PROGRESS NOTE ADULT - ATTENDING COMMENTS
Patient seen/examined. Agree with above and edited as appropriate.
HPI:  73F from home, ambulates independently, PMHx HTN, HLD, IDDM, CKD stage 3b (follows nephrologist Dr. Gage Mayorga [phone] ), p/w left flank pain since last night. Describes pain as constant and dull associated with nausea a/w chills. Denies fevers, chills, vomiting, dysuria, hematuria. Denies urinary symptoms prior to yesterday. Denies any history of abdominal surgeries. No reported h/o kidney stones or urinary retention, does not follow urologist.          (29 Jun 2023 14:48)    # SUSPECT PYELONEPHRITIS  # MILD LEFT HYDROURETERONEPHROSIS, OBSTRUCTING LEFT NEPHROLITHIASIS S/P LEFT STENT PLACEMENT 6/29    - PLACE ON MEROPENEM, F/U BCX AND UCX  - NOTED CT A/P  - S/P LEFT STENT PLACEMENT 6/29   - FLOMAX  - PRN PAIN CONTROL  - UROLOGY CONSULT  - ID CONSULT    # HYPERTENSION  - HELD LASIX AND LOSARTAN GIVEN ERIKA  - STARTED HYDRALAZINE    # HYPERKALEMIA - IMPROVED  - S/P LOKELMA, S/P KAYEXYLATE  - S/P TELEMETRY  - NEPHROLOGY CONSULT    # MEDICAL CLARANCE FOR SURGERY  - RCRI - 2 - 10% 30 DAY RISK OF DEATH, MI OR CARDIAC ARREST    # ERIKA ON CKD3  - NOTED UA  - PLACE ON IVF  - MONITOR CR  - AVOID NEPHROTOXIC    # NORMOCYTIC ANEMIA  - TREND HGB    # HTN  # HLD  # IDDM  # GI AND DVT PPX

## 2023-07-02 NOTE — DISCHARGE NOTE PROVIDER - CARE PROVIDER_API CALL
Nick Emanuel  Urology  [address] [place] [address]  Phone: [phone]  Fax: [phone]  Follow Up Time:     Vesta Maldonado  Internal Medicine  [address] [place] [address]  Phone: [phone]  Fax: [phone]  Follow Up Time:

## 2023-07-02 NOTE — PROGRESS NOTE ADULT - PROBLEM SELECTOR PLAN 5
- noted to be on Toprol 50mg, Clonidine 0.1mg BID?TID, Terazosin 10mg, Losartan, and Lasix  - lasix and Losartan for ERIKA HELD on admission  - BP labile  - will continue to monitor  - consider restarting home meds if BP continues to remain elevated
- noted to be on Toprol 50mg, Clonidine 0.1mg BID?TID, Terazosin 10mg, Losartan, and Lasix  - lasix and Losartan for ERIKA HELD on admission  - BP labile  - will continue to monitor  - consider restarting home meds if BP continues to remain elevated

## 2023-07-02 NOTE — PROGRESS NOTE ADULT - PROVIDER SPECIALTY LIST ADULT
Internal Medicine
Urology
Internal Medicine
Nephrology
Nephrology
Urology
Urology
Nephrology
Internal Medicine
Internal Medicine

## 2023-07-04 LAB
CULTURE RESULTS: SIGNIFICANT CHANGE UP
CULTURE RESULTS: SIGNIFICANT CHANGE UP
SPECIMEN SOURCE: SIGNIFICANT CHANGE UP
SPECIMEN SOURCE: SIGNIFICANT CHANGE UP

## 2023-07-11 ENCOUNTER — APPOINTMENT (OUTPATIENT)
Dept: UROLOGY | Facility: CLINIC | Age: 73
End: 2023-07-11
Payer: MEDICARE

## 2023-07-11 VITALS
HEIGHT: 61 IN | SYSTOLIC BLOOD PRESSURE: 107 MMHG | OXYGEN SATURATION: 99 % | TEMPERATURE: 97.1 F | DIASTOLIC BLOOD PRESSURE: 61 MMHG | WEIGHT: 153 LBS | HEART RATE: 58 BPM | BODY MASS INDEX: 28.89 KG/M2

## 2023-07-11 PROCEDURE — 99214 OFFICE O/P EST MOD 30 MIN: CPT

## 2023-07-11 NOTE — HISTORY OF PRESENT ILLNESS
[FreeTextEntry1] : Very pleasant 73-year-old woman who presents for follow-up of left ureteral and left kidney stone status post stent.  She recently presented to the hospital complaining of left flank pain and a creatinine was noted to be elevated from her baseline of approximately 1.8.  She was taken to the operating room emergently for a left ureteral stent.  She reports feeling better after stent placement.  She reports resolution of hematuria at this time.  She presents today to discuss options for management moving forward.

## 2023-07-11 NOTE — ASSESSMENT
[FreeTextEntry1] : Very pleasant 73-year-old woman who presents for follow-up of left ureteral stone, left kidney stone status post stent\par -CT images reviewed with the patient demonstrating an approximately 3 to 4 mm left proximal ureteral stone with mild hydronephrosis and nonobstructing left intrarenal stones measuring up to 7 to 8 mm\par -I discussed the different treatment modalities for nephrolithiasis with the patient, including medical management, spontaneous stone passage, percutaneous stone extraction, extracorporeal shock wave lithotripsy, and ureteroscopy with laser lithotripsy and stone extraction. Given the size and location of the stone, the patient opted to proceed with ureteroscopy.  The risks, benefits, and alternatives to ureteroscopy were discussed with the patient, including but not limited to pain, infection, bleeding, bladder injury, ureteral injury, renal injury, and treatment failure.  I also discussed the possible need for a temporary ureteral stent.  I discussed the possible side effects of a temporary ureteral stent, including flank pain, hematuria, and bladder spasms.  The patient understands that a stent is a temporary implant that must be removed in the future.  The patient wishes to proceed and we will schedule the surgery for the near future.\par -Urine culture

## 2023-07-14 LAB — BACTERIA UR CULT: NORMAL

## 2023-07-20 ENCOUNTER — OUTPATIENT (OUTPATIENT)
Dept: OUTPATIENT SERVICES | Facility: HOSPITAL | Age: 73
LOS: 1 days | End: 2023-07-20
Payer: MEDICARE

## 2023-07-20 VITALS
HEIGHT: 61 IN | HEART RATE: 5 BPM | SYSTOLIC BLOOD PRESSURE: 167 MMHG | TEMPERATURE: 99 F | RESPIRATION RATE: 16 BRPM | OXYGEN SATURATION: 99 % | WEIGHT: 153 LBS | DIASTOLIC BLOOD PRESSURE: 80 MMHG

## 2023-07-20 DIAGNOSIS — I10 ESSENTIAL (PRIMARY) HYPERTENSION: ICD-10-CM

## 2023-07-20 DIAGNOSIS — E78.5 HYPERLIPIDEMIA, UNSPECIFIED: ICD-10-CM

## 2023-07-20 DIAGNOSIS — E11.9 TYPE 2 DIABETES MELLITUS WITHOUT COMPLICATIONS: ICD-10-CM

## 2023-07-20 DIAGNOSIS — Z96.0 PRESENCE OF UROGENITAL IMPLANTS: Chronic | ICD-10-CM

## 2023-07-20 DIAGNOSIS — N20.0 CALCULUS OF KIDNEY: ICD-10-CM

## 2023-07-20 DIAGNOSIS — Z98.41 CATARACT EXTRACTION STATUS, RIGHT EYE: Chronic | ICD-10-CM

## 2023-07-20 DIAGNOSIS — N18.9 CHRONIC KIDNEY DISEASE, UNSPECIFIED: ICD-10-CM

## 2023-07-20 DIAGNOSIS — Z01.818 ENCOUNTER FOR OTHER PREPROCEDURAL EXAMINATION: ICD-10-CM

## 2023-07-20 LAB
A1C WITH ESTIMATED AVERAGE GLUCOSE RESULT: 7.7 % — HIGH (ref 4–5.6)
ALBUMIN SERPL ELPH-MCNC: 3.7 G/DL — SIGNIFICANT CHANGE UP (ref 3.5–5)
ALP SERPL-CCNC: 86 U/L — SIGNIFICANT CHANGE UP (ref 40–120)
ALT FLD-CCNC: 42 U/L DA — SIGNIFICANT CHANGE UP (ref 10–60)
ANION GAP SERPL CALC-SCNC: 6 MMOL/L — SIGNIFICANT CHANGE UP (ref 5–17)
APPEARANCE UR: CLEAR — SIGNIFICANT CHANGE UP
APTT BLD: 32.5 SEC — SIGNIFICANT CHANGE UP (ref 27.5–35.5)
AST SERPL-CCNC: 22 U/L — SIGNIFICANT CHANGE UP (ref 10–40)
BACTERIA # UR AUTO: ABNORMAL /HPF
BILIRUB SERPL-MCNC: 0.3 MG/DL — SIGNIFICANT CHANGE UP (ref 0.2–1.2)
BILIRUB UR-MCNC: NEGATIVE — SIGNIFICANT CHANGE UP
BUN SERPL-MCNC: 45 MG/DL — HIGH (ref 7–18)
CALCIUM SERPL-MCNC: 9.3 MG/DL — SIGNIFICANT CHANGE UP (ref 8.4–10.5)
CHLORIDE SERPL-SCNC: 111 MMOL/L — HIGH (ref 96–108)
CO2 SERPL-SCNC: 20 MMOL/L — LOW (ref 22–31)
COLOR SPEC: YELLOW — SIGNIFICANT CHANGE UP
CREAT SERPL-MCNC: 1.98 MG/DL — HIGH (ref 0.5–1.3)
DIFF PNL FLD: ABNORMAL
EGFR: 26 ML/MIN/1.73M2 — LOW
EPI CELLS # UR: ABNORMAL /HPF
ESTIMATED AVERAGE GLUCOSE: 174 MG/DL — HIGH (ref 68–114)
GLUCOSE SERPL-MCNC: 213 MG/DL — HIGH (ref 70–99)
GLUCOSE UR QL: NEGATIVE — SIGNIFICANT CHANGE UP
HCT VFR BLD CALC: 29.7 % — LOW (ref 34.5–45)
HGB BLD-MCNC: 10 G/DL — LOW (ref 11.5–15.5)
INR BLD: 1.05 RATIO — SIGNIFICANT CHANGE UP (ref 0.88–1.16)
KETONES UR-MCNC: ABNORMAL
LEUKOCYTE ESTERASE UR-ACNC: ABNORMAL
MCHC RBC-ENTMCNC: 33.1 PG — SIGNIFICANT CHANGE UP (ref 27–34)
MCHC RBC-ENTMCNC: 33.7 GM/DL — SIGNIFICANT CHANGE UP (ref 32–36)
MCV RBC AUTO: 98.3 FL — SIGNIFICANT CHANGE UP (ref 80–100)
NITRITE UR-MCNC: NEGATIVE — SIGNIFICANT CHANGE UP
NRBC # BLD: 0 /100 WBCS — SIGNIFICANT CHANGE UP (ref 0–0)
PH UR: 5 — SIGNIFICANT CHANGE UP (ref 5–8)
PLATELET # BLD AUTO: 276 K/UL — SIGNIFICANT CHANGE UP (ref 150–400)
POTASSIUM SERPL-MCNC: 4.9 MMOL/L — SIGNIFICANT CHANGE UP (ref 3.5–5.3)
POTASSIUM SERPL-SCNC: 4.9 MMOL/L — SIGNIFICANT CHANGE UP (ref 3.5–5.3)
PROT SERPL-MCNC: 7.7 G/DL — SIGNIFICANT CHANGE UP (ref 6–8.3)
PROT UR-MCNC: 100 MG/DL
PROTHROM AB SERPL-ACNC: 12.5 SEC — SIGNIFICANT CHANGE UP (ref 10.5–13.4)
RBC # BLD: 3.02 M/UL — LOW (ref 3.8–5.2)
RBC # FLD: 12.5 % — SIGNIFICANT CHANGE UP (ref 10.3–14.5)
RBC CASTS # UR COMP ASSIST: >50 /HPF (ref 0–2)
SODIUM SERPL-SCNC: 137 MMOL/L — SIGNIFICANT CHANGE UP (ref 135–145)
SP GR SPEC: 1.02 — SIGNIFICANT CHANGE UP (ref 1.01–1.02)
UROBILINOGEN FLD QL: NEGATIVE — SIGNIFICANT CHANGE UP
WBC # BLD: 7.67 K/UL — SIGNIFICANT CHANGE UP (ref 3.8–10.5)
WBC # FLD AUTO: 7.67 K/UL — SIGNIFICANT CHANGE UP (ref 3.8–10.5)
WBC UR QL: ABNORMAL /HPF (ref 0–5)

## 2023-07-20 PROCEDURE — 71046 X-RAY EXAM CHEST 2 VIEWS: CPT | Mod: 26

## 2023-07-20 PROCEDURE — 93010 ELECTROCARDIOGRAM REPORT: CPT

## 2023-07-20 NOTE — H&P PST ADULT - ASSESSMENT
73 yr old female history of HTN, HDL, IDDM, CKD  presents with calculus of kidney. Pt stated came to this hospital on June 29, 2023 with severe left flank pain. Pt had cystoscopy wit left ureteral stent placed due to kidney stone. Pt comes to PST scheduled for cystoscopy left ureteroscopy with laser lithotripsy stone extraction stent placement and retrograde pyelogram on 7/27/2023. All blood work chest X Ray and EKG preformed to obtain medical clearance from PCP.

## 2023-07-20 NOTE — H&P PST ADULT - NSICDXPASTMEDICALHX_GEN_ALL_CORE_FT
PAST MEDICAL HISTORY:  Calculus of kidney     CKD (chronic kidney disease)     HLD (hyperlipidemia)     HTN (hypertension)     IDDM (insulin dependent diabetes mellitus)

## 2023-07-20 NOTE — H&P PST ADULT - GENITOURINARY COMMENTS
not examined calculus of kidney left stent placed 6/27/2023 calculus of kidney has left ureter stent on 6/29/2023 CKD calculus of kidney has left ureter stent on 6/29/2023 CKD Dr Gage Mayorga nephrologist [phone]

## 2023-07-20 NOTE — H&P PST ADULT - NSANTHOSAYNRD_GEN_A_CORE
No. JOEL screening performed.  STOP BANG Legend: 0-2 = LOW Risk; 3-4 = INTERMEDIATE Risk; 5-8 = HIGH Risk

## 2023-07-20 NOTE — H&P PST ADULT - NSICDXPROCEDURE_GEN_ALL_CORE_FT
PROCEDURES:  Cystoscopy, with ureteroscopic laser lithotripsy and stent insertion 20-Jul-2023 10:05:19  Coni Ibarra

## 2023-07-20 NOTE — H&P PST ADULT - PROBLEM SELECTOR PLAN 4
scheduled for cystoscopy left ureteroscopy with laser lithotripsy stone extraction sten placement and retrograde pyelogram.    Pt instructed to be NPO the night before and the morning of surgery except for po meds with sip of water. Provided with chlorhexidene 4% solution to wash for 3 days including the morning of surgery. Written instructions given and reviewed with pt. Escort required post procedure. Tylenol to be used if needed prior to surgery.    Stop Bang Score= 2 Pt low risk of JOEL.

## 2023-07-20 NOTE — H&P PST ADULT - NSICDXFAMILYHX_GEN_ALL_CORE_FT
FAMILY HISTORY:  Father  Still living? Unknown  Family history of diabetes mellitus (DM), Age at diagnosis: Age Unknown    Sibling  Still living? No  Family history of diabetes mellitus (DM), Age at diagnosis: Age Unknown

## 2023-07-20 NOTE — H&P PST ADULT - NSICDXPASTSURGICALHX_GEN_ALL_CORE_FT
PAST SURGICAL HISTORY:  S/P cystoscopy with ureteral stent placement     S/P right cataract extraction

## 2023-07-20 NOTE — H&P PST ADULT - PROBLEM SELECTOR PLAN 5
Pt to follow endocrinologist recommendation on Soliqua using only half dose of insulin am of surgery. Pt will have blood glucose monitoring am of surgery.

## 2023-07-21 PROCEDURE — G0463: CPT

## 2023-07-21 PROCEDURE — 93005 ELECTROCARDIOGRAM TRACING: CPT

## 2023-07-21 PROCEDURE — 71046 X-RAY EXAM CHEST 2 VIEWS: CPT

## 2023-07-22 LAB
CULTURE RESULTS: SIGNIFICANT CHANGE UP
SPECIMEN SOURCE: SIGNIFICANT CHANGE UP

## 2023-07-26 ENCOUNTER — TRANSCRIPTION ENCOUNTER (OUTPATIENT)
Age: 73
End: 2023-07-26

## 2023-07-27 ENCOUNTER — OUTPATIENT (OUTPATIENT)
Dept: OUTPATIENT SERVICES | Facility: HOSPITAL | Age: 73
LOS: 1 days | End: 2023-07-27
Payer: MEDICARE

## 2023-07-27 ENCOUNTER — RESULT REVIEW (OUTPATIENT)
Age: 73
End: 2023-07-27

## 2023-07-27 ENCOUNTER — TRANSCRIPTION ENCOUNTER (OUTPATIENT)
Age: 73
End: 2023-07-27

## 2023-07-27 ENCOUNTER — APPOINTMENT (OUTPATIENT)
Dept: UROLOGY | Facility: HOSPITAL | Age: 73
End: 2023-07-27

## 2023-07-27 VITALS
DIASTOLIC BLOOD PRESSURE: 79 MMHG | HEART RATE: 58 BPM | RESPIRATION RATE: 16 BRPM | OXYGEN SATURATION: 99 % | TEMPERATURE: 98 F | SYSTOLIC BLOOD PRESSURE: 162 MMHG | HEIGHT: 61 IN | WEIGHT: 153 LBS

## 2023-07-27 VITALS
SYSTOLIC BLOOD PRESSURE: 139 MMHG | HEART RATE: 66 BPM | OXYGEN SATURATION: 95 % | TEMPERATURE: 97 F | DIASTOLIC BLOOD PRESSURE: 56 MMHG | RESPIRATION RATE: 14 BRPM

## 2023-07-27 DIAGNOSIS — Z96.0 PRESENCE OF UROGENITAL IMPLANTS: Chronic | ICD-10-CM

## 2023-07-27 DIAGNOSIS — Z98.41 CATARACT EXTRACTION STATUS, RIGHT EYE: Chronic | ICD-10-CM

## 2023-07-27 DIAGNOSIS — N20.0 CALCULUS OF KIDNEY: ICD-10-CM

## 2023-07-27 LAB
GLUCOSE BLDC GLUCOMTR-MCNC: 128 MG/DL — HIGH (ref 70–99)
GLUCOSE BLDC GLUCOMTR-MCNC: 177 MG/DL — HIGH (ref 70–99)

## 2023-07-27 PROCEDURE — C1889: CPT

## 2023-07-27 PROCEDURE — 52332 CYSTOSCOPY AND TREATMENT: CPT | Mod: LT

## 2023-07-27 PROCEDURE — 88300 SURGICAL PATH GROSS: CPT | Mod: 26

## 2023-07-27 PROCEDURE — C1769: CPT

## 2023-07-27 PROCEDURE — 76000 FLUOROSCOPY <1 HR PHYS/QHP: CPT

## 2023-07-27 PROCEDURE — 52352 CYSTOURETERO W/STONE REMOVE: CPT | Mod: LT

## 2023-07-27 PROCEDURE — 52356 CYSTO/URETERO W/LITHOTRIPSY: CPT | Mod: LT

## 2023-07-27 PROCEDURE — 82365 CALCULUS SPECTROSCOPY: CPT

## 2023-07-27 PROCEDURE — C1758: CPT

## 2023-07-27 PROCEDURE — 82962 GLUCOSE BLOOD TEST: CPT

## 2023-07-27 PROCEDURE — C1747: CPT

## 2023-07-27 PROCEDURE — 88300 SURGICAL PATH GROSS: CPT

## 2023-07-27 PROCEDURE — 74420 UROGRAPHY RTRGR +-KUB: CPT | Mod: 26

## 2023-07-27 PROCEDURE — C2617: CPT

## 2023-07-27 DEVICE — URETERAL SHEATH NAVIGATOR HD 12/14FR X 28CM: Type: IMPLANTABLE DEVICE | Status: FUNCTIONAL

## 2023-07-27 DEVICE — LASER FIBER FLEXIVA TRACTIP 242: Type: IMPLANTABLE DEVICE | Status: FUNCTIONAL

## 2023-07-27 DEVICE — STENT URETHRAL PIGTL DBL 6FRX22CM: Type: IMPLANTABLE DEVICE | Status: FUNCTIONAL

## 2023-07-27 DEVICE — GUIDEWIRE AMPLATZ SUPER-STIFF STRAIGHT .035" X 260CM: Type: IMPLANTABLE DEVICE | Status: FUNCTIONAL

## 2023-07-27 DEVICE — URETEROSCOPE LITHOVUE DISP: Type: IMPLANTABLE DEVICE | Status: FUNCTIONAL

## 2023-07-27 DEVICE — URETERAL CATH FLEXIMA OPEN END 5FR 70CM: Type: IMPLANTABLE DEVICE | Status: FUNCTIONAL

## 2023-07-27 DEVICE — URETERAL STENT PERCUFLEX PLUS 6FR 26CM: Type: IMPLANTABLE DEVICE | Status: FUNCTIONAL

## 2023-07-27 DEVICE — GUIDEWIRE SENSOR DUAL-FLEX NITINOL STRAIGHT .035" X 150CM: Type: IMPLANTABLE DEVICE | Status: FUNCTIONAL

## 2023-07-27 DEVICE — STONE BASKET ZEROTIP NITINOL 4-WIRE 1.9FR 120CM X 12MM: Type: IMPLANTABLE DEVICE | Status: FUNCTIONAL

## 2023-07-27 RX ORDER — FUROSEMIDE 40 MG
1 TABLET ORAL
Qty: 0 | Refills: 0 | DISCHARGE

## 2023-07-27 RX ORDER — HYDROMORPHONE HYDROCHLORIDE 2 MG/ML
0.5 INJECTION INTRAMUSCULAR; INTRAVENOUS; SUBCUTANEOUS
Refills: 0 | Status: DISCONTINUED | OUTPATIENT
Start: 2023-07-27 | End: 2023-07-27

## 2023-07-27 RX ORDER — HYDROMORPHONE HYDROCHLORIDE 2 MG/ML
1 INJECTION INTRAMUSCULAR; INTRAVENOUS; SUBCUTANEOUS
Refills: 0 | Status: DISCONTINUED | OUTPATIENT
Start: 2023-07-27 | End: 2023-07-27

## 2023-07-27 RX ORDER — ATORVASTATIN CALCIUM 80 MG/1
0 TABLET, FILM COATED ORAL
Qty: 0 | Refills: 0 | DISCHARGE

## 2023-07-27 RX ORDER — TERAZOSIN HYDROCHLORIDE 10 MG/1
1 CAPSULE ORAL
Qty: 0 | Refills: 0 | DISCHARGE

## 2023-07-27 RX ORDER — SODIUM CHLORIDE 9 MG/ML
1000 INJECTION, SOLUTION INTRAVENOUS
Refills: 0 | Status: DISCONTINUED | OUTPATIENT
Start: 2023-07-27 | End: 2023-08-10

## 2023-07-27 RX ORDER — ASPIRIN/CALCIUM CARB/MAGNESIUM 324 MG
1 TABLET ORAL
Qty: 0 | Refills: 0 | DISCHARGE

## 2023-07-27 RX ORDER — TERAZOSIN HYDROCHLORIDE 10 MG/1
0 CAPSULE ORAL
Qty: 0 | Refills: 0 | DISCHARGE

## 2023-07-27 RX ORDER — ONDANSETRON 8 MG/1
4 TABLET, FILM COATED ORAL ONCE
Refills: 0 | Status: DISCONTINUED | OUTPATIENT
Start: 2023-07-27 | End: 2023-07-27

## 2023-07-27 RX ORDER — SODIUM CHLORIDE 9 MG/ML
3 INJECTION INTRAMUSCULAR; INTRAVENOUS; SUBCUTANEOUS EVERY 8 HOURS
Refills: 0 | Status: DISCONTINUED | OUTPATIENT
Start: 2023-07-27 | End: 2023-07-27

## 2023-07-27 RX ORDER — INSULIN GLARGINE AND LIXISENATIDE 100; 33 U/ML; UG/ML
50 INJECTION, SOLUTION SUBCUTANEOUS
Refills: 0 | DISCHARGE

## 2023-07-27 RX ORDER — AMLODIPINE BESYLATE 2.5 MG/1
1 TABLET ORAL
Refills: 0 | DISCHARGE

## 2023-07-27 RX ORDER — METOPROLOL TARTRATE 50 MG
0 TABLET ORAL
Qty: 0 | Refills: 0 | DISCHARGE

## 2023-07-27 RX ORDER — LOSARTAN POTASSIUM 100 MG/1
0 TABLET, FILM COATED ORAL
Qty: 0 | Refills: 0 | DISCHARGE

## 2023-07-27 RX ORDER — ACETAMINOPHEN 500 MG
1000 TABLET ORAL EVERY 6 HOURS
Refills: 0 | Status: DISCONTINUED | OUTPATIENT
Start: 2023-07-27 | End: 2023-08-10

## 2023-07-27 RX ORDER — SODIUM CHLORIDE 9 MG/ML
1000 INJECTION, SOLUTION INTRAVENOUS
Refills: 0 | Status: DISCONTINUED | OUTPATIENT
Start: 2023-07-27 | End: 2023-07-27

## 2023-07-27 RX ORDER — CIPROFLOXACIN LACTATE 400MG/40ML
1 VIAL (ML) INTRAVENOUS
Qty: 6 | Refills: 0
Start: 2023-07-27 | End: 2023-07-29

## 2023-07-27 NOTE — ASU DISCHARGE PLAN (ADULT/PEDIATRIC) - ASU DC SPECIAL INSTRUCTIONSFT
Discharge Instructions: Ureteroscopy    · Stent: You have an internal stent (a hollow tube that runs from the kidney to your bladder) after your procedure, which helps urine drain from the kidney to your bladder. Some patients experience urinary frequency, burning, or even back pain (especially with urination). These sensations will gradually get better. Increasing your fluid intake can also improve these symptoms. While the stent is in place, your urine may continue to be bloody. This stent is temporary and must be removed or exchanged by your urologist as an outpatient within 3 months unless otherwise specified.    o If your stent is on a string, it is secured to your leg or genitalia with an adhesive bandage. Do not pull on the string, do not remove the bandage, do not insert anything intravaginally (such as a tampon), and do not engage in sexual intercourse until after the stent is removed at your post-operative appointment.    · General: It is common to have blood in the urine after your procedure. It may be pink or even red; inform your doctor if you have a significant amount of clot in the urine or if you are unable to void at all. The urine may clear and then become bloody again especially as you are more physically active.    · Bathing: You may shower or bathe.    · Diet: You may resume your regular diet and regular medication regimen.    · Pain: You may take Tylenol (acetaminophen) 650-975mg and/or Motrin (ibuprofen) 400-600mg, available over the counter, for pain every 6 hours as needed. Do not exceed 4000 milligrams of Tylenol (acetaminophen) daily. You may alternate these medications such that you take either one every 3 hours.    · Antibiotics: You have been given a prescription for an antibiotic, please take this medication as instructed and be sure to complete entire course.    · Stool softeners: Do not allow yourself to become constipated as this may increase your bother from the stent and/or straining may cause bleeding. Take stool softeners (ex. Colace) or a laxative (ex. Senekot, ExLax), available over the counter, if needed.    · Activity: No heavy lifting or strenuous exercise until you are evaluated at your post-operative appointment. Otherwise, you may return to your usual level of activity.    · Anticoagulation: If you are taking any blood thinning medications, please discuss with your urologist prior to restarting these medications unless otherwise specified.    · Follow-up: If you did not already schedule your post-operative appointment, please call your urologist to schedule a follow-up appointment.    · Call your urologist if: You have any bleeding that does not stop, inability to void >8 hours, fever over 100.4 F, chills, persistent nausea/vomiting, or if your pain is not controlled on your discharge pain medications.

## 2023-07-27 NOTE — ASU DISCHARGE PLAN (ADULT/PEDIATRIC) - NS MD DC FALL RISK RISK
For information on Fall & Injury Prevention, visit: [email or web address] OR  [email or web address] OR  https://www.cdc.gov/steadi/patient.html

## 2023-07-27 NOTE — ASU DISCHARGE PLAN (ADULT/PEDIATRIC) - CARE PROVIDER_API CALL
Nick Emanuel  Urology  [address], [address] [place] [address]  Phone: [phone]  Fax: [phone]  Follow Up Time: 2 weeks

## 2023-07-28 PROBLEM — N20.0 CALCULUS OF KIDNEY: Chronic | Status: ACTIVE | Noted: 2023-07-20

## 2023-08-01 ENCOUNTER — APPOINTMENT (OUTPATIENT)
Dept: UROLOGY | Facility: CLINIC | Age: 73
End: 2023-08-01
Payer: MEDICARE

## 2023-08-01 VITALS
TEMPERATURE: 97.3 F | HEIGHT: 61 IN | BODY MASS INDEX: 28.89 KG/M2 | WEIGHT: 153 LBS | OXYGEN SATURATION: 96 % | DIASTOLIC BLOOD PRESSURE: 64 MMHG | HEART RATE: 59 BPM | SYSTOLIC BLOOD PRESSURE: 116 MMHG

## 2023-08-01 DIAGNOSIS — N20.1 CALCULUS OF URETER: ICD-10-CM

## 2023-08-01 PROCEDURE — 52310 CYSTOSCOPY AND TREATMENT: CPT

## 2023-08-07 LAB
CELL MATERIAL STONE EST-MCNT: SIGNIFICANT CHANGE UP
LABORATORY COMMENT REPORT: SIGNIFICANT CHANGE UP
NIDUS STONE QN: SIGNIFICANT CHANGE UP

## 2023-08-11 ENCOUNTER — APPOINTMENT (OUTPATIENT)
Dept: UROLOGY | Facility: CLINIC | Age: 73
End: 2023-08-11

## 2023-08-14 LAB — SURGICAL PATHOLOGY STUDY: SIGNIFICANT CHANGE UP

## 2023-08-29 ENCOUNTER — APPOINTMENT (OUTPATIENT)
Dept: UROLOGY | Facility: CLINIC | Age: 73
End: 2023-08-29
Payer: MEDICARE

## 2023-08-29 VITALS
HEART RATE: 54 BPM | BODY MASS INDEX: 28.89 KG/M2 | WEIGHT: 153 LBS | OXYGEN SATURATION: 98 % | DIASTOLIC BLOOD PRESSURE: 69 MMHG | HEIGHT: 61 IN | SYSTOLIC BLOOD PRESSURE: 131 MMHG | TEMPERATURE: 97.7 F

## 2023-08-29 PROCEDURE — 99214 OFFICE O/P EST MOD 30 MIN: CPT

## 2023-08-29 NOTE — HISTORY OF PRESENT ILLNESS
[FreeTextEntry1] : 73-year-old woman who presents for follow-up of kidney stones.  She recently underwent a ureteroscopy with laser lithotripsy, stone extraction, and stent placement.  She reports that she feels well after the stent was removed.  Calculus analysis demonstrated 100% calcium oxalate monohydrate stone composition.  She presents today for results of stone analysis and work-up for stones

## 2023-08-29 NOTE — ASSESSMENT
[FreeTextEntry1] : 73-year-old woman who presents for follow-up of kidney stones -Calculus analysis demonstrates 100% calcium oxalate monohydrate stone composition -We discussed general stone prevention strategies -We discussed specific stone prevention strategies for calcium oxalate stones -LithoLink urinalysis -CMP -A1c -PTH -TSH -Uric acid

## 2023-09-26 ENCOUNTER — APPOINTMENT (OUTPATIENT)
Dept: UROLOGY | Facility: CLINIC | Age: 73
End: 2023-09-26
Payer: MEDICARE

## 2023-09-26 VITALS
SYSTOLIC BLOOD PRESSURE: 165 MMHG | WEIGHT: 153 LBS | TEMPERATURE: 97.3 F | HEART RATE: 79 BPM | BODY MASS INDEX: 30.04 KG/M2 | DIASTOLIC BLOOD PRESSURE: 74 MMHG | OXYGEN SATURATION: 99 % | HEIGHT: 60 IN

## 2023-09-26 PROCEDURE — 99214 OFFICE O/P EST MOD 30 MIN: CPT

## 2023-12-05 NOTE — ED ADULT NURSE NOTE - PATIENT DISCHARGE SIGNATURE
76-year-old female whose  is my patient presents after an episode acute diverticulitis seen in the emergency room at [facility] and confirmed with CT scan. She also has a very large hiatal hernia that is okay in terms or reflux as long she takes her medication for acid suppression. She also does not have dysphagia or any significant regurgitation or symptoms. There is no sign of any bleeding or melena. Her lower abdominal pain has disappeared for the most part. She has been evaluated by Urology because of hematuria and she is going to have an echocardiogram this afternoon and then see Dr. Garcia back next week. We will get clearance after that. Her weight is stable and she is eating okay. She has erratic bowel function with no bowel movement for week and then she will start to take clear max and then develops diarrhea and then takes Lomotil and stops at all again so she is cycling poorly.  she had a colonoscopy with polyps she thinks 5 years ago and I will get those records from her former gastroenterologist.  She is going to have a knee replacement in January potentially and she had a cardiac ablation about 5 weeks ago for atrial fibrillation and is on Eliquis. .  16-Feb-2018

## 2023-12-08 NOTE — ED PROVIDER NOTE - ABDOMINAL EXAM
Spoke with the pt and advised that the recommendation is to go to ER- for diagnostic imaging and possible treatment    Pt is hesitant- advised that she can call her ophthalmologist to see if they can address her eye, but that the recommendation from Dr. Jyothi Valdez is to go to the ER    She v/u tender...

## 2023-12-12 ENCOUNTER — APPOINTMENT (OUTPATIENT)
Dept: UROLOGY | Facility: CLINIC | Age: 73
End: 2023-12-12
Payer: MEDICARE

## 2023-12-12 VITALS — SYSTOLIC BLOOD PRESSURE: 201 MMHG | DIASTOLIC BLOOD PRESSURE: 76 MMHG

## 2023-12-12 VITALS
HEIGHT: 60 IN | TEMPERATURE: 98.1 F | SYSTOLIC BLOOD PRESSURE: 197 MMHG | OXYGEN SATURATION: 98 % | DIASTOLIC BLOOD PRESSURE: 82 MMHG | WEIGHT: 153 LBS | BODY MASS INDEX: 30.04 KG/M2 | HEART RATE: 66 BPM

## 2023-12-12 VITALS
TEMPERATURE: 97 F | HEIGHT: 60 IN | SYSTOLIC BLOOD PRESSURE: 201 MMHG | BODY MASS INDEX: 30.04 KG/M2 | HEART RATE: 64 BPM | WEIGHT: 153 LBS | OXYGEN SATURATION: 99 % | DIASTOLIC BLOOD PRESSURE: 74 MMHG

## 2023-12-12 DIAGNOSIS — N20.0 CALCULUS OF KIDNEY: ICD-10-CM

## 2023-12-12 PROCEDURE — 76775 US EXAM ABDO BACK WALL LIM: CPT

## 2023-12-12 PROCEDURE — 99213 OFFICE O/P EST LOW 20 MIN: CPT

## 2024-04-08 ENCOUNTER — APPOINTMENT (OUTPATIENT)
Dept: ENDOCRINOLOGY | Facility: CLINIC | Age: 74
End: 2024-04-08
Payer: MEDICARE

## 2024-04-08 VITALS
BODY MASS INDEX: 30.04 KG/M2 | WEIGHT: 153 LBS | DIASTOLIC BLOOD PRESSURE: 70 MMHG | OXYGEN SATURATION: 98 % | SYSTOLIC BLOOD PRESSURE: 130 MMHG | TEMPERATURE: 97.3 F | HEIGHT: 60 IN | RESPIRATION RATE: 16 BRPM | HEART RATE: 60 BPM

## 2024-04-08 DIAGNOSIS — N18.30 CHRONIC KIDNEY DISEASE, STAGE 3 UNSPECIFIED: ICD-10-CM

## 2024-04-08 DIAGNOSIS — I10 ESSENTIAL (PRIMARY) HYPERTENSION: ICD-10-CM

## 2024-04-08 DIAGNOSIS — E11.9 TYPE 2 DIABETES MELLITUS W/OUT COMPLICATIONS: ICD-10-CM

## 2024-04-08 LAB
GLUCOSE BLDC GLUCOMTR-MCNC: 205
HBA1C MFR BLD HPLC: 7.3

## 2024-04-08 PROCEDURE — G2211 COMPLEX E/M VISIT ADD ON: CPT

## 2024-04-08 PROCEDURE — 82962 GLUCOSE BLOOD TEST: CPT

## 2024-04-08 PROCEDURE — 99204 OFFICE O/P NEW MOD 45 MIN: CPT

## 2024-04-08 RX ORDER — INSULIN GLARGINE AND LIXISENATIDE 100; 33 U/ML; UG/ML
100-33 INJECTION, SOLUTION SUBCUTANEOUS
Qty: 4 | Refills: 1 | Status: ACTIVE | COMMUNITY
Start: 2024-04-08

## 2024-04-08 NOTE — ASSESSMENT
[FreeTextEntry1] : Pt is a 74 y/o female with kidney stones (sees urology), CKD (sees nephro), cataracts s/p b/l lens replacement, DM2.  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 Occasionally dropping into 70s or up to 150s. Infrequent lows below 70. -Continue soliqua same dose -Consider adding on jardiance (discuss prior hx of osteomyelitis in more detail) after labs -Other meds can also be considered like actos depending on results of labs -Patient advised to check FSBG twice daily in staggered manner and bring logs to next visit -Obtain lipid profile, LPa, A1c, CMP, CBC at time she gets nephro labs in May -Obtain urine albumin-to-creatinine ratio to screen for moderately increased albuminuria -UTD with ophthalmologist for dilated eye exam -Referral to nutritionist declined   HTN -Goal BP <130/80. Managed by nephro   HLD -Check lipid profile and LPa -Continue atorvastatin 40mg daily  Osteopenia Last dexa 2022 Repeating dxa soon with gyn  RTC 3 months.   Balbir Mendez DO

## 2024-04-08 NOTE — PHYSICAL EXAM
[Alert] : alert [Well Nourished] : well nourished [No Acute Distress] : no acute distress [EOMI] : extra ocular movement intact [No Proptosis] : no proptosis [No Lid Lag] : no lid lag [Supple] : the neck was supple [Thyroid Not Enlarged] : the thyroid was not enlarged [No Respiratory Distress] : no respiratory distress [No Accessory Muscle Use] : no accessory muscle use [Normal Rate and Effort] : normal respiratory rate and effort [Normal Rate] : heart rate was normal [Regular Rhythm] : with a regular rhythm [Not Tender] : non-tender [Soft] : abdomen soft [No Spinal Tenderness] : no spinal tenderness [Spine Straight] : spine straight [Kyphosis] : no kyphosis present [Cranial Nerves Intact] : cranial nerves 2-12 were intact [No Motor Deficits] : the motor exam was normal [Oriented x3] : oriented to person, place, and time [Normal Affect] : the affect was normal [Normal Mood] : the mood was normal [de-identified] : Trace pitting edema RLE.

## 2024-06-18 ENCOUNTER — APPOINTMENT (OUTPATIENT)
Dept: UROLOGY | Facility: CLINIC | Age: 74
End: 2024-06-18

## 2024-07-11 ENCOUNTER — APPOINTMENT (OUTPATIENT)
Dept: ENDOCRINOLOGY | Facility: CLINIC | Age: 74
End: 2024-07-11
Payer: MEDICARE

## 2024-07-11 ENCOUNTER — NON-APPOINTMENT (OUTPATIENT)
Age: 74
End: 2024-07-11

## 2024-07-11 VITALS
BODY MASS INDEX: 30.82 KG/M2 | TEMPERATURE: 97.6 F | HEART RATE: 69 BPM | SYSTOLIC BLOOD PRESSURE: 178 MMHG | RESPIRATION RATE: 16 BRPM | WEIGHT: 157 LBS | OXYGEN SATURATION: 97 % | HEIGHT: 60 IN | DIASTOLIC BLOOD PRESSURE: 79 MMHG

## 2024-07-11 DIAGNOSIS — I10 ESSENTIAL (PRIMARY) HYPERTENSION: ICD-10-CM

## 2024-07-11 DIAGNOSIS — N20.0 CALCULUS OF KIDNEY: ICD-10-CM

## 2024-07-11 LAB
GLUCOSE BLDC GLUCOMTR-MCNC: 170
HBA1C MFR BLD HPLC: 7.1

## 2024-07-11 PROCEDURE — 83036 HEMOGLOBIN GLYCOSYLATED A1C: CPT | Mod: QW

## 2024-07-11 PROCEDURE — 82962 GLUCOSE BLOOD TEST: CPT

## 2024-07-11 PROCEDURE — 99215 OFFICE O/P EST HI 40 MIN: CPT

## 2024-07-11 PROCEDURE — G2211 COMPLEX E/M VISIT ADD ON: CPT

## 2024-07-17 ENCOUNTER — NON-APPOINTMENT (OUTPATIENT)
Age: 74
End: 2024-07-17

## 2024-07-17 DIAGNOSIS — E11.9 TYPE 2 DIABETES MELLITUS W/OUT COMPLICATIONS: ICD-10-CM

## 2024-07-24 ENCOUNTER — APPOINTMENT (OUTPATIENT)
Dept: UROLOGY | Facility: CLINIC | Age: 74
End: 2024-07-24
Payer: MEDICARE

## 2024-07-24 VITALS
TEMPERATURE: 97.2 F | SYSTOLIC BLOOD PRESSURE: 187 MMHG | DIASTOLIC BLOOD PRESSURE: 77 MMHG | HEART RATE: 69 BPM | BODY MASS INDEX: 30.82 KG/M2 | HEIGHT: 60 IN | OXYGEN SATURATION: 97 % | WEIGHT: 157 LBS

## 2024-07-24 DIAGNOSIS — N20.0 CALCULUS OF KIDNEY: ICD-10-CM

## 2024-07-24 DIAGNOSIS — N18.30 CHRONIC KIDNEY DISEASE, STAGE 3 UNSPECIFIED: ICD-10-CM

## 2024-07-24 PROCEDURE — 99213 OFFICE O/P EST LOW 20 MIN: CPT

## 2024-07-24 PROCEDURE — G2211 COMPLEX E/M VISIT ADD ON: CPT

## 2024-07-24 PROCEDURE — 76775 US EXAM ABDO BACK WALL LIM: CPT

## 2024-07-24 NOTE — ASSESSMENT
[FreeTextEntry1] : Very pleasant 74-year-old woman who presents for follow-up of kidney stones -Ultrasound images reviewed demonstrating no kidney stones, hydronephrosis, renal masses -Continue stone prevention strategies as previously discussed -Follow-up in 6 months with repeat renal ultrasound for stone surveillance  Patient is being seen today for evaluation and management of a chronic and longitudinal ongoing condition and I am of the primary treating physician   I have spent 22 minutes on this encounter, exclusive of separately billed services

## 2024-07-24 NOTE — HISTORY OF PRESENT ILLNESS
[FreeTextEntry1] : Very pleasant 74-year-old woman who presents for follow-up of history of kidney stones.  She feels well.  She denies dysuria.  No hematuria.  No flank pain or suprapubic pain.  She underwent a repeat renal ultrasound today which demonstrates no kidney stones, hydronephrosis, nor renal masses.  She was previously prescribed potassium citrate, however was advised by her nephrologist that she can no longer take the medication.  She reports that she is now taking Jardiance and Ozempic for diabetes.

## 2024-07-29 NOTE — H&P ADULT - PROBLEM SELECTOR PROBLEM 7
Patient Visit Information:   Visit Type: Follow Up Visit      Cancer History:   Treatment Synopsis:    1.  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      This time patient presented with a left parotid gland mass      February 9, 2023 CAT scan of the neck did show 1.5 cm mass arising from or adjacent to the superficial lobe of the left parotid..     March 1, 2023, left parotid gland mass biopsy positive for melanoma.  Preauricular skin biopsy positive for invasive squamous cell carcinoma extending to deep margin      ENT evaluation done        April 16, 2023, PET scan, slight abnormal metabolic activity of left parotid gland seen, no evidence of metastatic disease      MRI brain negative for metastatic disease      May 5,   2023, status post left parotidectomy and left neck dissection      Final pathology did show metastatic melanoma measuring 2 cm surgical margin 1 mm and level 2, level 3, lymph node dissection, no evidence of lymph node metastatic disease, total 42 lymph node examined.      Current treatment, pembrolizumab, started June 26, 2023  Status post 5 doses of pembrolizumab, stopped in November 2023 because of acute arthritis   5/10/24 , nivolumab started   6/7/24 , c2  7/5/24 , c3  8/2/24 , c4  History of Present Illness:      ID Statement:    MARIA INES MORTON is a 80 year old Male        Chief Complaint: Melanoma of left parotid gland   Interval History:    Patient is a 80-year-old gentleman with history of hypertension, type 2 diabetes mellitus, hypercholesterolemia, atrial  fibrillation and melanoma of left ear which  were diagnosed 2030.      This time patient presented with a left parotid gland mass      February 9, 2023 CAT scan of the neck did show 1.5 cm mass arising from or adjacent to the superficial lobe of the left parotid..     March 1, 2023, left parotid gland mass biopsy positive for melanoma.  Preauricular skin biopsy positive for invasive squamous cell carcinoma extending to deep margin      ENT evaluation done      April 16, 2023, PET scan, slight abnormal metabolic activity of left parotid gland seen, no evidence of metastatic disease      MRI brain negative for metastatic disease      May 5,   2023, status post left parotidectomy and left neck dissection      Final pathology did show metastatic melanoma measuring 2 cm surgical margin 1 mm and level 2, level 3, lymph node dissection, no evidence of lymph node metastatic disease, total 42 lymph node examined.         Medical oncology consultation is requesting for recurrent melanoma treatment.      Postoperatively patient doing very well, patient is still active, no headache and dizziness, no bony pain, no chest pain, no shortness of breath, no hemoptysis, no nausea vomiting, no abdominal pain, no diarrhea and constipation, no dysuria or hematuria,  patient has some arthritis      PET scan, pathology MRI result discussed with patient     7/29/24     Metastatic melanoma, current treatment pembrolizumab, after second dose of pembrolizumab patient developed severe swelling of both hands with arthritis and severe muscular pain.  Pembrolizumab was stopped.  Patient still complaining of joint pain shoulder pain and swelling of small joint, today patient comes for follow-up visit.     During last visit patient noticed a small purpleish color nodules of the left neck and nivolumab was started on May 10, 2024.  So far it well-tolerated.  Patient complaining some arthritis, no chest pain no shortness of breath, no diarrhea      Review of Systems:   Review of Systems:    As mentioned above        Allergies and Intolerances:       Allergies:         No Known Allergies: Active     Outpatient Medication Profile:  * Patient Currently Takes Medications as of 07-Aug-2023 14:35 documented in Structured Notes         atorvastatin 10 mg oral tablet : Last Dose Taken:  , 0.5 tab(s) orally once a day (at bedtime)         apixaban 5 mg oral tablet: Last Dose Taken:  , 1 tab(s) orally 2 times  a day         glipiZIDE 5 mg oral tablet: Last Dose Taken:  , 1 tab(s) orally 2 times  a day         lisinopril 20 mg oral tablet: Last Dose Taken:  , 1 tab(s) orally once  a day         metFORMIN 500 mg oral tablet: Last Dose Taken:  , 2 tab(s) orally 2 times  a day         metoprolol succinate 100 mg oral tablet, extended release: Last Dose  Taken:  , 1 tab(s) orally once a day in the evening          metoprolol succinate 50 mg oral tablet, extended release: Last Dose Taken:   , 1 tab(s) orally once a day in the morning          multivitamin Multiple Vitamins oral tablet: Last Dose Taken:  , 0.5 tab(s)  orally once a day             Medical History:         Metastatic melanoma: ICD-10: C43.9, Status: Active         Metastatic melanoma: ICD-10: C43.9, Status: Active         Malignant neoplasm of parotid gland: ICD-10: C07, Status:  Active         Head and neck cancer: ICD-10: C76.0, Status: Active         Squamous cell carcinoma of skin of sideburn: ICD-10: C44.329,  Status: Active         Hypercholesterolemia: ICD-10: E78.00, Status: Active         Diabetes mellitus: ICD-10: E11.9, Status: Active         Hypertension: ICD-10: I10, Status: Active         Metastatic melanoma to parotid gland: ICD-10: C79.89, Status:  Active         Malignant melanoma of skin of left ear and external auditory canal : ICD-10: C43.22, Status: Active     Family History: No Family History items are recorded  in the problem list.      Social History:   Social Substance History:  ·   Smoking Status unknown if ever smoked (1)            Vitals and Measurements:   Vitals: Temp: 36.5  HR: 84  RR: 16  BP: 147/93  SPO2%:   96   Measurements: HT(cm): 181.2  WT(kg): 134.4  BSA:  2.6  BMI:  40.9   Last 3 Weights & Heights: Date:                           Weight/Scale Type:                    Height:   07-Aug-2023 14:33                134.4  kg                     181.2  cm  17-Jul-2023 08:26                135  kg                     181.2  cm  26-Jun-2023 08:25                136.4  kg                     181.2  cm      Physical Exam:      Constitutional: awake/alert/oriented x3, no distress,   Eyes: PERRL, EOMI, clear sclera   ENMT: mucous membranes moist, no apparent injury,  no lesions seen   Head/Neck: Neck supple, status post left parotidectomy  and neck dissection, well-healed surgical scar, small nodule is not noticeable just above surgical scar 1 cm which is nontender and hard and purplish color, measuring 1.5 cm   Respiratory/Thorax: Patent airways, CTAB, normal  breath sounds   Cardiovascular: Regular, rate and rhythm,   normal  S 1and S 2   Gastrointestinal: Nondistended, soft, non-tender,   , no masses palpable,  +BS,   Musculoskeletal: ROM intact, no joint swelling,   Extremities: normal extremities, finger joint swelling, some tenderness, no redness   Neurological: alert and oriented x3, intact senses,  motor, response and reflexes, normal strength   Lymphatic: No significant lymphadenopathy   Psychological: Appropriate mood and behavior   Skin: Warm and dry, no lesions, no rashes         Lab Results:       nt  Ref Range & Units 10:25 3 wk ago 1 mo ago 2 mo ago 3 mo ago 6 mo ago 8 mo ago   WBC  4.4 - 11.3 x10*3/uL 7.9 8.3 7.6 7.5 8.1 7.6 9.2   RBC  4.50 - 5.90 x10*6/uL 5.14 5.17 4.99 5.16 4.97 4.91 5.07   Hemoglobin  13.5 - 17.5 g/dL 15.4 15.5 15.0 15.5 15.0 14.3 15.1   Hematocrit  41.0 - 52.0 % 45.5 46.4 44.8 46.3 44.7 43.9 45.2   MCV  80 - 100 fL 89 90 90 90 90 89 89   MCH  26.0 - 34.0  HTN (hypertension) pg 30.0 30.0 30.1 30.0 30.2 29.1 29.8   MCHC  32.0 - 36.0 g/dL 33.8 33.4 33.5 33.5 33.6 32.6 33.4   RDW  11.5 - 14.5 % 13.8 13.7 14.3 14.1 14.0 14.4 13.9   Platelets  150 - 450 x10*3/uL 178 186 203           ·  Results            Assessment and Plan:   Assessment:     1.  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      This time patient presented with a left parotid gland mass      February 9, 2023 CAT scan of the neck did show 1.5 cm mass arising from or adjacent to the superficial lobe of the left parotid..     March 1, 2023, left parotid gland mass biopsy positive for melanoma.  Preauricular skin biopsy positive for invasive squamous cell carcinoma extending to deep margin      ENT evaluation done        April 16, 2023, PET scan, slight abnormal metabolic activity of left parotid gland seen, no evidence of metastatic disease      MRI brain negative for metastatic disease      May 5,   2023, status post left parotidectomy and left neck dissection      Final pathology did show metastatic melanoma measuring 2 cm surgical margin 1 mm and level 2, level 3, lymph node dissection, no evidence of lymph node metastatic disease, total 42 lymph node examined.     Current treatment, pembrolizumab, started June 26, 2023, was held in December 2023  Now patient has a recurrent melanoma on the basis of physical examination   5/10/24 , nivolumab started   6/7/24 , c2  7/5/24 , c3  8/2/24 , c4     7/29/24      1.  Metastatic melanoma to the left  parotid gland, status post left parotidectomy and left neck dissection on May 5, 2023.  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  Discussed with the patient I will continue avelumab reevaluation after 3 months.             Time spent 30 minutes   DM (diabetes mellitus)

## 2024-08-05 ENCOUNTER — NON-APPOINTMENT (OUTPATIENT)
Age: 74
End: 2024-08-05

## 2024-08-05 RX ORDER — EMPAGLIFLOZIN 25 MG/1
25 TABLET, FILM COATED ORAL DAILY
Qty: 90 | Refills: 1 | Status: ACTIVE | COMMUNITY
Start: 2024-08-02 | End: 1900-01-01

## 2024-08-05 RX ORDER — SEMAGLUTIDE 0.68 MG/ML
2 INJECTION, SOLUTION SUBCUTANEOUS
Qty: 1 | Refills: 3 | Status: ACTIVE | COMMUNITY
Start: 2024-08-02 | End: 1900-01-01

## 2024-09-11 ENCOUNTER — APPOINTMENT (OUTPATIENT)
Dept: ENDOCRINOLOGY | Facility: CLINIC | Age: 74
End: 2024-09-11
Payer: MEDICARE

## 2024-09-11 VITALS
BODY MASS INDEX: 28.07 KG/M2 | TEMPERATURE: 97.9 F | SYSTOLIC BLOOD PRESSURE: 154 MMHG | HEIGHT: 60 IN | HEART RATE: 79 BPM | DIASTOLIC BLOOD PRESSURE: 64 MMHG | RESPIRATION RATE: 16 BRPM | WEIGHT: 143 LBS | OXYGEN SATURATION: 96 %

## 2024-09-11 DIAGNOSIS — E11.9 TYPE 2 DIABETES MELLITUS W/OUT COMPLICATIONS: ICD-10-CM

## 2024-09-11 DIAGNOSIS — N18.30 CHRONIC KIDNEY DISEASE, STAGE 3 UNSPECIFIED: ICD-10-CM

## 2024-09-11 DIAGNOSIS — I10 ESSENTIAL (PRIMARY) HYPERTENSION: ICD-10-CM

## 2024-09-11 LAB
GLUCOSE BLDC GLUCOMTR-MCNC: 273
HBA1C MFR BLD HPLC: 9.9

## 2024-09-11 PROCEDURE — 99214 OFFICE O/P EST MOD 30 MIN: CPT

## 2024-09-11 PROCEDURE — G2211 COMPLEX E/M VISIT ADD ON: CPT

## 2024-09-11 PROCEDURE — 83036 HEMOGLOBIN GLYCOSYLATED A1C: CPT | Mod: QW

## 2024-09-11 PROCEDURE — 82962 GLUCOSE BLOOD TEST: CPT

## 2024-09-11 NOTE — HISTORY OF PRESENT ILLNESS
[FreeTextEntry1] : Pt is a 73 y/o female with kidney stones (sees urology), CKD (sees nephro), cataracts s/p b/l lens replacement, DM2.  DM diagnosis: 20 years Last A1c: 7.3%-->7.1%-->9.9% Home DM meds: On jardiance 25mg daily and ozempic 0.5mg weekly. Losing weight, no side effects. SMBG: BG in the 300s. Symptoms: No N/V/D Diet at home: Mainly eats between 11am and 6pm. Sometimes uses low carb protein shake as supplement. Microvascular complications: Has CKD (GFR in 30s per patient), UTD with retina specialist, no active retinopathy. Gets neuropathy Macrovascular complications: No CVA, MI, PAD ACEi/ARB: losartan 50mg daily Statin: atorva 40mg daily PMHx: As above. No hx of HF, pancreatitis, UTIs/yeast infections. Sees podiatry for bunion. No amputations or wounds actively but hx shows osteomyelitis but that resolved. Sees podiatry. No hx of DKA. FHx: Father with DM as well as brother. No thyroid cancer. SHx: No tobacco or etoh use.  Had kidney stone in July 2023. Urine calcium on litholink was low.  Nephrologist, Dr. Gage Mayorga - [phone]

## 2024-09-11 NOTE — ASSESSMENT
[FreeTextEntry1] : Pt is a 75 y/o female with kidney stones (sees urology), CKD (sees nephro), cataracts s/p b/l lens replacement, DM2.  Uncontrolled T2DM complicated by neuropathy DM diagnosis: 20 years Last A1c: 7.3%-->7.1%-->9.9% Home DM meds: On jardiance 25mg daily and ozempic 0.5mg weekly. Losing weight, no side effects. SMBG: BG in the 300s. Symptoms: No N/V/D -Reduce jardiance to 10mg daily given GFR ~20. -Increase ozempic to 1.0mg weekly after 2 more weeks of 0.5mg weekly. -Restart Tresiba 30 units qhs. To advise nephro that she's starting insulin as it may lower potassium and she just started kayexalate for high K. -Patient advised to check FSBG twice daily in staggered manner and bring logs to next visit -Obtain urine albumin-to-creatinine ratio to confirm for moderately increased albuminuria next time -UTD with ophthalmologist for dilated eye exam -Referral to nutritionist declined  HTN -Goal BP <130/80. Managed by nephro  HLD -Continue atorvastatin 40mg daily  RTC 3 months.  Balbir Mendez DO.

## 2024-11-25 ENCOUNTER — APPOINTMENT (OUTPATIENT)
Dept: ENDOCRINOLOGY | Facility: CLINIC | Age: 74
End: 2024-11-25
Payer: MEDICARE

## 2024-11-25 VITALS
HEIGHT: 60 IN | SYSTOLIC BLOOD PRESSURE: 134 MMHG | WEIGHT: 141 LBS | BODY MASS INDEX: 27.68 KG/M2 | TEMPERATURE: 96.4 F | HEART RATE: 62 BPM | DIASTOLIC BLOOD PRESSURE: 75 MMHG | OXYGEN SATURATION: 98 % | RESPIRATION RATE: 16 BRPM

## 2024-11-25 DIAGNOSIS — E78.5 HYPERLIPIDEMIA, UNSPECIFIED: ICD-10-CM

## 2024-11-25 DIAGNOSIS — N18.30 CHRONIC KIDNEY DISEASE, STAGE 3 UNSPECIFIED: ICD-10-CM

## 2024-11-25 DIAGNOSIS — I10 ESSENTIAL (PRIMARY) HYPERTENSION: ICD-10-CM

## 2024-11-25 DIAGNOSIS — E11.9 TYPE 2 DIABETES MELLITUS W/OUT COMPLICATIONS: ICD-10-CM

## 2024-11-25 PROCEDURE — G2211 COMPLEX E/M VISIT ADD ON: CPT

## 2024-11-25 PROCEDURE — 99214 OFFICE O/P EST MOD 30 MIN: CPT

## 2024-11-25 PROCEDURE — 82962 GLUCOSE BLOOD TEST: CPT

## 2024-11-25 RX ORDER — PEN NEEDLE, DIABETIC 29 G X1/2"
32G X 4 MM NEEDLE, DISPOSABLE MISCELLANEOUS
Qty: 1 | Refills: 1 | Status: ACTIVE | COMMUNITY
Start: 2024-11-25 | End: 1900-01-01

## 2024-12-03 LAB — GLUCOSE BLDC GLUCOMTR-MCNC: 148

## 2025-01-28 ENCOUNTER — APPOINTMENT (OUTPATIENT)
Dept: UROLOGY | Facility: CLINIC | Age: 75
End: 2025-01-28
Payer: MEDICARE

## 2025-01-28 VITALS
HEART RATE: 66 BPM | OXYGEN SATURATION: 95 % | TEMPERATURE: 96.3 F | SYSTOLIC BLOOD PRESSURE: 145 MMHG | DIASTOLIC BLOOD PRESSURE: 84 MMHG

## 2025-01-28 DIAGNOSIS — N20.0 CALCULUS OF KIDNEY: ICD-10-CM

## 2025-01-28 PROCEDURE — 99213 OFFICE O/P EST LOW 20 MIN: CPT

## 2025-01-28 PROCEDURE — G2211 COMPLEX E/M VISIT ADD ON: CPT

## 2025-01-28 PROCEDURE — 76775 US EXAM ABDO BACK WALL LIM: CPT

## 2025-01-30 ENCOUNTER — RX RENEWAL (OUTPATIENT)
Age: 75
End: 2025-01-30

## 2025-03-05 ENCOUNTER — APPOINTMENT (OUTPATIENT)
Dept: ENDOCRINOLOGY | Facility: CLINIC | Age: 75
End: 2025-03-05
Payer: MEDICARE

## 2025-03-05 VITALS
OXYGEN SATURATION: 98 % | TEMPERATURE: 97.3 F | BODY MASS INDEX: 28.47 KG/M2 | SYSTOLIC BLOOD PRESSURE: 158 MMHG | RESPIRATION RATE: 16 BRPM | HEART RATE: 60 BPM | DIASTOLIC BLOOD PRESSURE: 80 MMHG | HEIGHT: 60 IN | WEIGHT: 145 LBS

## 2025-03-05 DIAGNOSIS — E11.9 TYPE 2 DIABETES MELLITUS W/OUT COMPLICATIONS: ICD-10-CM

## 2025-03-05 DIAGNOSIS — E78.5 HYPERLIPIDEMIA, UNSPECIFIED: ICD-10-CM

## 2025-03-05 DIAGNOSIS — I10 ESSENTIAL (PRIMARY) HYPERTENSION: ICD-10-CM

## 2025-03-05 DIAGNOSIS — M81.0 AGE-RELATED OSTEOPOROSIS W/OUT CURRENT PATHOLOGICAL FRACTURE: ICD-10-CM

## 2025-03-05 LAB
GLUCOSE BLDC GLUCOMTR-MCNC: 101
HBA1C MFR BLD HPLC: 7.6

## 2025-03-05 PROCEDURE — 99215 OFFICE O/P EST HI 40 MIN: CPT

## 2025-03-05 PROCEDURE — 82962 GLUCOSE BLOOD TEST: CPT

## 2025-03-05 PROCEDURE — 83036 HEMOGLOBIN GLYCOSYLATED A1C: CPT | Mod: QW

## 2025-03-05 PROCEDURE — G2211 COMPLEX E/M VISIT ADD ON: CPT

## 2025-03-05 RX ORDER — AMLODIPINE BESYLATE 5 MG/1
5 TABLET ORAL
Qty: 90 | Refills: 1 | Status: ACTIVE | COMMUNITY
Start: 2025-03-05 | End: 1900-01-01

## 2025-03-06 RX ORDER — GLUCAGON 1 MG
1 KIT INJECTION
Qty: 1 | Refills: 5 | Status: ACTIVE | COMMUNITY
Start: 2025-03-06 | End: 1900-01-01

## 2025-03-21 ENCOUNTER — APPOINTMENT (OUTPATIENT)
Dept: ENDOCRINOLOGY | Facility: CLINIC | Age: 75
End: 2025-03-21
Payer: MEDICARE

## 2025-03-21 DIAGNOSIS — E11.9 TYPE 2 DIABETES MELLITUS W/OUT COMPLICATIONS: ICD-10-CM

## 2025-03-21 PROCEDURE — G0108 DIAB MANAGE TRN  PER INDIV: CPT

## 2025-03-21 PROCEDURE — 95249 CONT GLUC MNTR PT PROV EQP: CPT

## 2025-03-21 PROCEDURE — 95251 CONT GLUC MNTR ANALYSIS I&R: CPT

## 2025-03-21 RX ORDER — GLUCAGON INJECTION, SOLUTION 1 MG/.2ML
1 INJECTION, SOLUTION SUBCUTANEOUS
Qty: 1 | Refills: 1 | Status: ACTIVE | COMMUNITY
Start: 2025-03-21 | End: 1900-01-01

## 2025-03-21 RX ORDER — URINE ACETONE TEST STRIPS
STRIP MISCELLANEOUS
Qty: 1 | Refills: 0 | Status: ACTIVE | COMMUNITY
Start: 2025-03-21 | End: 1900-01-01

## 2025-04-14 ENCOUNTER — NON-APPOINTMENT (OUTPATIENT)
Age: 75
End: 2025-04-14

## 2025-04-16 ENCOUNTER — TRANSCRIPTION ENCOUNTER (OUTPATIENT)
Age: 75
End: 2025-04-16

## 2025-04-16 ENCOUNTER — APPOINTMENT (OUTPATIENT)
Dept: ENDOCRINOLOGY | Facility: CLINIC | Age: 75
End: 2025-04-16
Payer: MEDICARE

## 2025-04-16 VITALS
BODY MASS INDEX: 28.71 KG/M2 | WEIGHT: 147 LBS | SYSTOLIC BLOOD PRESSURE: 128 MMHG | HEART RATE: 63 BPM | OXYGEN SATURATION: 98 % | DIASTOLIC BLOOD PRESSURE: 77 MMHG | TEMPERATURE: 97.6 F | RESPIRATION RATE: 16 BRPM

## 2025-04-16 DIAGNOSIS — M81.0 AGE-RELATED OSTEOPOROSIS W/OUT CURRENT PATHOLOGICAL FRACTURE: ICD-10-CM

## 2025-04-16 DIAGNOSIS — N18.30 CHRONIC KIDNEY DISEASE, STAGE 3 UNSPECIFIED: ICD-10-CM

## 2025-04-16 DIAGNOSIS — I10 ESSENTIAL (PRIMARY) HYPERTENSION: ICD-10-CM

## 2025-04-16 DIAGNOSIS — E78.5 HYPERLIPIDEMIA, UNSPECIFIED: ICD-10-CM

## 2025-04-16 PROCEDURE — G2211 COMPLEX E/M VISIT ADD ON: CPT

## 2025-04-16 PROCEDURE — 99215 OFFICE O/P EST HI 40 MIN: CPT

## 2025-06-11 ENCOUNTER — LABORATORY RESULT (OUTPATIENT)
Age: 75
End: 2025-06-11

## 2025-06-16 ENCOUNTER — APPOINTMENT (OUTPATIENT)
Dept: ENDOCRINOLOGY | Facility: CLINIC | Age: 75
End: 2025-06-16
Payer: MEDICARE

## 2025-06-16 VITALS
OXYGEN SATURATION: 99 % | DIASTOLIC BLOOD PRESSURE: 76 MMHG | WEIGHT: 136 LBS | RESPIRATION RATE: 16 BRPM | HEART RATE: 64 BPM | SYSTOLIC BLOOD PRESSURE: 131 MMHG | BODY MASS INDEX: 26.56 KG/M2 | TEMPERATURE: 97.6 F

## 2025-06-16 LAB — HBA1C MFR BLD HPLC: 6.3

## 2025-06-16 PROCEDURE — G2211 COMPLEX E/M VISIT ADD ON: CPT

## 2025-06-16 PROCEDURE — 99215 OFFICE O/P EST HI 40 MIN: CPT

## 2025-06-16 PROCEDURE — 83036 HEMOGLOBIN GLYCOSYLATED A1C: CPT | Mod: QW

## 2025-06-16 PROCEDURE — 36415 COLL VENOUS BLD VENIPUNCTURE: CPT

## 2025-06-16 PROCEDURE — 95251 CONT GLUC MNTR ANALYSIS I&R: CPT

## 2025-06-16 RX ORDER — CLONIDINE HYDROCHLORIDE 0.1 MG/1
0.1 TABLET ORAL TWICE DAILY
Qty: 180 | Refills: 1 | Status: ACTIVE | COMMUNITY
Start: 2025-06-16 | End: 1900-01-01

## 2025-06-16 RX ORDER — ELECTROLYTES/DEXTROSE
32G X 4 MM SOLUTION, ORAL ORAL
Qty: 1 | Refills: 1 | Status: ACTIVE | COMMUNITY
Start: 2025-06-16 | End: 1900-01-01

## 2025-06-17 LAB
ANION GAP SERPL CALC-SCNC: 20 MMOL/L
BUN SERPL-MCNC: 44 MG/DL
CALCIUM SERPL-MCNC: 9.9 MG/DL
CHLORIDE SERPL-SCNC: 108 MMOL/L
CO2 SERPL-SCNC: 12 MMOL/L
CREAT SERPL-MCNC: 2.14 MG/DL
EGFRCR SERPLBLD CKD-EPI 2021: 24 ML/MIN/1.73M2
GLUCOSE SERPL-MCNC: 50 MG/DL
POTASSIUM SERPL-SCNC: 5.2 MMOL/L
SODIUM SERPL-SCNC: 139 MMOL/L

## 2025-07-16 RX ORDER — LOSARTAN POTASSIUM 50 MG/1
50 TABLET, FILM COATED ORAL DAILY
Qty: 90 | Refills: 1 | Status: ACTIVE | COMMUNITY
Start: 2025-07-15 | End: 1900-01-01

## 2025-07-30 ENCOUNTER — APPOINTMENT (OUTPATIENT)
Dept: UROLOGY | Facility: CLINIC | Age: 75
End: 2025-07-30

## 2025-07-30 ENCOUNTER — APPOINTMENT (OUTPATIENT)
Dept: UROLOGY | Facility: CLINIC | Age: 75
End: 2025-07-30
Payer: MEDICARE

## 2025-07-30 VITALS
TEMPERATURE: 97.4 F | WEIGHT: 137 LBS | OXYGEN SATURATION: 96 % | BODY MASS INDEX: 26.9 KG/M2 | HEIGHT: 60 IN | SYSTOLIC BLOOD PRESSURE: 146 MMHG | HEART RATE: 76 BPM | DIASTOLIC BLOOD PRESSURE: 71 MMHG

## 2025-07-30 DIAGNOSIS — N20.0 CALCULUS OF KIDNEY: ICD-10-CM

## 2025-07-30 PROCEDURE — 76775 US EXAM ABDO BACK WALL LIM: CPT

## 2025-07-30 PROCEDURE — G2211 COMPLEX E/M VISIT ADD ON: CPT

## 2025-07-30 PROCEDURE — 99213 OFFICE O/P EST LOW 20 MIN: CPT

## 2025-08-15 ENCOUNTER — APPOINTMENT (OUTPATIENT)
Dept: NEPHROLOGY | Facility: CLINIC | Age: 75
End: 2025-08-15
Payer: MEDICARE

## 2025-08-15 VITALS
DIASTOLIC BLOOD PRESSURE: 65 MMHG | TEMPERATURE: 97.3 F | SYSTOLIC BLOOD PRESSURE: 157 MMHG | HEIGHT: 60 IN | HEART RATE: 66 BPM | BODY MASS INDEX: 27.88 KG/M2 | WEIGHT: 142 LBS | OXYGEN SATURATION: 97 %

## 2025-08-15 DIAGNOSIS — E11.9 TYPE 2 DIABETES MELLITUS W/OUT COMPLICATIONS: ICD-10-CM

## 2025-08-15 DIAGNOSIS — N18.30 CHRONIC KIDNEY DISEASE, STAGE 3 UNSPECIFIED: ICD-10-CM

## 2025-08-15 DIAGNOSIS — I10 ESSENTIAL (PRIMARY) HYPERTENSION: ICD-10-CM

## 2025-08-15 DIAGNOSIS — R80.9 PROTEINURIA, UNSPECIFIED: ICD-10-CM

## 2025-08-15 DIAGNOSIS — N18.4 CHRONIC KIDNEY DISEASE, STAGE 4 (SEVERE): ICD-10-CM

## 2025-08-15 DIAGNOSIS — E78.5 HYPERLIPIDEMIA, UNSPECIFIED: ICD-10-CM

## 2025-08-15 LAB
ALBUMIN SERPL ELPH-MCNC: 4.4 G/DL
ALBUMIN, RANDOM URINE: 2.6 MG/DL
ANION GAP SERPL CALC-SCNC: 17 MMOL/L
APPEARANCE: CLEAR
BACTERIA: NEGATIVE /HPF
BILIRUBIN URINE: NEGATIVE
BLOOD URINE: NEGATIVE
BUN SERPL-MCNC: 38 MG/DL
CALCIUM SERPL-MCNC: 10.1 MG/DL
CALCIUM SERPL-MCNC: 10.1 MG/DL
CAST: 0 /LPF
CHLORIDE SERPL-SCNC: 105 MMOL/L
CHOLEST SERPL-MCNC: 123 MG/DL
CO2 SERPL-SCNC: 19 MMOL/L
COLOR: YELLOW
CREAT SERPL-MCNC: 2.29 MG/DL
CREAT SERPL-MCNC: 2.29 MG/DL
CREAT SPEC-SCNC: 82 MG/DL
CREAT SPEC-SCNC: 82 MG/DL
CREAT/PROT UR: 0.1 RATIO
CYSTATIN C SERPL-MCNC: 2.85 MG/L
EGFRCR SERPLBLD CKD-EPI 2021: 22 ML/MIN/1.73M2
EGFRCR SERPLBLD CKD-EPI 2021: 22 ML/MIN/1.73M2
EGFRCR-CYS SERPLBLD CKD-EPI 2021: 19 ML/MIN/1.73M2
EPITHELIAL CELLS: 6 /HPF
ESTIMATED AVERAGE GLUCOSE: 134 MG/DL
GFR/BSA.PRED SERPLBLD CYS-BASED-ARV: 17 ML/MIN/1.73M2
GLUCOSE QUALITATIVE U: 100 MG/DL
GLUCOSE SERPL-MCNC: 80 MG/DL
HBA1C MFR BLD HPLC: 6.3 %
HCT VFR BLD CALC: 30.4 %
HDLC SERPL-MCNC: 39 MG/DL
HGB BLD-MCNC: 9.8 G/DL
KETONES URINE: NEGATIVE MG/DL
LDLC SERPL-MCNC: 60 MG/DL
LEUKOCYTE ESTERASE URINE: ABNORMAL
MCHC RBC-ENTMCNC: 31.9 PG
MCHC RBC-ENTMCNC: 32.2 G/DL
MCV RBC AUTO: 99 FL
MICROALBUMIN/CREAT 24H UR-RTO: 31 MG/G
MICROSCOPIC-UA: NORMAL
NITRITE URINE: NEGATIVE
NONHDLC SERPL-MCNC: 83 MG/DL
PARATHYROID HORMONE INTACT: 47 PG/ML
PH URINE: 6
PHOSPHATE SERPL-MCNC: 4 MG/DL
PLATELET # BLD AUTO: 217 K/UL
POTASSIUM SERPL-SCNC: 5.4 MMOL/L
PROT UR-MCNC: 10 MG/DL
PROTEIN URINE: 30 MG/DL
RBC # BLD: 3.07 M/UL
RBC # FLD: 13 %
RED BLOOD CELLS URINE: 2 /HPF
SODIUM SERPL-SCNC: 140 MMOL/L
SPECIFIC GRAVITY URINE: 1.01
TRIGL SERPL-MCNC: 135 MG/DL
URATE SERPL-MCNC: 5.5 MG/DL
UROBILINOGEN URINE: 0.2 MG/DL
WBC # FLD AUTO: 9.18 K/UL
WHITE BLOOD CELLS URINE: 11 /HPF

## 2025-08-15 PROCEDURE — G2211 COMPLEX E/M VISIT ADD ON: CPT

## 2025-08-15 PROCEDURE — 99205 OFFICE O/P NEW HI 60 MIN: CPT

## 2025-08-15 PROCEDURE — G2212 PROLONG OUTPT/OFFICE VIS: CPT

## 2025-08-15 RX ORDER — METOPROLOL SUCCINATE 50 MG/1
50 TABLET, EXTENDED RELEASE ORAL
Qty: 90 | Refills: 3 | Status: ACTIVE | COMMUNITY
Start: 2025-08-15 | End: 1900-01-01

## 2025-08-15 RX ORDER — TERAZOSIN 5 MG/1
5 CAPSULE ORAL DAILY
Qty: 90 | Refills: 3 | Status: ACTIVE | COMMUNITY
Start: 2025-08-15 | End: 1900-01-01

## 2025-08-15 RX ORDER — HYDROCHLOROTHIAZIDE 12.5 MG/1
12.5 TABLET ORAL
Qty: 90 | Refills: 3 | Status: ACTIVE | COMMUNITY
Start: 2025-08-15 | End: 1900-01-01

## 2025-08-15 RX ORDER — OLMESARTAN MEDOXOMIL 20 MG/1
20 TABLET, FILM COATED ORAL DAILY
Qty: 90 | Refills: 3 | Status: ACTIVE | COMMUNITY
Start: 2025-08-15 | End: 1900-01-01

## 2025-09-03 RX ORDER — TELMISARTAN 40 MG/1
40 TABLET ORAL
Qty: 90 | Refills: 3 | Status: ACTIVE | COMMUNITY
Start: 2025-09-03 | End: 1900-01-01

## 2025-09-03 RX ORDER — FUROSEMIDE 20 MG/1
20 TABLET ORAL
Qty: 90 | Refills: 3 | Status: ACTIVE | COMMUNITY
Start: 2025-09-03 | End: 1900-01-01

## 2025-09-13 ENCOUNTER — LABORATORY RESULT (OUTPATIENT)
Age: 75
End: 2025-09-13

## 2025-09-17 ENCOUNTER — APPOINTMENT (OUTPATIENT)
Dept: ENDOCRINOLOGY | Facility: CLINIC | Age: 75
End: 2025-09-17
Payer: MEDICARE

## 2025-09-17 VITALS
RESPIRATION RATE: 16 BRPM | HEART RATE: 83 BPM | DIASTOLIC BLOOD PRESSURE: 73 MMHG | WEIGHT: 139 LBS | OXYGEN SATURATION: 98 % | BODY MASS INDEX: 27.15 KG/M2 | SYSTOLIC BLOOD PRESSURE: 148 MMHG | TEMPERATURE: 97.6 F

## 2025-09-17 DIAGNOSIS — E11.9 TYPE 2 DIABETES MELLITUS W/OUT COMPLICATIONS: ICD-10-CM

## 2025-09-17 DIAGNOSIS — E78.5 HYPERLIPIDEMIA, UNSPECIFIED: ICD-10-CM

## 2025-09-17 DIAGNOSIS — I10 ESSENTIAL (PRIMARY) HYPERTENSION: ICD-10-CM

## 2025-09-17 DIAGNOSIS — M81.0 AGE-RELATED OSTEOPOROSIS W/OUT CURRENT PATHOLOGICAL FRACTURE: ICD-10-CM

## 2025-09-17 PROCEDURE — 95251 CONT GLUC MNTR ANALYSIS I&R: CPT

## 2025-09-17 PROCEDURE — G2211 COMPLEX E/M VISIT ADD ON: CPT

## 2025-09-17 PROCEDURE — 99215 OFFICE O/P EST HI 40 MIN: CPT

## 2025-09-17 RX ORDER — TIRZEPATIDE 5 MG/.5ML
5 INJECTION, SOLUTION SUBCUTANEOUS
Qty: 1 | Refills: 3 | Status: ACTIVE | COMMUNITY
Start: 2025-09-17 | End: 1900-01-01

## 2025-09-17 RX ORDER — ELECTROLYTES/DEXTROSE
32G X 4 MM SOLUTION, ORAL ORAL
Qty: 1 | Refills: 1 | Status: ACTIVE | COMMUNITY
Start: 2025-09-17 | End: 1900-01-01

## (undated) DEVICE — SOL IRR POUR H2O 1500ML

## (undated) DEVICE — TUBING RANGER FLUID IRRIGATION SET DISP

## (undated) DEVICE — SOL IRR BAG NS 0.9% 3000ML

## (undated) DEVICE — PACK CYSTO

## (undated) DEVICE — TUBING TUR 2 PRONG

## (undated) DEVICE — DRAPE EQUIPMENT BANDED BAG 30 X 30" (SHOWER CAP)

## (undated) DEVICE — SYR ASEPTO

## (undated) DEVICE — GLV 7.5 PROTEXIS (WHITE)

## (undated) DEVICE — SYR LUER LOK 10CC

## (undated) DEVICE — DRAPE C ARM UNIVERSAL

## (undated) DEVICE — CABLE DAC ACTIVE CORD

## (undated) DEVICE — GOWN XL

## (undated) DEVICE — WARMING BLANKET UPPER ADULT

## (undated) DEVICE — VENODYNE/SCD SLEEVE CALF MEDIUM

## (undated) DEVICE — SYR CATH TIP 2 OZ

## (undated) DEVICE — SOL IRR POUR H2O 500ML

## (undated) DEVICE — ELCTR GROUNDING PAD ADULT COVIDIEN

## (undated) DEVICE — POSITIONER FOAM EGG CRATE ULNAR 2PCS (PINK)

## (undated) DEVICE — SOL IRR POUR NS 0.9% 1500ML

## (undated) DEVICE — BOSTON SCIENTIFIC UROLOK II SCOPE ADAPTOR

## (undated) DEVICE — SOL IRR BAG H2O 3000ML

## (undated) DEVICE — VISITEC 4X4

## (undated) DEVICE — GLV 7 PROTEXIS (WHITE)

## (undated) DEVICE — POSITIONER HEAD REST PRONE

## (undated) DEVICE — FOLEY TRAY 16FR 5CC LTX UMETER CLOSED

## (undated) DEVICE — SOL IRR POUR NS 0.9% 500ML

## (undated) DEVICE — LAP PAD W RING 18 X 18"

## (undated) DEVICE — DRAPE DRAINAGE BAG URO CATCH II

## (undated) DEVICE — GLV 6.5 PROTEXIS (WHITE)

## (undated) DEVICE — SYR LUER LOK 20CC

## (undated) DEVICE — FOLEY HOLDER STATLOCK 2 WAY ADULT

## (undated) DEVICE — IRR BULB PATHFINDER + 10"

## (undated) DEVICE — GLV 8 PROTEXIS (WHITE)

## (undated) DEVICE — PREP BETADINE SPONGE STICKS

## (undated) DEVICE — VENODYNE/SCD SLEEVE CALF LARGE

## (undated) DEVICE — ACMI SELF-SEALING SEAL UP TO 7FR